# Patient Record
Sex: FEMALE | Race: WHITE | NOT HISPANIC OR LATINO | Employment: UNEMPLOYED | URBAN - METROPOLITAN AREA
[De-identification: names, ages, dates, MRNs, and addresses within clinical notes are randomized per-mention and may not be internally consistent; named-entity substitution may affect disease eponyms.]

---

## 2018-03-28 ENCOUNTER — OFFICE VISIT (OUTPATIENT)
Dept: FAMILY MEDICINE CLINIC | Facility: CLINIC | Age: 28
End: 2018-03-28
Payer: COMMERCIAL

## 2018-03-28 VITALS
WEIGHT: 234 LBS | DIASTOLIC BLOOD PRESSURE: 80 MMHG | OXYGEN SATURATION: 97 % | RESPIRATION RATE: 18 BRPM | HEART RATE: 102 BPM | SYSTOLIC BLOOD PRESSURE: 120 MMHG | BODY MASS INDEX: 38.99 KG/M2 | HEIGHT: 65 IN

## 2018-03-28 DIAGNOSIS — E03.9 ACQUIRED HYPOTHYROIDISM: ICD-10-CM

## 2018-03-28 DIAGNOSIS — Z76.89 ENCOUNTER TO ESTABLISH CARE: Primary | ICD-10-CM

## 2018-03-28 DIAGNOSIS — R55 SYNCOPE, UNSPECIFIED SYNCOPE TYPE: ICD-10-CM

## 2018-03-28 DIAGNOSIS — Z00.00 ANNUAL PHYSICAL EXAM: ICD-10-CM

## 2018-03-28 DIAGNOSIS — R00.2 HEART PALPITATIONS: ICD-10-CM

## 2018-03-28 DIAGNOSIS — E66.9 CLASS 2 OBESITY WITH BODY MASS INDEX (BMI) OF 38.0 TO 38.9 IN ADULT, UNSPECIFIED OBESITY TYPE, UNSPECIFIED WHETHER SERIOUS COMORBIDITY PRESENT: ICD-10-CM

## 2018-03-28 PROBLEM — F32.A DEPRESSION: Status: ACTIVE | Noted: 2018-03-28

## 2018-03-28 PROCEDURE — 99385 PREV VISIT NEW AGE 18-39: CPT | Performed by: FAMILY MEDICINE

## 2018-03-28 PROCEDURE — 93000 ELECTROCARDIOGRAM COMPLETE: CPT | Performed by: FAMILY MEDICINE

## 2018-03-28 PROCEDURE — 99203 OFFICE O/P NEW LOW 30 MIN: CPT | Performed by: FAMILY MEDICINE

## 2018-03-28 RX ORDER — LEVOTHYROXINE SODIUM 0.1 MG/1
TABLET ORAL
COMMUNITY
Start: 2018-03-11 | End: 2018-04-27 | Stop reason: SDUPTHER

## 2018-03-28 RX ORDER — NAPROXEN SODIUM 550 MG/1
550 TABLET ORAL
COMMUNITY
Start: 2014-05-14 | End: 2018-08-01 | Stop reason: ALTCHOICE

## 2018-03-28 NOTE — PROGRESS NOTES
Assessment/Plan:    No problem-specific Assessment & Plan notes found for this encounter  Diagnoses and all orders for this visit:    Encounter to establish care  Annual physical exam  - discussed diet and exercise  - no Flu vaccine this season  - unsure of last Tdap and whether or not she got the Gardasil series  - not sexually active   - advised to get immunization/medical records from former PCP/pediatrician - pt aware   - screening labs: CBC and differential  - RTO in 1month for f/u     Acquired hypothyroidism  -     TSH, 3rd generation with T4 reflex  - cont Levothyroxine 100mcg PO QD for now and will check TSH to discern if uptitration is needed     Class 2 obesity with body mass index (BMI) of 38 0 to 38 9 in adult, unspecified obesity type, unspecified whether serious comorbidity present  -     Lipid panel  -     Comprehensive metabolic panel    Heart palpitations  -     Vitamin D 25 hydroxy  -     POCT ECG - NSR at 70bpm, nml intervals     Syncope, unspecified syncope type  -     Ambulatory referral to Cardiology        Subjective:    Patient ID: Rachele Kate is a 34 y o  female  Rachele Kate is a 34 y o  female who presents to the office to establish care and for an annual exam  1) General   - prior PCP: Dr Jace Ray (sp?); last OV was 9/2017; insurance changed so establishing care here  - PMHx: Hypothyroidism, PCOS, IBS (with D/C), Depression (in remission), Panic Attacks (in remission), Eczema    - allergies: almonds (swelling)   - Meds: Levothyroxine 100mcg Qd, OTC Naproxen PRN for menses  - PSHx: [place] teeth extraction   - Fhx: M (adopted - hypothyroidism, allergies), F (DM), S (hip dysplasia, seizures), 1/2 B (depression, substance abuse); denies Fhx of sudden cardiac death   - Immunizations: last Tdap was ? 10yrs ago, no Flu vaccine this season, no Gardasil series   - GYN Hx: has never had a PAP smear; has never been sexually active   - diet/exercise: has not exercised; tries to limit gluten and diary   - social: rare EtOH, denies tob/illicits; currently unemployed   - sexual Hx: not active   - last vision: wears glasses, last OV >5yrs ago; no change in vision   - last dental: does not recall   - ROS: low energy, muscle weakness; palpitations/flutters - did pass out 2 week ago   - ROS: today in the office pt denies F/C/N/V/HA/visual changes/SOB/wheezing/abd pain/urinary incontinence/suprapubic pain/CVA tenderness/vaginal bleeding or discharge/numbness or tingling in b/l UE+LE/LE edema or calf tenderness  2) Palpitations  - palpitations/fluttering on/off for the past few months - attributes it to her hypothyroidism   - of note, does mention that she passed out 2wks ago when helping her sister back into bed (s/p hip surgery) in the middle of the night   - (+) tunnel vision, felt lightheaded and passed out   - not a/w F/C/N/V/HA/SOB  3) Hypothyroidism   - currently on Levothyroxine 100mcg QD   - unsure of when she last had labs/TSH drawn       The following portions of the patient's history were reviewed and updated as appropriate: allergies, current medications, past family history, past medical history, past social history, past surgical history and problem list     Review of Systems  per HPI   Objective:  /80   Pulse 102   Resp 18   Ht 5' 5" (1 651 m)   Wt 106 kg (234 lb)   SpO2 97%   BMI 38 94 kg/m²    Physical Exam   Constitutional: She is oriented to person, place, and time  She appears well-developed and well-nourished  No distress  HENT:   Head: Normocephalic and atraumatic  Right Ear: External ear normal    Left Ear: External ear normal    Nose: Nose normal    Mouth/Throat: Oropharynx is clear and moist  No oropharyngeal exudate  Eyes: Conjunctivae and EOM are normal  Pupils are equal, round, and reactive to light  Right eye exhibits no discharge  Left eye exhibits no discharge  No scleral icterus  Wears glasses   Neck: Normal range of motion  Neck supple   No JVD present  No tracheal deviation present  No thyromegaly present  Cardiovascular: Normal rate, regular rhythm and normal heart sounds  Exam reveals no gallop and no friction rub  No murmur heard  Pulmonary/Chest: Effort normal and breath sounds normal  No stridor  No respiratory distress  She has no wheezes  She has no rales  She exhibits no tenderness  Abdominal: Soft  Bowel sounds are normal  She exhibits no distension and no mass  There is no tenderness  There is no rebound and no guarding  Obese abdomen    Musculoskeletal: Normal range of motion  She exhibits no edema, tenderness or deformity  Lymphadenopathy:     She has no cervical adenopathy  Neurological: She is alert and oriented to person, place, and time  Skin: Skin is warm and dry  No rash noted  She is not diaphoretic  No erythema  No pallor  hirsuitism   Psychiatric: She has a normal mood and affect  Her behavior is normal  Judgment and thought content normal    Vitals reviewed

## 2018-04-13 ENCOUNTER — APPOINTMENT (OUTPATIENT)
Dept: LAB | Facility: CLINIC | Age: 28
End: 2018-04-13
Payer: COMMERCIAL

## 2018-04-13 LAB
25(OH)D3 SERPL-MCNC: 17.2 NG/ML (ref 30–100)
ALBUMIN SERPL BCP-MCNC: 4 G/DL (ref 3.5–5)
ALP SERPL-CCNC: 79 U/L (ref 46–116)
ALT SERPL W P-5'-P-CCNC: 23 U/L (ref 12–78)
ANION GAP SERPL CALCULATED.3IONS-SCNC: 4 MMOL/L (ref 4–13)
AST SERPL W P-5'-P-CCNC: 15 U/L (ref 5–45)
BASOPHILS # BLD AUTO: 0.08 THOUSANDS/ΜL (ref 0–0.1)
BASOPHILS NFR BLD AUTO: 2 % (ref 0–1)
BILIRUB SERPL-MCNC: 0.56 MG/DL (ref 0.2–1)
BUN SERPL-MCNC: 13 MG/DL (ref 5–25)
CALCIUM SERPL-MCNC: 8.8 MG/DL
CHLORIDE SERPL-SCNC: 106 MMOL/L (ref 100–108)
CHOLEST SERPL-MCNC: 183 MG/DL (ref 50–200)
CO2 SERPL-SCNC: 28 MMOL/L (ref 21–32)
CREAT SERPL-MCNC: 0.69 MG/DL (ref 0.6–1.3)
EOSINOPHIL # BLD AUTO: 0.11 THOUSAND/ΜL (ref 0–0.61)
EOSINOPHIL NFR BLD AUTO: 2 % (ref 0–6)
ERYTHROCYTE [DISTWIDTH] IN BLOOD BY AUTOMATED COUNT: 13 % (ref 11.6–15.1)
GFR SERPL CREATININE-BSD FRML MDRD: 121 ML/MIN/1.73SQ M
GLUCOSE P FAST SERPL-MCNC: 82 MG/DL (ref 65–99)
HCT VFR BLD AUTO: 40.8 % (ref 34.8–46.1)
HDLC SERPL-MCNC: 60 MG/DL (ref 40–60)
HGB BLD-MCNC: 13.1 G/DL (ref 11.5–15.4)
LDLC SERPL CALC-MCNC: 99 MG/DL (ref 0–100)
LYMPHOCYTES # BLD AUTO: 1.9 THOUSANDS/ΜL (ref 0.6–4.47)
LYMPHOCYTES NFR BLD AUTO: 40 % (ref 14–44)
MCH RBC QN AUTO: 30.9 PG (ref 26.8–34.3)
MCHC RBC AUTO-ENTMCNC: 32.1 G/DL (ref 31.4–37.4)
MCV RBC AUTO: 96 FL (ref 82–98)
MONOCYTES # BLD AUTO: 0.35 THOUSAND/ΜL (ref 0.17–1.22)
MONOCYTES NFR BLD AUTO: 7 % (ref 4–12)
NEUTROPHILS # BLD AUTO: 2.35 THOUSANDS/ΜL (ref 1.85–7.62)
NEUTS SEG NFR BLD AUTO: 49 % (ref 43–75)
NONHDLC SERPL-MCNC: 123 MG/DL
NRBC BLD AUTO-RTO: 0 /100 WBCS
PLATELET # BLD AUTO: 271 THOUSANDS/UL (ref 149–390)
PMV BLD AUTO: 12.2 FL (ref 8.9–12.7)
POTASSIUM SERPL-SCNC: 3.8 MMOL/L (ref 3.5–5.3)
PROT SERPL-MCNC: 7.9 G/DL (ref 6.4–8.2)
RBC # BLD AUTO: 4.24 MILLION/UL (ref 3.81–5.12)
SODIUM SERPL-SCNC: 138 MMOL/L (ref 136–145)
T4 FREE SERPL-MCNC: 0.96 NG/DL (ref 0.76–1.46)
TRIGL SERPL-MCNC: 119 MG/DL
TSH SERPL DL<=0.05 MIU/L-ACNC: 4.1 UIU/ML (ref 0.36–3.74)
WBC # BLD AUTO: 4.8 THOUSAND/UL (ref 4.31–10.16)

## 2018-04-13 PROCEDURE — 84439 ASSAY OF FREE THYROXINE: CPT | Performed by: FAMILY MEDICINE

## 2018-04-13 PROCEDURE — 80053 COMPREHEN METABOLIC PANEL: CPT | Performed by: FAMILY MEDICINE

## 2018-04-13 PROCEDURE — 36415 COLL VENOUS BLD VENIPUNCTURE: CPT | Performed by: FAMILY MEDICINE

## 2018-04-13 PROCEDURE — 85025 COMPLETE CBC W/AUTO DIFF WBC: CPT | Performed by: FAMILY MEDICINE

## 2018-04-13 PROCEDURE — 84443 ASSAY THYROID STIM HORMONE: CPT | Performed by: FAMILY MEDICINE

## 2018-04-13 PROCEDURE — 80061 LIPID PANEL: CPT | Performed by: FAMILY MEDICINE

## 2018-04-13 PROCEDURE — 82306 VITAMIN D 25 HYDROXY: CPT | Performed by: FAMILY MEDICINE

## 2018-04-27 ENCOUNTER — OFFICE VISIT (OUTPATIENT)
Dept: FAMILY MEDICINE CLINIC | Facility: CLINIC | Age: 28
End: 2018-04-27
Payer: COMMERCIAL

## 2018-04-27 VITALS
DIASTOLIC BLOOD PRESSURE: 88 MMHG | HEART RATE: 72 BPM | WEIGHT: 238 LBS | HEIGHT: 65 IN | RESPIRATION RATE: 16 BRPM | SYSTOLIC BLOOD PRESSURE: 132 MMHG | OXYGEN SATURATION: 99 % | BODY MASS INDEX: 39.65 KG/M2

## 2018-04-27 DIAGNOSIS — E55.9 VITAMIN D DEFICIENCY: ICD-10-CM

## 2018-04-27 DIAGNOSIS — E03.9 ACQUIRED HYPOTHYROIDISM: ICD-10-CM

## 2018-04-27 DIAGNOSIS — F41.1 ANXIETY STATE: Primary | ICD-10-CM

## 2018-04-27 PROCEDURE — 99213 OFFICE O/P EST LOW 20 MIN: CPT | Performed by: FAMILY MEDICINE

## 2018-04-27 RX ORDER — LEVOTHYROXINE SODIUM 112 UG/1
112 TABLET ORAL DAILY
Qty: 30 TABLET | Refills: 1 | Status: SHIPPED | OUTPATIENT
Start: 2018-04-27 | End: 2018-06-11 | Stop reason: SDUPTHER

## 2018-04-27 NOTE — PROGRESS NOTES
Assessment/Plan:    No problem-specific Assessment & Plan notes found for this encounter  Diagnoses and all orders for this visit:  Anxiety state  - discussed starting on SSRI = pt to think about it - very hesitant to start given h/o significant weight gain on antidepressants in the past  - denies SI/HI  - has a lot of anxiety   - referred to in-office interim therapist to help figure out root of anxiety and to help with coping skills/stress management   - t/c referral back to Dr Supa Reyes - pt's former [address] [place] [address],[address] in 4wks for further eval     Acquired hypothyroidism  -  Increased levothyroxine from 100 to 112mcg QD  -  levothyroxine 112 mcg tablet; Take 1 tablet (112 mcg total) by mouth daily  -  TSH, 3rd generation with T4 reflex in 4wks   - RTO in 4wks to discern if medication titration is warranted - pt agreeable     Vitamin D deficiency  -     cholecalciferol 2000 units TABS; Take 1 tablets (1,000 Units total) by mouth daily        Subjective:    Patient ID: Jes Bhagat is a 34 y o  female    33yo F presents to the office for f/u labs, Hypothyroidism and Palpitations  1) Labs  - CBC and CMP within normal limits  - Lipid Panel: , , HDL 60, LDL 99  - Vit D 17 2  2) Hypothyroidism   - currently on Levothyroxine 100mcg QD  - TSH 4 1, T4 0 96  - intermittent palpitations  - office EKG at last OV was within normal limits   - has not gone to see Cardio yet   - denies F/C/N/V/CP/SOB/abd pain  3) h/o Anxiety and Depression   - Brother and Grandparent  within the past 2yrs   - used to follow with Dr Supa Reyes and has tried the following in the past: Lexapro, Ativan, Xanax, Vybrid   - self weaned off bc was gaining weight  - has never seen a therapist   - (+) very anxious   - denies SI/HI      The following portions of the patient's history were reviewed and updated as appropriate: allergies, current medications, past family history, past medical history, past social history, past surgical history and problem list     Review of Systems  as per HPI   Objective:  /88   Pulse 72   Resp 16   Ht 5' 5" (1 651 m)   Wt 108 kg (238 lb)   SpO2 99%   BMI 39 61 kg/m²    Physical Exam   Constitutional: She is oriented to person, place, and time  She appears well-developed and well-nourished  No distress  HENT:   Head: Normocephalic and atraumatic  Nose: Nose normal    Eyes: Conjunctivae and EOM are normal  Right eye exhibits no discharge  Left eye exhibits no discharge  No scleral icterus  Wears glasses   Neck: Normal range of motion  No tracheal deviation present  Cardiovascular: Normal rate, regular rhythm and normal heart sounds  Exam reveals no gallop and no friction rub  No murmur heard  Pulmonary/Chest: Effort normal and breath sounds normal  No stridor  Abdominal: Soft  Bowel sounds are normal    Musculoskeletal: Normal range of motion  She exhibits no edema or deformity  Neurological: She is alert and oriented to person, place, and time  Skin: Skin is warm and dry  No rash noted  She is not diaphoretic  No erythema  No pallor  Psychiatric: Her speech is normal and behavior is normal  Judgment and thought content normal  Her mood appears anxious  Cognition and memory are normal  She expresses no homicidal and no suicidal ideation  She expresses no suicidal plans and no homicidal plans  Vitals reviewed

## 2018-06-05 ENCOUNTER — APPOINTMENT (OUTPATIENT)
Dept: LAB | Facility: CLINIC | Age: 28
End: 2018-06-05
Payer: COMMERCIAL

## 2018-06-05 DIAGNOSIS — E03.9 ACQUIRED HYPOTHYROIDISM: ICD-10-CM

## 2018-06-05 LAB — TSH SERPL DL<=0.05 MIU/L-ACNC: 0.84 UIU/ML (ref 0.36–3.74)

## 2018-06-05 PROCEDURE — 84443 ASSAY THYROID STIM HORMONE: CPT

## 2018-06-05 PROCEDURE — 36415 COLL VENOUS BLD VENIPUNCTURE: CPT

## 2018-06-11 DIAGNOSIS — E03.9 ACQUIRED HYPOTHYROIDISM: ICD-10-CM

## 2018-06-12 RX ORDER — LEVOTHYROXINE SODIUM 112 UG/1
112 TABLET ORAL DAILY
Qty: 30 TABLET | Refills: 0 | Status: SHIPPED | OUTPATIENT
Start: 2018-06-12 | End: 2018-07-24 | Stop reason: SDUPTHER

## 2018-06-13 ENCOUNTER — OFFICE VISIT (OUTPATIENT)
Dept: FAMILY MEDICINE CLINIC | Facility: CLINIC | Age: 28
End: 2018-06-13
Payer: COMMERCIAL

## 2018-06-13 VITALS
HEART RATE: 71 BPM | SYSTOLIC BLOOD PRESSURE: 108 MMHG | WEIGHT: 237 LBS | OXYGEN SATURATION: 99 % | RESPIRATION RATE: 16 BRPM | BODY MASS INDEX: 39.49 KG/M2 | HEIGHT: 65 IN | DIASTOLIC BLOOD PRESSURE: 68 MMHG

## 2018-06-13 DIAGNOSIS — D22.9 ATYPICAL MOLE: ICD-10-CM

## 2018-06-13 DIAGNOSIS — F41.9 ANXIETY: ICD-10-CM

## 2018-06-13 DIAGNOSIS — E03.9 ACQUIRED HYPOTHYROIDISM: ICD-10-CM

## 2018-06-13 DIAGNOSIS — E55.9 VITAMIN D DEFICIENCY: Primary | ICD-10-CM

## 2018-06-13 PROCEDURE — 99214 OFFICE O/P EST MOD 30 MIN: CPT | Performed by: FAMILY MEDICINE

## 2018-06-13 RX ORDER — CHOLECALCIFEROL (VITAMIN D3) 1250 MCG
1 CAPSULE ORAL WEEKLY
Qty: 12 CAPSULE | Refills: 0 | Status: SHIPPED | OUTPATIENT
Start: 2018-06-13 | End: 2018-08-24 | Stop reason: SDUPTHER
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
## 2018-06-19 DIAGNOSIS — F41.9 ANXIETY: Primary | ICD-10-CM

## 2018-07-02 ENCOUNTER — OFFICE VISIT (OUTPATIENT)
Dept: BEHAVIORAL/MENTAL HEALTH CLINIC | Facility: CLINIC | Age: 28
End: 2018-07-02
Payer: COMMERCIAL

## 2018-07-02 DIAGNOSIS — F41.9 ANXIETY: ICD-10-CM

## 2018-07-02 PROCEDURE — 90834 PSYTX W PT 45 MINUTES: CPT | Performed by: SOCIAL WORKER

## 2018-07-02 NOTE — PATIENT INSTRUCTIONS
Follow up in 2 weeks with this worker for support therapy  Pt will be contacting her insurance and start calling local agencies for long-term counseling

## 2018-07-02 NOTE — PSYCH
Assessment/Plan:      Diagnoses and all orders for this visit:    Anxiety  -     Ambulatory referral to Behavioral Health          Subjective: manage anxeity, depression, and grief symptoms     Patient ID: Felisha Morin is a 34 y o  female  Pt has been struggling with increased anxiety/depression and grief  Discussed pt's losses (brother and grandfather) in the past few years and the difficulty she has had with the grief process  Discussed family dynamics and pt's tendency to be a caregiver for other instead of caring for herself  Pt will continue to follow up until she is linked with ongoing OP services  Pt has support within her family  Pt was alert, oriented, pleasant, and denies SI or HI  Review of Systems   Psychiatric/Behavioral: Positive for sleep disturbance  The patient is nervous/anxious  Objective:     Physical Exam   Psychiatric: Her speech is normal and behavior is normal  Judgment and thought content normal  Her mood appears anxious  Cognition and memory are normal  She exhibits a depressed mood

## 2018-07-16 ENCOUNTER — OFFICE VISIT (OUTPATIENT)
Dept: BEHAVIORAL/MENTAL HEALTH CLINIC | Facility: CLINIC | Age: 28
End: 2018-07-16
Payer: COMMERCIAL

## 2018-07-16 DIAGNOSIS — F41.9 ANXIETY: Primary | ICD-10-CM

## 2018-07-16 PROCEDURE — 90834 PSYTX W PT 45 MINUTES: CPT | Performed by: SOCIAL WORKER

## 2018-07-16 NOTE — PSYCH
Assessment/Plan:      Diagnoses and all orders for this visit:    Anxiety          Subjective: Pt presents for a follow up session regarding her grief and anxiety  Patient ID: Tiffany Caban is a 34 y o  female  Discussed recent struggles dealing with her grandfather's house and coping with the one year anniversary of her brother's passing  Continued to discuss the importance of routines and structure, as well as the importance of self care  Discussed pt looking into some volunteer opportunities instead of rushing back into work  Pt will follow up in 2 weeks for continued support  Review of Systems   Psychiatric/Behavioral: Positive for dysphoric mood  The patient is nervous/anxious  Objective: Pt will continue to work on her self care as she tends to be more of a caregiver  Pt will look into ongoing OP therapy options and volunteer opportunities between now and the next session  Physical Exam   Psychiatric: She has a normal mood and affect   Her speech is normal and behavior is normal  Judgment and thought content normal  Cognition and memory are normal

## 2018-07-24 DIAGNOSIS — E03.9 ACQUIRED HYPOTHYROIDISM: ICD-10-CM

## 2018-07-25 RX ORDER — LEVOTHYROXINE SODIUM 112 UG/1
TABLET ORAL
Qty: 30 TABLET | Refills: 0 | Status: SHIPPED | OUTPATIENT
Start: 2018-07-25 | End: 2018-08-01 | Stop reason: SDUPTHER

## 2018-07-27 ENCOUNTER — APPOINTMENT (OUTPATIENT)
Dept: LAB | Facility: CLINIC | Age: 28
End: 2018-07-27
Payer: COMMERCIAL

## 2018-07-27 DIAGNOSIS — E03.9 ACQUIRED HYPOTHYROIDISM: ICD-10-CM

## 2018-07-27 LAB — TSH SERPL DL<=0.05 MIU/L-ACNC: 0.76 UIU/ML (ref 0.36–3.74)

## 2018-07-27 PROCEDURE — 36415 COLL VENOUS BLD VENIPUNCTURE: CPT

## 2018-07-27 PROCEDURE — 84443 ASSAY THYROID STIM HORMONE: CPT

## 2018-08-01 ENCOUNTER — OFFICE VISIT (OUTPATIENT)
Dept: FAMILY MEDICINE CLINIC | Facility: CLINIC | Age: 28
End: 2018-08-01
Payer: COMMERCIAL

## 2018-08-01 VITALS
HEIGHT: 65 IN | HEART RATE: 82 BPM | WEIGHT: 233 LBS | DIASTOLIC BLOOD PRESSURE: 70 MMHG | SYSTOLIC BLOOD PRESSURE: 128 MMHG | RESPIRATION RATE: 16 BRPM | BODY MASS INDEX: 38.82 KG/M2 | OXYGEN SATURATION: 98 %

## 2018-08-01 DIAGNOSIS — E66.9 CLASS 2 OBESITY WITH BODY MASS INDEX (BMI) OF 38.0 TO 38.9 IN ADULT, UNSPECIFIED OBESITY TYPE, UNSPECIFIED WHETHER SERIOUS COMORBIDITY PRESENT: ICD-10-CM

## 2018-08-01 DIAGNOSIS — K58.2 IRRITABLE BOWEL SYNDROME WITH BOTH CONSTIPATION AND DIARRHEA: ICD-10-CM

## 2018-08-01 DIAGNOSIS — E03.9 ACQUIRED HYPOTHYROIDISM: Primary | ICD-10-CM

## 2018-08-01 DIAGNOSIS — F41.9 ANXIETY: ICD-10-CM

## 2018-08-01 DIAGNOSIS — E55.9 VITAMIN D DEFICIENCY: ICD-10-CM

## 2018-08-01 DIAGNOSIS — D22.9 ATYPICAL MOLE: ICD-10-CM

## 2018-08-01 PROCEDURE — 99213 OFFICE O/P EST LOW 20 MIN: CPT | Performed by: FAMILY MEDICINE

## 2018-08-01 PROCEDURE — 3008F BODY MASS INDEX DOCD: CPT | Performed by: FAMILY MEDICINE

## 2018-08-01 RX ORDER — LEVOTHYROXINE SODIUM 112 UG/1
112 TABLET ORAL DAILY
Qty: 90 TABLET | Refills: 0 | Status: SHIPPED | OUTPATIENT
Start: 2018-08-01 | End: 2018-11-14 | Stop reason: DRUGHIGH

## 2018-08-01 NOTE — PROGRESS NOTES
Assessment/Plan:  No problem-specific Assessment & Plan notes found for this encounter  Diagnoses and all orders for this visit:  Acquired hypothyroidism  - TSH within normal limits  - cont Levothyroxine 112mcg QAM  -     TSH, 3rd generation with T4 reflex; Future  -     levothyroxine 112 mcg tablet; Take 1 tablet (112 mcg total) by mouth daily  - RTO in 3months for recheck     Irritable bowel syndrome with both constipation and diarrhea  -     Ambulatory referral to Gastroenterology; Future  - discussed lifestyle modifications and stress reduction     Anxiety  - has not made a f/u appt with her psychiatrist - Dr [place] - strongly encouraged to do so   - advised to also follow with a therapist - was seeing Vanessa Ascencio in the office, but she is no longer with [facility] had given her a list of local therapists to follow with     Vitamin D deficiency  - was given 5000IU QD by the pharmacy when prescribed [id number] Qwk    Atypical mole  - had it removed by her parents PCP = per pt path was nml - advised to get records     Class 2 obesity with body mass index (BMI) of 38 0 to 38 9 in adult, unspecified obesity type, unspecified whether serious comorbidity present  - has lost 4lbs since last OV ~6wks ago  - encouraged to cont exercising - has been walking more          Subjective:    Patient ID: Alvaro Kumar is a 34 y o  female    35yo F presents to the office for f/u   1) Atypical Nevi  - had it removed by her parent's PCP = path came back nml per pt (awaiting records)   2) TSH  - takes 112mcg QAM and tolerating it well   - TSH within nml limits at 0 763   - today in the office pt denies F/C/V/CP/palpitations/SOB/wheezing/abd pain/LE edema   - (+) intermittent nausea  3) Vit D  - 5000IU was filled at Pharmacy vs [id number] Qwk  - tends to get sweaty while taking it - takes it around lunch time with food    4) Anxiety   - in the process of selling grandfather's house - has been stressful this past month  - followed with Anyi Rodrigues in the office (and found it useful) - was referred to other therapists as Anyi Ravi no longer here  - has not made an appt with Dr Loman Bernheim office   - has lost 4lbs since her last OV 6wks ago - has been trying to exercise  - (+) PMHx of IBS and feels like its been acting up given increased stress at home over the past few weeks        The following portions of the patient's history were reviewed and updated as appropriate: allergies, current medications, past family history, past medical history, past social history, past surgical history and problem list     Review of Systems  as per HPI     Objective:  /70   Pulse 82   Resp 16   Ht 5' 5" (1 651 m)   Wt 106 kg (233 lb)   SpO2 98%   BMI 38 77 kg/m²    Physical Exam   Constitutional: She is oriented to person, place, and time  She appears well-developed and well-nourished  No distress  HENT:   Head: Normocephalic and atraumatic  Nose: Nose normal    Eyes: Conjunctivae and EOM are normal  Right eye exhibits no discharge  Left eye exhibits no discharge  No scleral icterus  Wears glasses   Neck: Normal range of motion  Neck supple  No JVD present  No thyromegaly present  Cardiovascular: Normal rate, regular rhythm and normal heart sounds  Exam reveals no gallop and no friction rub  No murmur heard  Pulmonary/Chest: Effort normal and breath sounds normal  No stridor  No respiratory distress  She has no wheezes  She has no rales  Abdominal: Soft  Bowel sounds are normal    Musculoskeletal: Normal range of motion  She exhibits no edema, tenderness or deformity  Neurological: She is alert and oriented to person, place, and time  Skin: Skin is warm and dry  She is not diaphoretic  Well healing site of atypical nevi removal    Psychiatric: She has a normal mood and affect  Her behavior is normal  Judgment and thought content normal    Vitals reviewed

## 2018-08-24 DIAGNOSIS — E55.9 VITAMIN D DEFICIENCY: ICD-10-CM

## 2018-08-24 RX ORDER — CHOLECALCIFEROL (VITAMIN D3) 1250 MCG
1 CAPSULE ORAL WEEKLY
Qty: 12 CAPSULE | Refills: 0 | Status: SHIPPED | OUTPATIENT
Start: 2018-08-24 | End: 2019-04-24 | Stop reason: SDUPTHER

## 2018-11-09 ENCOUNTER — LAB (OUTPATIENT)
Dept: LAB | Facility: CLINIC | Age: 28
End: 2018-11-09
Payer: COMMERCIAL

## 2018-11-09 DIAGNOSIS — E03.9 ACQUIRED HYPOTHYROIDISM: ICD-10-CM

## 2018-11-09 LAB
T4 FREE SERPL-MCNC: 1.03 NG/DL (ref 0.76–1.46)
TSH SERPL DL<=0.05 MIU/L-ACNC: 0.32 UIU/ML (ref 0.36–3.74)

## 2018-11-09 PROCEDURE — 36415 COLL VENOUS BLD VENIPUNCTURE: CPT

## 2018-11-09 PROCEDURE — 84443 ASSAY THYROID STIM HORMONE: CPT

## 2018-11-09 PROCEDURE — 84439 ASSAY OF FREE THYROXINE: CPT

## 2018-11-14 ENCOUNTER — OFFICE VISIT (OUTPATIENT)
Dept: FAMILY MEDICINE CLINIC | Facility: CLINIC | Age: 28
End: 2018-11-14
Payer: COMMERCIAL

## 2018-11-14 VITALS
OXYGEN SATURATION: 97 % | SYSTOLIC BLOOD PRESSURE: 118 MMHG | DIASTOLIC BLOOD PRESSURE: 76 MMHG | RESPIRATION RATE: 16 BRPM | HEIGHT: 65 IN | HEART RATE: 100 BPM | WEIGHT: 244.6 LBS | BODY MASS INDEX: 40.75 KG/M2

## 2018-11-14 DIAGNOSIS — Z23 NEED FOR TDAP VACCINATION: ICD-10-CM

## 2018-11-14 DIAGNOSIS — E03.9 ACQUIRED HYPOTHYROIDISM: Primary | ICD-10-CM

## 2018-11-14 DIAGNOSIS — F41.9 ANXIETY: ICD-10-CM

## 2018-11-14 DIAGNOSIS — E55.9 VITAMIN D DEFICIENCY: ICD-10-CM

## 2018-11-14 DIAGNOSIS — Z23 NEED FOR INFLUENZA VACCINATION: ICD-10-CM

## 2018-11-14 PROCEDURE — 90715 TDAP VACCINE 7 YRS/> IM: CPT

## 2018-11-14 PROCEDURE — 90471 IMMUNIZATION ADMIN: CPT

## 2018-11-14 PROCEDURE — 3008F BODY MASS INDEX DOCD: CPT | Performed by: FAMILY MEDICINE

## 2018-11-14 PROCEDURE — 1036F TOBACCO NON-USER: CPT | Performed by: FAMILY MEDICINE

## 2018-11-14 PROCEDURE — 90686 IIV4 VACC NO PRSV 0.5 ML IM: CPT

## 2018-11-14 PROCEDURE — 99214 OFFICE O/P EST MOD 30 MIN: CPT | Performed by: FAMILY MEDICINE

## 2018-11-14 PROCEDURE — 90472 IMMUNIZATION ADMIN EACH ADD: CPT

## 2018-11-14 RX ORDER — LEVOTHYROXINE SODIUM 0.1 MG/1
100 TABLET ORAL DAILY
Qty: 90 TABLET | Refills: 0 | Status: SHIPPED | OUTPATIENT
Start: 2018-11-14 | End: 2019-02-13 | Stop reason: SDUPTHER

## 2019-02-12 LAB
25(OH)D3 SERPL-MCNC: 33 NG/ML (ref 30–100)
TSH SERPL-ACNC: 2.58 MIU/L

## 2019-02-13 ENCOUNTER — TELEPHONE (OUTPATIENT)
Dept: FAMILY MEDICINE CLINIC | Facility: CLINIC | Age: 29
End: 2019-02-13

## 2019-02-13 ENCOUNTER — OFFICE VISIT (OUTPATIENT)
Dept: FAMILY MEDICINE CLINIC | Facility: CLINIC | Age: 29
End: 2019-02-13
Payer: COMMERCIAL

## 2019-02-13 VITALS
SYSTOLIC BLOOD PRESSURE: 122 MMHG | DIASTOLIC BLOOD PRESSURE: 62 MMHG | BODY MASS INDEX: 39.82 KG/M2 | HEIGHT: 65 IN | OXYGEN SATURATION: 98 % | HEART RATE: 94 BPM | WEIGHT: 239 LBS | RESPIRATION RATE: 16 BRPM

## 2019-02-13 DIAGNOSIS — F41.9 ANXIETY: ICD-10-CM

## 2019-02-13 DIAGNOSIS — E03.9 ACQUIRED HYPOTHYROIDISM: ICD-10-CM

## 2019-02-13 DIAGNOSIS — K58.2 IRRITABLE BOWEL SYNDROME WITH BOTH CONSTIPATION AND DIARRHEA: ICD-10-CM

## 2019-02-13 DIAGNOSIS — E55.9 VITAMIN D DEFICIENCY: ICD-10-CM

## 2019-02-13 DIAGNOSIS — E03.9 ACQUIRED HYPOTHYROIDISM: Primary | ICD-10-CM

## 2019-02-13 DIAGNOSIS — E66.9 CLASS 2 OBESITY WITH BODY MASS INDEX (BMI) OF 39.0 TO 39.9 IN ADULT, UNSPECIFIED OBESITY TYPE, UNSPECIFIED WHETHER SERIOUS COMORBIDITY PRESENT: ICD-10-CM

## 2019-02-13 PROCEDURE — 3008F BODY MASS INDEX DOCD: CPT | Performed by: FAMILY MEDICINE

## 2019-02-13 PROCEDURE — 3725F SCREEN DEPRESSION PERFORMED: CPT | Performed by: FAMILY MEDICINE

## 2019-02-13 PROCEDURE — 99213 OFFICE O/P EST LOW 20 MIN: CPT | Performed by: FAMILY MEDICINE

## 2019-02-13 PROCEDURE — 1036F TOBACCO NON-USER: CPT | Performed by: FAMILY MEDICINE

## 2019-02-13 RX ORDER — LEVOTHYROXINE SODIUM 0.1 MG/1
100 TABLET ORAL DAILY
Qty: 90 TABLET | Refills: 0 | Status: SHIPPED | OUTPATIENT
Start: 2019-02-13 | End: 2019-04-24 | Stop reason: SDUPTHER

## 2019-02-13 NOTE — TELEPHONE ENCOUNTER
Patient called in and said she forgot to tell Dr Joann Brennan that she needs a refill on her thyroid medication  She uses [address] [place]
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
## 2019-03-01 ENCOUNTER — HOSPITAL ENCOUNTER (OUTPATIENT)
Dept: ULTRASOUND IMAGING | Facility: HOSPITAL | Age: 29
Discharge: HOME | End: 2019-03-01
Payer: COMMERCIAL

## 2019-03-01 DIAGNOSIS — E03.9 ACQUIRED HYPOTHYROIDISM: ICD-10-CM

## 2019-03-01 PROCEDURE — 76536 US EXAM OF HEAD AND NECK: CPT

## 2019-04-08 ENCOUNTER — OFFICE VISIT (OUTPATIENT)
Dept: OBGYN CLINIC | Facility: CLINIC | Age: 29
End: 2019-04-08
Payer: COMMERCIAL

## 2019-04-08 VITALS
WEIGHT: 237.4 LBS | BODY MASS INDEX: 39.55 KG/M2 | SYSTOLIC BLOOD PRESSURE: 122 MMHG | HEIGHT: 65 IN | DIASTOLIC BLOOD PRESSURE: 76 MMHG

## 2019-04-08 DIAGNOSIS — L68.0 HIRSUTISM: Primary | ICD-10-CM

## 2019-04-08 PROCEDURE — 99203 OFFICE O/P NEW LOW 30 MIN: CPT | Performed by: OBSTETRICS & GYNECOLOGY

## 2019-04-18 LAB
25(OH)D3 SERPL-MCNC: 24 NG/ML (ref 30–100)
ALBUMIN SERPL-MCNC: 4.4 G/DL (ref 3.6–5.1)
ALBUMIN/GLOB SERPL: 1.5 (CALC) (ref 1–2.5)
ALP SERPL-CCNC: 65 U/L (ref 33–115)
ALT SERPL-CCNC: 10 U/L (ref 6–29)
AST SERPL-CCNC: 14 U/L (ref 10–30)
BASOPHILS # BLD AUTO: 78 CELLS/UL (ref 0–200)
BASOPHILS NFR BLD AUTO: 1.7 %
BILIRUB SERPL-MCNC: 0.8 MG/DL (ref 0.2–1.2)
BUN SERPL-MCNC: 12 MG/DL (ref 7–25)
BUN/CREAT SERPL: NORMAL (CALC) (ref 6–22)
CALCIUM SERPL-MCNC: 9.8 MG/DL (ref 8.6–10.2)
CHLORIDE SERPL-SCNC: 103 MMOL/L (ref 98–110)
CHOLEST SERPL-MCNC: 195 MG/DL
CHOLEST/HDLC SERPL: 2.9 (CALC)
CO2 SERPL-SCNC: 28 MMOL/L (ref 20–32)
CREAT SERPL-MCNC: 0.77 MG/DL (ref 0.5–1.1)
EOSINOPHIL # BLD AUTO: 101 CELLS/UL (ref 15–500)
EOSINOPHIL NFR BLD AUTO: 2.2 %
ERYTHROCYTE [DISTWIDTH] IN BLOOD BY AUTOMATED COUNT: 12.6 % (ref 11–15)
EST. AVERAGE GLUCOSE BLD GHB EST-MCNC: 91 (CALC)
EST. AVERAGE GLUCOSE BLD GHB EST-SCNC: 5 (CALC)
GLOBULIN SER CALC-MCNC: 2.9 G/DL (CALC) (ref 1.9–3.7)
GLUCOSE SERPL-MCNC: 79 MG/DL (ref 65–99)
HBA1C MFR BLD: 4.8 % OF TOTAL HGB
HCT VFR BLD AUTO: 40.3 % (ref 35–45)
HDLC SERPL-MCNC: 68 MG/DL
HGB BLD-MCNC: 13.7 G/DL (ref 11.7–15.5)
LDLC SERPL CALC-MCNC: 111 MG/DL (CALC)
LYMPHOCYTES # BLD AUTO: 1403 CELLS/UL (ref 850–3900)
LYMPHOCYTES NFR BLD AUTO: 30.5 %
MCH RBC QN AUTO: 31.5 PG (ref 27–33)
MCHC RBC AUTO-ENTMCNC: 34 G/DL (ref 32–36)
MCV RBC AUTO: 92.6 FL (ref 80–100)
MONOCYTES # BLD AUTO: 359 CELLS/UL (ref 200–950)
MONOCYTES NFR BLD AUTO: 7.8 %
NEUTROPHILS # BLD AUTO: 2659 CELLS/UL (ref 1500–7800)
NEUTROPHILS NFR BLD AUTO: 57.8 %
NONHDLC SERPL-MCNC: 127 MG/DL (CALC)
PLATELET # BLD AUTO: 273 THOUSAND/UL (ref 140–400)
PMV BLD REES-ECKER: 12.6 FL (ref 7.5–12.5)
POTASSIUM SERPL-SCNC: 4.4 MMOL/L (ref 3.5–5.3)
PROT SERPL-MCNC: 7.3 G/DL (ref 6.1–8.1)
RBC # BLD AUTO: 4.35 MILLION/UL (ref 3.8–5.1)
SL AMB EGFR AFRICAN AMERICAN: 123 ML/MIN/1.73M2
SL AMB EGFR NON AFRICAN AMERICAN: 106 ML/MIN/1.73M2
SODIUM SERPL-SCNC: 139 MMOL/L (ref 135–146)
TRIGL SERPL-MCNC: 69 MG/DL
TSH SERPL-ACNC: 1.92 MIU/L
WBC # BLD AUTO: 4.6 THOUSAND/UL (ref 3.8–10.8)

## 2019-04-24 ENCOUNTER — OFFICE VISIT (OUTPATIENT)
Dept: FAMILY MEDICINE CLINIC | Facility: CLINIC | Age: 29
End: 2019-04-24
Payer: COMMERCIAL

## 2019-04-24 VITALS
BODY MASS INDEX: 40.29 KG/M2 | SYSTOLIC BLOOD PRESSURE: 120 MMHG | HEIGHT: 65 IN | DIASTOLIC BLOOD PRESSURE: 70 MMHG | HEART RATE: 74 BPM | OXYGEN SATURATION: 99 % | WEIGHT: 241.8 LBS | RESPIRATION RATE: 16 BRPM

## 2019-04-24 DIAGNOSIS — F41.9 ANXIETY: ICD-10-CM

## 2019-04-24 DIAGNOSIS — M54.2 NECK PAIN ON LEFT SIDE: ICD-10-CM

## 2019-04-24 DIAGNOSIS — E66.01 CLASS 3 SEVERE OBESITY WITH BODY MASS INDEX (BMI) OF 40.0 TO 44.9 IN ADULT, UNSPECIFIED OBESITY TYPE, UNSPECIFIED WHETHER SERIOUS COMORBIDITY PRESENT (HCC): ICD-10-CM

## 2019-04-24 DIAGNOSIS — Z00.00 ANNUAL PHYSICAL EXAM: Primary | ICD-10-CM

## 2019-04-24 DIAGNOSIS — E03.9 ACQUIRED HYPOTHYROIDISM: ICD-10-CM

## 2019-04-24 DIAGNOSIS — E55.9 VITAMIN D DEFICIENCY: ICD-10-CM

## 2019-04-24 DIAGNOSIS — K58.2 IRRITABLE BOWEL SYNDROME WITH BOTH CONSTIPATION AND DIARRHEA: ICD-10-CM

## 2019-04-24 PROCEDURE — 99395 PREV VISIT EST AGE 18-39: CPT | Performed by: FAMILY MEDICINE

## 2019-04-24 PROCEDURE — 99214 OFFICE O/P EST MOD 30 MIN: CPT | Performed by: FAMILY MEDICINE

## 2019-04-24 PROCEDURE — 3725F SCREEN DEPRESSION PERFORMED: CPT | Performed by: FAMILY MEDICINE

## 2019-04-24 RX ORDER — LEVOTHYROXINE SODIUM 0.1 MG/1
100 TABLET ORAL DAILY
Qty: 90 TABLET | Refills: 1 | Status: SHIPPED | OUTPATIENT
Start: 2019-04-24 | End: 2019-10-30 | Stop reason: SDUPTHER

## 2019-04-24 RX ORDER — COLLAGEN, HYDROLYSATE (BOVINE) 100 %
1 POWDER (GRAM) MISCELLANEOUS DAILY
COMMUNITY
End: 2020-05-08

## 2019-04-24 RX ORDER — CHOLECALCIFEROL (VITAMIN D3) 1250 MCG
1 CAPSULE ORAL WEEKLY
Qty: 25 CAPSULE | Refills: 0 | Status: SHIPPED | OUTPATIENT
Start: 2019-04-24 | End: 2020-01-14

## 2019-05-28 ENCOUNTER — OFFICE VISIT (OUTPATIENT)
Dept: BARIATRICS | Facility: CLINIC | Age: 29
End: 2019-05-28
Payer: COMMERCIAL

## 2019-05-28 VITALS
SYSTOLIC BLOOD PRESSURE: 130 MMHG | DIASTOLIC BLOOD PRESSURE: 80 MMHG | WEIGHT: 238.3 LBS | HEIGHT: 67 IN | HEART RATE: 66 BPM | BODY MASS INDEX: 37.4 KG/M2 | RESPIRATION RATE: 16 BRPM | TEMPERATURE: 98.4 F

## 2019-05-28 DIAGNOSIS — E03.9 ACQUIRED HYPOTHYROIDISM: ICD-10-CM

## 2019-05-28 DIAGNOSIS — E55.9 VITAMIN D DEFICIENCY: ICD-10-CM

## 2019-05-28 DIAGNOSIS — E66.9 CLASS 2 OBESITY WITH BODY MASS INDEX (BMI) OF 37.0 TO 37.9 IN ADULT, UNSPECIFIED OBESITY TYPE, UNSPECIFIED WHETHER SERIOUS COMORBIDITY PRESENT: Primary | ICD-10-CM

## 2019-05-28 DIAGNOSIS — F32.A DEPRESSION: ICD-10-CM

## 2019-05-28 DIAGNOSIS — F41.9 ANXIETY: ICD-10-CM

## 2019-05-28 PROCEDURE — 99243 OFF/OP CNSLTJ NEW/EST LOW 30: CPT | Performed by: FAMILY MEDICINE

## 2019-07-29 ENCOUNTER — OFFICE VISIT (OUTPATIENT)
Dept: BARIATRICS | Facility: CLINIC | Age: 29
End: 2019-07-29
Payer: COMMERCIAL

## 2019-07-29 VITALS
WEIGHT: 237.4 LBS | HEART RATE: 59 BPM | TEMPERATURE: 98.2 F | HEIGHT: 67 IN | SYSTOLIC BLOOD PRESSURE: 130 MMHG | DIASTOLIC BLOOD PRESSURE: 80 MMHG | RESPIRATION RATE: 16 BRPM | BODY MASS INDEX: 37.26 KG/M2

## 2019-07-29 DIAGNOSIS — E66.9 CLASS 2 OBESITY WITH BODY MASS INDEX (BMI) OF 37.0 TO 37.9 IN ADULT, UNSPECIFIED OBESITY TYPE, UNSPECIFIED WHETHER SERIOUS COMORBIDITY PRESENT: Primary | ICD-10-CM

## 2019-07-29 DIAGNOSIS — E03.9 ACQUIRED HYPOTHYROIDISM: ICD-10-CM

## 2019-07-29 PROCEDURE — 99214 OFFICE O/P EST MOD 30 MIN: CPT | Performed by: FAMILY MEDICINE

## 2019-07-29 NOTE — PROGRESS NOTES
Assessment/Plan:    No problem-specific Assessment & Plan notes found for this encounter  Diagnoses and all orders for this visit:    Class 2 obesity with body mass index (BMI) of 37 0 to 37 9 in adult, unspecified obesity type, unspecified whether serious comorbidity present    Acquired hypothyroidism      -Patient is pursuing Conservative Program  -Initial weight loss goal of 5-10% weight loss for improved health  -Screening labs    Initial: 238 4lbs  Current: 237 4lbs  Change: -1lbs  Goal:170lbs        Goals:  Food log (ie ) www myfitnesspal com,sparkpeople  com,loseit com,calorieking  com,etc  baritastic  No sugary beverages  At least 64oz of water daily  Increase physical activity by 10 minutes daily  Gradually increase physical activity to a goal of 5 days per week for 30 minutes of MODERATE intensity PLUS 2 days per week of FULL BODY resistance training  Goal protein intake of 80 grams per day,   5-10 servings of fruits and vegetables per day and 6132-3710 calories per day    She is interested in body comp/REE  Follow up in approximately 2 months with Non-Surgical Physician/Advanced Practitioner  Subjective:   Chief Complaint   Patient presents with    Consult     2 month mwm follow up       Patient ID: Tish Andrade  is a 34 y o  female with excess weight/obesity here to pursue weight management  Patient is pursuing Conservative Program      HPI     2 mo follow up  Lost 1lbs    Food login: 1500 cals a day  Exercise: every other day - cardio       Denies increase, hunger or cravings  Energy is good    The following portions of the patient's history were reviewed and updated as appropriate: allergies, current medications, past family history, past medical history, past social history, past surgical history and problem list     Review of Systems   Constitutional: Negative for activity change and appetite change  Respiratory: Negative  Cardiovascular: Negative      Gastrointestinal: Negative  Genitourinary: Negative  Musculoskeletal: Negative for arthralgias  Skin: Negative for rash  Psychiatric/Behavioral: Negative  Objective:    /80 (BP Location: Left arm, Patient Position: Sitting, Cuff Size: Adult)   Pulse 59   Temp 98 2 °F (36 8 °C) (Tympanic)   Resp 16   Ht 5' 6 5" (1 689 m)   Wt 108 kg (237 lb 6 4 oz)   BMI 37 74 kg/m²      Physical Exam     Constitutional   General appearance: Abnormal   well developed and obese  Eyes No conjunctival pallor  Ears, Nose, Mouth, and Throat Oral mucosa moist    Pulmonary   Respiratory effort: No increased work of breathing or signs of respiratory distress  Auscultation of lungs: Clear to auscultation, equal breath sounds bilaterally, no wheezes, no rales, no rhonci  Cardiovascular   Auscultation of heart: Normal rate and rhythm, normal S1 and S2, without murmurs  Examination of extremities for edema and/or varicosities: Normal   no edema  Abdomen   Abdomen: Abnormal   The abdomen was obese  Bowel sounds were normal  The abdomen was soft and nontender     Musculoskeletal   Gait and station: Normal     Psychiatric   Orientation to person, place and time: Normal     Affect: appropriate

## 2019-08-27 NOTE — PROGRESS NOTES
Weight Management Medical Nutrition Assessment  Ngozi Shea presented for the Body Stat package  Today's weight is 236 2#   Reevue indirect calorimter revealed REE is normal (+4%)  compared to the predictive normal for someone her same age, height, and gender  Per food recall patient often does not meet her weight loss calorie needs on her actives  Recommend [id number] on non active days and flex up to 2397-1772 calorie on her carb days  Anthropometric Measurements  Start Weight (#): 236 2#  Current Weight (#): n/a  TBW % Change from start weight:n/a  Ideal Body Weight (#):132 5#  Goal Weight (#):under 200#    Weight Loss History  Previous weight loss attempts: Self Created Diets (Portion Control, Healthy Food Choices, etc )    Food and Nutrition Related History    Food Recall  morning  Breakfast:1/2 cup cottage cheese/ strawberries  Coffee with 3 tsp / 1/2 1/2    Snack:skip  Lunch:1/2 cup brown rice and chicken or salad and chicken   Snack:quacomole with chips or carrots  Dinner:vegetables/ beans- chicken or fish   Snack:popcorn      Beverages: water  Volume of beverage intake:     Weekends:   Cravings: salty   Trouble area of day:    Frequency of Eating out:   Food restrictions:dairy/ nuts/ almond/egg  Cooking: self   Food Shopping: self    Physical Activity Intake  Activity:Walking and Running - jog mile and walking 2 mile 4 x week  and Yoga   Frequency:frequently  Physical limitations/barriers to exercise: none     Estimated Needs  Energy  Tanita: BMR: 1897 calories X 1 3 -1000 =1466 calories  Reevue Indirect Calorimeter REE: 1930 calories            Weight loss without exercise:  1343 calories          Weight loss with exercise: 6587-0657 calories                Protein:72-90      (1 2-1 5g/kg IBW)  Fluid: 70     (35mL/kg IBW)    Nutrition Diagnosis  Yes; Overweight/obesity  related to Excess energy intake as evidenced by  BMI more than normative standard for age and sex (obesity-grade II 35-39  9) Nutrition Intervention    Nutrition Prescription  Calories:7875-7375 calories sedentary and flex to 7393-2860 calories on cardio days  Protein:70-90gm  Fluid:70oz    Meal Plan  Breakfast:300/20/30  Snack:150(exercise)  Lunch:400/30/30  Snack: 150  Dinner:400/30/30  Snack:150 (exercise)    Nutrition Education:      Calorie controlled menu  Lean protein food choices  Healthy snack options  Food journaling tips      Nutrition Counseling:  Strategies: meal planning, portion sizes, healthy snack choices, hydration, fiber intake, protein intake, exercise, food journal      Monitoring and Evaluation:  Evaluation criteria:  Energy Intake  Meet protein needs  Maintain adequate hydration  Monitor weekly weight  Meal planning/preparation  Food journal   Decreased portions at mealtimes and snacks  Physical activity     Barriers to learning:  Readiness to change:   Comprehension:   Expected Compliance:

## 2019-08-28 ENCOUNTER — OFFICE VISIT (OUTPATIENT)
Dept: BARIATRICS | Facility: CLINIC | Age: 29
End: 2019-08-28

## 2019-08-28 VITALS — HEIGHT: 67 IN | BODY MASS INDEX: 37.07 KG/M2 | WEIGHT: 236.2 LBS

## 2019-08-28 DIAGNOSIS — R63.5 ABNORMAL WEIGHT GAIN: ICD-10-CM

## 2019-08-28 PROCEDURE — RECHECK: Performed by: DIETITIAN, REGISTERED

## 2019-08-28 PROCEDURE — BODSTAT PR BODY STAT: Performed by: DIETITIAN, REGISTERED

## 2019-10-25 LAB — TSH SERPL-ACNC: 2.94 MIU/L

## 2019-10-30 ENCOUNTER — OFFICE VISIT (OUTPATIENT)
Dept: FAMILY MEDICINE CLINIC | Facility: CLINIC | Age: 29
End: 2019-10-30
Payer: COMMERCIAL

## 2019-10-30 VITALS
BODY MASS INDEX: 37.35 KG/M2 | HEART RATE: 88 BPM | RESPIRATION RATE: 18 BRPM | DIASTOLIC BLOOD PRESSURE: 82 MMHG | HEIGHT: 67 IN | SYSTOLIC BLOOD PRESSURE: 114 MMHG | WEIGHT: 238 LBS | OXYGEN SATURATION: 98 %

## 2019-10-30 DIAGNOSIS — F41.1 ANXIETY STATE: Primary | ICD-10-CM

## 2019-10-30 DIAGNOSIS — E03.9 ACQUIRED HYPOTHYROIDISM: ICD-10-CM

## 2019-10-30 DIAGNOSIS — Z23 NEED FOR INFLUENZA VACCINATION: ICD-10-CM

## 2019-10-30 DIAGNOSIS — E55.9 VITAMIN D DEFICIENCY: ICD-10-CM

## 2019-10-30 DIAGNOSIS — E66.9 CLASS 2 OBESITY WITH BODY MASS INDEX (BMI) OF 37.0 TO 37.9 IN ADULT, UNSPECIFIED OBESITY TYPE, UNSPECIFIED WHETHER SERIOUS COMORBIDITY PRESENT: ICD-10-CM

## 2019-10-30 DIAGNOSIS — K58.2 IRRITABLE BOWEL SYNDROME WITH BOTH CONSTIPATION AND DIARRHEA: ICD-10-CM

## 2019-10-30 DIAGNOSIS — Z12.4 CERVICAL CANCER SCREENING: ICD-10-CM

## 2019-10-30 PROCEDURE — 90471 IMMUNIZATION ADMIN: CPT | Performed by: FAMILY MEDICINE

## 2019-10-30 PROCEDURE — 99214 OFFICE O/P EST MOD 30 MIN: CPT | Performed by: FAMILY MEDICINE

## 2019-10-30 PROCEDURE — 3008F BODY MASS INDEX DOCD: CPT | Performed by: FAMILY MEDICINE

## 2019-10-30 PROCEDURE — 90686 IIV4 VACC NO PRSV 0.5 ML IM: CPT | Performed by: FAMILY MEDICINE

## 2019-10-30 RX ORDER — LEVOTHYROXINE SODIUM 0.1 MG/1
100 TABLET ORAL DAILY
Qty: 90 TABLET | Refills: 1 | Status: SHIPPED | OUTPATIENT
Start: 2019-10-30 | End: 2020-04-30 | Stop reason: SDUPTHER

## 2019-10-30 NOTE — PROGRESS NOTES
Assessment/Plan:   Diagnoses and all orders for this visit:    Anxiety state  -     Ambulatory referral to [facility]; Future  - per pt was doing "really well" over the summer but "in August felt it peter out  - has not yet found a therapist - strongly encouraged her to do so - referred to Alena Saul in the office and also to [employer], [employer]  com, [employer]  com   - aware that if feeling worse to f/u with her Psychiatrist - Dr Yanira Lane in 6months for f/u and annual exam - pt aware and agreeable     Vitamin D deficiency  -     Vitamin D 25 hydroxy; Future  - still finishing off her Vit D 25527TH Qwk and then to take Vit D OTC 2000IU QD     Irritable bowel syndrome with both constipation and diarrhea  -     Comprehensive metabolic panel; Future  -     CBC and differential; Future  - has a referral for GI   - though I believe this is linked to her anxiety/depression/? PTSD hx - strongly urged to follow with a therapist and f/u with her psychiatrist - pt aware     Acquired hypothyroidism  -     TSH, 3rd generation with Free T4 reflex  -     levothyroxine 100 mcg tablet; Take 1 tablet (100 mcg total) by mouth daily  - nml TSH - well controlled - cont current regimen     Class 2 obesity with body mass index (BMI) of 37 0 to 37 9 in adult, unspecified obesity type, unspecified whether serious comorbidity present  -     Lipid panel;  Future  - BMI 37 8 in the office today   [facility] and with their Dietician/Nutritionist on staff  - discussed diet and encouraged exercise  - educated that it takes 3500 karli to lose 1lb  - advised to cut down on carbs, 5 servings of fruits/veggies/day, increase water intake (65-80oz/water/day)   - appropriate weight loss goal for women = 0 5-1lb/week  - per the Heart Association - 150mins/exercise/week, but to lose and maintain weight 200-300mins/exercise/week     Need for influenza vaccination  -     influenza vaccine, [id number], quadrivalent, 0 5 mL, preservative-free, for adult and pediatric patients 6 mos+ (AFLURIA, FLUARIX, FLULAVAL, FLUZONE)    Cervical cancer screening  -     Ambulatory referral to Obstetrics / Gynecology; Future  - overdue for a PAP - didn't feel comfortable at her last Ob appt - advised to schedule routine screening - pt aware and agreeable         Subjective:    Patient ID: Jordi Martinez is a 34 y o  female    27yo F presents to the office for f/u of multiple medical conditions  - h/o anxiety/depression - "doing really well over the summer, in August felt it peter out" - has not yet found a therapist, has not f/u with Dr Vero Woodward (her former psychiatrist)   - is following with Bariatrics for weight management - conservative measures with Dietician/Nutritionist - has to reschedule her OV as was on vacation - weight in the office today = 238lbs, ate 1/2 cup of cottage cheese this AM, denies hunger  - started working at a [employer] in [place] 2wks ago - UTD with Tdap and interested in getting the Flu vaccine today   - has not followed with GI for IBS - was given referral at last [address]   - did see Ob/Gyn but did not feel comfortable getting a PAP at that visit   - today in the office pt denies F/C/N/V/CP/SOB/wheezing/abd pain/LE edema   - intermittent chest "fluttering"/palpitations, intermittent D/C (as per her usual)   - TSH (10/24/2019) = 2 94 - takes Levothyroxine 100mcg QD and tolerating it well   - has been taking Vit D 50,000IU Qwk and tolerating it well     The following portions of the patient's history were reviewed and updated as appropriate: allergies, current medications, past family history, past medical history, past social history, past surgical history and problem list     Review of Systems  as per HPI    Objective:  /82 (BP Location: Left arm, Patient Position: Sitting, Cuff Size: Large)   Pulse 88   Resp 18   Ht 5' 6 5" (1 689 m)   Wt 108 kg (238 lb)   SpO2 98%   BMI 37 84 kg/m²    Physical Exam   Constitutional: She is oriented to person, place, and time  She appears well-developed and well-nourished  No distress  HENT:   Head: Normocephalic and atraumatic  Right Ear: External ear normal    Left Ear: External ear normal    Nose: Nose normal    Eyes: Conjunctivae and EOM are normal  Right eye exhibits no discharge  Left eye exhibits no discharge  No scleral icterus  Neck: Normal range of motion  Neck supple  Cardiovascular: Normal rate, regular rhythm and normal heart sounds  Exam reveals no gallop and no friction rub  No murmur heard  Pulmonary/Chest: Effort normal and breath sounds normal  No stridor  No respiratory distress  She has no wheezes  She has no rales  Abdominal: Soft  Bowel sounds are normal    Musculoskeletal: Normal range of motion  Neurological: She is alert and oriented to person, place, and time  Skin: Skin is warm  She is not diaphoretic  Psychiatric: She has a normal mood and affect  Her behavior is normal    Vitals reviewed

## 2020-01-13 DIAGNOSIS — E55.9 VITAMIN D DEFICIENCY: ICD-10-CM

## 2020-01-14 RX ORDER — METHOCARBAMOL 750 MG/1
TABLET ORAL
Qty: 25 CAPSULE | Refills: 0 | Status: SHIPPED | OUTPATIENT
Start: 2020-01-14 | End: 2020-05-08 | Stop reason: ALTCHOICE

## 2020-04-29 LAB
25(OH)D3 SERPL-MCNC: 50 NG/ML (ref 30–100)
ALBUMIN SERPL-MCNC: 4.4 G/DL (ref 3.6–5.1)
ALBUMIN/GLOB SERPL: 1.5 (CALC) (ref 1–2.5)
ALP SERPL-CCNC: 71 U/L (ref 31–125)
ALT SERPL-CCNC: 8 U/L (ref 6–29)
AST SERPL-CCNC: 13 U/L (ref 10–30)
BASOPHILS # BLD AUTO: 91 CELLS/UL (ref 0–200)
BASOPHILS NFR BLD AUTO: 1.6 %
BILIRUB SERPL-MCNC: 0.7 MG/DL (ref 0.2–1.2)
BUN SERPL-MCNC: 10 MG/DL (ref 7–25)
BUN/CREAT SERPL: NORMAL (CALC) (ref 6–22)
CALCIUM SERPL-MCNC: 9.1 MG/DL (ref 8.6–10.2)
CHLORIDE SERPL-SCNC: 105 MMOL/L (ref 98–110)
CHOLEST SERPL-MCNC: 182 MG/DL
CHOLEST/HDLC SERPL: 2.8 (CALC)
CO2 SERPL-SCNC: 25 MMOL/L (ref 20–32)
CREAT SERPL-MCNC: 0.79 MG/DL (ref 0.5–1.1)
EOSINOPHIL # BLD AUTO: 143 CELLS/UL (ref 15–500)
EOSINOPHIL NFR BLD AUTO: 2.5 %
ERYTHROCYTE [DISTWIDTH] IN BLOOD BY AUTOMATED COUNT: 12.3 % (ref 11–15)
GLOBULIN SER CALC-MCNC: 2.9 G/DL (CALC) (ref 1.9–3.7)
GLUCOSE SERPL-MCNC: 76 MG/DL (ref 65–99)
HCT VFR BLD AUTO: 40.3 % (ref 35–45)
HDLC SERPL-MCNC: 66 MG/DL
HGB BLD-MCNC: 13.3 G/DL (ref 11.7–15.5)
LDLC SERPL CALC-MCNC: 100 MG/DL (CALC)
LYMPHOCYTES # BLD AUTO: 1619 CELLS/UL (ref 850–3900)
LYMPHOCYTES NFR BLD AUTO: 28.4 %
MCH RBC QN AUTO: 30.9 PG (ref 27–33)
MCHC RBC AUTO-ENTMCNC: 33 G/DL (ref 32–36)
MCV RBC AUTO: 93.5 FL (ref 80–100)
MONOCYTES # BLD AUTO: 410 CELLS/UL (ref 200–950)
MONOCYTES NFR BLD AUTO: 7.2 %
NEUTROPHILS # BLD AUTO: 3437 CELLS/UL (ref 1500–7800)
NEUTROPHILS NFR BLD AUTO: 60.3 %
NONHDLC SERPL-MCNC: 116 MG/DL (CALC)
PLATELET # BLD AUTO: 266 THOUSAND/UL (ref 140–400)
PMV BLD REES-ECKER: 12.3 FL (ref 7.5–12.5)
POTASSIUM SERPL-SCNC: 4.2 MMOL/L (ref 3.5–5.3)
PROT SERPL-MCNC: 7.3 G/DL (ref 6.1–8.1)
RBC # BLD AUTO: 4.31 MILLION/UL (ref 3.8–5.1)
SL AMB EGFR AFRICAN AMERICAN: 118 ML/MIN/1.73M2
SL AMB EGFR NON AFRICAN AMERICAN: 102 ML/MIN/1.73M2
SODIUM SERPL-SCNC: 138 MMOL/L (ref 135–146)
TRIGL SERPL-MCNC: 70 MG/DL
TSH SERPL-ACNC: 3.65 MIU/L
WBC # BLD AUTO: 5.7 THOUSAND/UL (ref 3.8–10.8)

## 2020-04-30 DIAGNOSIS — E03.9 ACQUIRED HYPOTHYROIDISM: ICD-10-CM

## 2020-05-01 RX ORDER — LEVOTHYROXINE SODIUM 0.1 MG/1
100 TABLET ORAL DAILY
Qty: 90 TABLET | Refills: 1 | Status: SHIPPED | OUTPATIENT
Start: 2020-05-01 | End: 2020-11-03 | Stop reason: SDUPTHER

## 2020-05-08 ENCOUNTER — OFFICE VISIT (OUTPATIENT)
Dept: FAMILY MEDICINE CLINIC | Facility: CLINIC | Age: 30
End: 2020-05-08
Payer: COMMERCIAL

## 2020-05-08 VITALS
HEART RATE: 74 BPM | WEIGHT: 229 LBS | DIASTOLIC BLOOD PRESSURE: 80 MMHG | RESPIRATION RATE: 16 BRPM | SYSTOLIC BLOOD PRESSURE: 130 MMHG | TEMPERATURE: 99 F | BODY MASS INDEX: 35.94 KG/M2 | HEIGHT: 67 IN

## 2020-05-08 DIAGNOSIS — E66.9 CLASS 2 OBESITY WITH BODY MASS INDEX (BMI) OF 36.0 TO 36.9 IN ADULT, UNSPECIFIED OBESITY TYPE, UNSPECIFIED WHETHER SERIOUS COMORBIDITY PRESENT: ICD-10-CM

## 2020-05-08 DIAGNOSIS — E55.9 VITAMIN D DEFICIENCY: ICD-10-CM

## 2020-05-08 DIAGNOSIS — K58.2 IRRITABLE BOWEL SYNDROME WITH BOTH CONSTIPATION AND DIARRHEA: ICD-10-CM

## 2020-05-08 DIAGNOSIS — Z91.09 ENVIRONMENTAL ALLERGIES: ICD-10-CM

## 2020-05-08 DIAGNOSIS — Z00.00 ANNUAL PHYSICAL EXAM: Primary | ICD-10-CM

## 2020-05-08 DIAGNOSIS — R21 RASH: ICD-10-CM

## 2020-05-08 DIAGNOSIS — E03.9 ACQUIRED HYPOTHYROIDISM: ICD-10-CM

## 2020-05-08 PROCEDURE — 99395 PREV VISIT EST AGE 18-39: CPT | Performed by: FAMILY MEDICINE

## 2020-05-08 PROCEDURE — 3008F BODY MASS INDEX DOCD: CPT | Performed by: FAMILY MEDICINE

## 2020-10-31 DIAGNOSIS — E03.9 ACQUIRED HYPOTHYROIDISM: ICD-10-CM

## 2020-11-03 DIAGNOSIS — E03.9 ACQUIRED HYPOTHYROIDISM: ICD-10-CM

## 2020-11-04 RX ORDER — LEVOTHYROXINE SODIUM 0.1 MG/1
TABLET ORAL
Qty: 90 TABLET | Refills: 1 | OUTPATIENT
Start: 2020-11-04

## 2020-11-04 RX ORDER — LEVOTHYROXINE SODIUM 0.1 MG/1
100 TABLET ORAL DAILY
Qty: 90 TABLET | Refills: 0 | Status: SHIPPED | OUTPATIENT
Start: 2020-11-04 | End: 2020-11-11

## 2020-11-10 LAB
T4 FREE SERPL-MCNC: 1 NG/DL (ref 0.8–1.8)
TSH SERPL-ACNC: 7.58 MIU/L

## 2020-11-11 ENCOUNTER — TELEMEDICINE (OUTPATIENT)
Dept: FAMILY MEDICINE CLINIC | Facility: CLINIC | Age: 30
End: 2020-11-11
Payer: COMMERCIAL

## 2020-11-11 DIAGNOSIS — S60.512A CAT SCRATCH OF LEFT HAND, INITIAL ENCOUNTER: ICD-10-CM

## 2020-11-11 DIAGNOSIS — Z20.822 EXPOSURE TO COVID-19 VIRUS: ICD-10-CM

## 2020-11-11 DIAGNOSIS — E03.9 ACQUIRED HYPOTHYROIDISM: Primary | ICD-10-CM
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
## 2020-11-11 PROCEDURE — 99214 OFFICE O/P EST MOD 30 MIN: CPT | Performed by: FAMILY MEDICINE

## 2020-11-11 PROCEDURE — 1036F TOBACCO NON-USER: CPT | Performed by: FAMILY MEDICINE

## 2020-11-11 RX ORDER — LEVOTHYROXINE SODIUM 112 UG/1
112 TABLET ORAL DAILY
Qty: 60 TABLET | Refills: 0 | Status: SHIPPED | OUTPATIENT
Start: 2020-11-11 | End: 2021-01-13

## 2020-11-11 RX ORDER — AZITHROMYCIN 250 MG/1
TABLET, FILM COATED ORAL
Qty: 6 TABLET | Refills: 0 | Status: SHIPPED | OUTPATIENT
Start: 2020-11-11 | End: 2020-11-16

## 2020-11-12 DIAGNOSIS — Z20.822 EXPOSURE TO COVID-19 VIRUS: ICD-10-CM

## 2020-11-12 PROCEDURE — U0003 INFECTIOUS AGENT DETECTION BY NUCLEIC ACID (DNA OR RNA); SEVERE ACUTE RESPIRATORY SYNDROME CORONAVIRUS 2 (SARS-COV-2) (CORONAVIRUS DISEASE [COVID-19]), AMPLIFIED PROBE TECHNIQUE, MAKING USE OF HIGH THROUGHPUT TECHNOLOGIES AS DESCRIBED BY CMS-2020-01-R: HCPCS | Performed by: FAMILY MEDICINE

## 2020-11-13 LAB — SARS-COV-2 RNA SPEC QL NAA+PROBE: NOT DETECTED

## 2021-01-05 DIAGNOSIS — E03.9 ACQUIRED HYPOTHYROIDISM: ICD-10-CM

## 2021-01-05 RX ORDER — LEVOTHYROXINE SODIUM 112 UG/1
112 TABLET ORAL DAILY
Qty: 60 TABLET | Refills: 0 | OUTPATIENT
Start: 2021-01-05

## 2021-01-07 DIAGNOSIS — E03.9 ACQUIRED HYPOTHYROIDISM: ICD-10-CM

## 2021-01-12 DIAGNOSIS — E03.9 ACQUIRED HYPOTHYROIDISM: ICD-10-CM

## 2021-01-12 LAB
T4 FREE SERPL-MCNC: 1.2 NG/DL (ref 0.8–1.8)
TSH SERPL-ACNC: 6.23 MIU/L

## 2021-01-13 ENCOUNTER — TELEMEDICINE (OUTPATIENT)
Dept: FAMILY MEDICINE CLINIC | Facility: CLINIC | Age: 31
End: 2021-01-13
Payer: COMMERCIAL

## 2021-01-13 DIAGNOSIS — E66.9 CLASS 2 OBESITY WITH BODY MASS INDEX (BMI) OF 36.0 TO 36.9 IN ADULT, UNSPECIFIED OBESITY TYPE, UNSPECIFIED WHETHER SERIOUS COMORBIDITY PRESENT: ICD-10-CM

## 2021-01-13 DIAGNOSIS — E55.9 VITAMIN D DEFICIENCY: ICD-10-CM

## 2021-01-13 DIAGNOSIS — E03.9 ACQUIRED HYPOTHYROIDISM: Primary | ICD-10-CM

## 2021-01-13 PROCEDURE — 99214 OFFICE O/P EST MOD 30 MIN: CPT | Performed by: FAMILY MEDICINE

## 2021-01-13 PROCEDURE — 3725F SCREEN DEPRESSION PERFORMED: CPT | Performed by: FAMILY MEDICINE

## 2021-01-13 PROCEDURE — 1036F TOBACCO NON-USER: CPT | Performed by: FAMILY MEDICINE

## 2021-01-13 RX ORDER — LEVOTHYROXINE SODIUM 137 UG/1
137 TABLET ORAL DAILY
Qty: 30 TABLET | Refills: 1 | Status: SHIPPED | OUTPATIENT
Start: 2021-01-27 | End: 2021-02-24 | Stop reason: SDUPTHER

## 2021-01-13 RX ORDER — LEVOTHYROXINE SODIUM 112 UG/1
TABLET ORAL
Qty: 60 TABLET | Refills: 0 | OUTPATIENT
Start: 2021-01-13

## 2021-01-13 RX ORDER — LEVOTHYROXINE SODIUM 0.12 MG/1
137 TABLET ORAL DAILY
Qty: 14 TABLET | Refills: 0 | Status: SHIPPED | OUTPATIENT
Start: 2021-01-13 | End: 2021-01-29 | Stop reason: SDUPTHER

## 2021-01-13 RX ORDER — LEVOTHYROXINE SODIUM 112 UG/1
112 TABLET ORAL DAILY
Qty: 60 TABLET | Refills: 0 | OUTPATIENT
Start: 2021-01-13

## 2021-01-13 NOTE — PROGRESS NOTES
Virtual Regular Visit    Assessment/Plan:  Problem List Items Addressed This Visit        Endocrine    Hypothyroidism - Primary    Relevant Medications    levothyroxine 125 mcg tablet    levothyroxine 137 mcg tablet (Start on 1/27/2021)    Other Relevant Orders    TSH, 3rd generation with Free T4 reflex  - TSH (1/11/2021) 6 23 <-- 7 58   - will increase Levothyroxine from 112mcg QAM to 125mcg QAM x2wks and then further increase from 125mcg QAM to 137mcg QAM and repeat labs in 6wks   - f/u in 6wks after labs - pt aware and agreeable        Other    Class 2 obesity with body mass index (BMI) of 36 0 to 36 9 in adult  - discussed diet and encouraged exercise  - educated that it takes 3500 karli to lose 1lb  - advised to cut down on carbs, 5 servings of fruits/veggies/day, increase water intake (65-80oz/water/day)   - appropriate weight loss goal for women = 0 5-1lb/week  - per the Heart Association - 150mins/exercise/week, but to lose and maintain weight 200-300mins/exercise/week   - t/c referral to [facility] at next OV       Other Visit Diagnoses     Vitamin D deficiency      - advised to restart taking Vit D supplements            Reason for visit is f/u of abnml labs   Chief Complaint   Patient presents with    Virtual Regular Visit        Encounter provider Bridger Adamson DO  Provider located at [address] [id number]      Recent Visits  No visits were found meeting these conditions  Showing recent visits within past 7 days and meeting all other requirements     Today's Visits  Date Type Provider Dept   01/13/21 Telemedicine Riddhi Hill DO [facility]   Showing today's visits and meeting all other requirements     Future Appointments  No visits were found meeting these conditions     Showing future appointments within next 150 days and meeting all other requirements        The patient was identified by name and date of birth  Safia Enrique was informed that this is a telemedicine visit and that the visit is being conducted through [facility] and patient was informed that this is a secure, HIPAA-compliant platform  She agrees to proceed     My office door was closed  No one else was in the room  She acknowledged consent and understanding of privacy and security of the video platform  The patient has agreed to participate and understands they can discontinue the visit at any time  Patient is aware this is a billable service  Subjective  Safia Enrique is a 31 y o  female who presents virtually for f/u of abnml labs  HPI   - TSH (1/11/2021) 6 23 <-- 7 58   - TSH was as high as 14 when initially diagnosed 14yrs ago     - currently on Levothyroxine 112mcg QD  - (+) tried, legs feel weak, feels worn out at the end of the day ("flu-like")   - (+) unmotivated  - states her anxiety is well controlled - worried about the "normal" - COVID/work, etc   - has not been taking Vit D supplements     Past Medical History:   Diagnosis Date    Depression     GERD (gastroesophageal reflux disease)     Hypothyroidism     Panic attacks     Varicella        Past Surgical History:   Procedure Laterality Date    WISDOM TOOTH EXTRACTION         Current Outpatient Medications   Medication Sig Dispense Refill    levothyroxine 125 mcg tablet Take 1 tablet (125 mcg total) by mouth daily 14 tablet 0    [START ON 1/27/2021] levothyroxine 137 mcg tablet Take 1 tablet (137 mcg total) by mouth daily 30 tablet 1     No current facility-administered medications for this visit  Allergies   Allergen Reactions    Other Swelling     almonds       Review of Systems as per HPI     Video Exam  There were no vitals filed for this visit      Physical Exam   General: AAOx3, NAD, obese   HEENT: NC/AT, wears glasses, EOMI, clear conjunctiva, nml external ear and nose   Cardio: deferred  Pulm: no acute respiratory distress, able to talk in complete sentences w/o getting short of breath   Abd: deferred   Psych: nml mood/affect/behavior     I spent 20 minutes directly with the patient during this visit    BMI Counseling: There is no height or weight on file to calculate BMI  The BMI is above normal  Nutrition recommendations include reducing portion sizes and decreasing overall calorie intake  Exercise recommendations include moderate aerobic physical activity for 150 minutes/week and exercising 3-5 times per week  VIRTUAL VISIT [address], [address] acknowledges that she has consented to an online visit or consultation  She understands that the online visit is based solely on information provided by her, and that, in the absence of a face-to-face physical evaluation by the physician, the diagnosis she receives is both limited and provisional in terms of accuracy and completeness  This is not intended to replace a full medical face-to-face evaluation by the physician  Triny Dennis understands and accepts these terms

## 2021-01-27 ENCOUNTER — TELEPHONE (OUTPATIENT)
Dept: FAMILY MEDICINE CLINIC | Facility: CLINIC | Age: 31
End: 2021-01-27

## 2021-01-27 NOTE — TELEPHONE ENCOUNTER
indra called stating she's berne experiencing some side effects of her medication, such as nausea, anxiety    Wanted to know what should be done    Please call back

## 2021-01-28 NOTE — TELEPHONE ENCOUNTER
Patient states the past 5 days she has been having shaking and nausea and some anxiety    tomorrow she is suppose to up the dose but she is concerned with the recent side effects   Please advise

## 2021-01-29 DIAGNOSIS — E03.9 ACQUIRED HYPOTHYROIDISM: ICD-10-CM

## 2021-01-29 RX ORDER — LEVOTHYROXINE SODIUM 0.12 MG/1
137 TABLET ORAL DAILY
Qty: 60 TABLET | Refills: 0 | Status: SHIPPED | OUTPATIENT
Start: 2021-01-29 | End: 2021-02-24

## 2021-01-29 NOTE — TELEPHONE ENCOUNTER
Tried to LM for pt but VM full  Please advise to cont at 125mcg QAM and not to increase up to next higher dosage    Will be running out of 125mcg tabs - 60tabs sent to pharmacy on file

## 2021-02-03 NOTE — TELEPHONE ENCOUNTER
Patient did increase dosage and feels a little better    she will monitor as long as it continues to improve she will cont med and follow up on the 24th

## 2021-02-18 LAB — TSH SERPL-ACNC: 1.2 MIU/L

## 2021-02-19 ENCOUNTER — TELEPHONE (OUTPATIENT)
Dept: FAMILY MEDICINE CLINIC | Facility: CLINIC | Age: 31
End: 2021-02-19

## 2021-02-19 NOTE — TELEPHONE ENCOUNTER
----- Message from Randy Trujillo DO sent at 2/19/2021  9:18 AM EST -----  nml TSH - cont current regimen

## 2021-02-24 ENCOUNTER — OFFICE VISIT (OUTPATIENT)
Dept: FAMILY MEDICINE CLINIC | Facility: CLINIC | Age: 31
End: 2021-02-24
Payer: COMMERCIAL

## 2021-02-24 VITALS
WEIGHT: 247 LBS | DIASTOLIC BLOOD PRESSURE: 70 MMHG | OXYGEN SATURATION: 99 % | HEART RATE: 80 BPM | RESPIRATION RATE: 16 BRPM | BODY MASS INDEX: 38.77 KG/M2 | HEIGHT: 67 IN | SYSTOLIC BLOOD PRESSURE: 122 MMHG

## 2021-02-24 DIAGNOSIS — Z12.4 CERVICAL CANCER SCREENING: ICD-10-CM

## 2021-02-24 DIAGNOSIS — E03.9 ACQUIRED HYPOTHYROIDISM: Primary | ICD-10-CM

## 2021-02-24 DIAGNOSIS — R11.0 NAUSEA: ICD-10-CM

## 2021-02-24 PROCEDURE — 1036F TOBACCO NON-USER: CPT | Performed by: FAMILY MEDICINE

## 2021-02-24 PROCEDURE — 3008F BODY MASS INDEX DOCD: CPT | Performed by: FAMILY MEDICINE

## 2021-02-24 PROCEDURE — 99214 OFFICE O/P EST MOD 30 MIN: CPT | Performed by: FAMILY MEDICINE

## 2021-02-24 RX ORDER — LEVOTHYROXINE SODIUM 137 UG/1
137 TABLET ORAL DAILY
Qty: 60 TABLET | Refills: 0 | Status: SHIPPED | OUTPATIENT
Start: 2021-02-24 | End: 2021-04-09 | Stop reason: SDUPTHER

## 2021-02-24 RX ORDER — ONDANSETRON 4 MG/1
4 TABLET, ORALLY DISINTEGRATING ORAL EVERY 6 HOURS PRN
Qty: 20 TABLET | Refills: 0 | Status: SHIPPED | OUTPATIENT
Start: 2021-02-24 | End: 2021-05-12

## 2021-02-24 NOTE — PROGRESS NOTES
Assessment/Plan:   Diagnoses and all orders for this visit:    Acquired hypothyroidism  -     levothyroxine 137 mcg tablet; Take 1 tablet (137 mcg total) by mouth daily  -     TSH, 3rd generation with Free T4 reflex; Future  - TSH (2/17/2021): 1 20 <-- 6 23 (1/11/2021) <-- 7 58 (11/9/2020)   - s/s improving   - cont Levothyroxine 137mcg QAM   - f/u in 6wks with repeat labs - pt aware and agreeable   Nausea  -     ondansetron (ZOFRAN-ODT) 4 mg disintegrating tablet; Take 1 tablet (4 mg total) by mouth every 6 (six) hours as needed for nausea or vomiting    BMI 39 0-39 9,adult  - BMI 39 3  - discussed diet and encouraged exercise  - educated that it takes 3500 karli to lose 1lb  - advised to cut down on carbs, 5 servings of fruits/veggies/day, increase water intake (65-80oz/water/day)   - appropriate weight loss goal for women = 0 5-1lb/week  - per the Heart Association - 150mins/exercise/week, but to lose and maintain weight 200-300mins/exercise/week   - advised to reestablish with Nutritionist - pt aware and agreeable     Cervical cancer screening  -     Ambulatory referral to Gynecology; Future    Other orders  -     Cancel: influenza vaccine, quadrivalent, 0 5 mL, preservative-free, for adult and pediatric patients 6 mos+ (AFLURIA, FLUARIX, FLULAVAL, FLUZONE)        Subjective:    Patient ID: Yury Lawrence is a 36 y o  female    HPI   36yo F presents to the office for f/u   - noted on Sat that starting to feel better  - currently on Levothyroxine 137mcg QAM which she started 2wks ago   - repeat TSH within normal limits   - still slightly fatigued but not like before   - no longer feels like legs are weak or feels worn out   - motivation has improved   - denies F/C/palpitations  - (+) nausea and thus not eating as much     The following portions of the patient's history were reviewed and updated as appropriate: allergies, current medications, past family history, past medical history, past social history, past surgical history and problem list     Review of Systems  as per HPI    Objective:  /70   Pulse 80   Resp 16   Ht 5' 6 5" (1 689 m)   Wt 112 kg (247 lb)   SpO2 99%   BMI 39 27 kg/m²    Physical Exam  Vitals signs reviewed  Constitutional:       General: She is not in acute distress  Appearance: She is obese  She is not ill-appearing, toxic-appearing or diaphoretic  HENT:      Head: Normocephalic and atraumatic  Right Ear: External ear normal       Left Ear: External ear normal    Eyes:      General: No scleral icterus  Right eye: No discharge  Left eye: No discharge  Extraocular Movements: Extraocular movements intact  Conjunctiva/sclera: Conjunctivae normal    Neck:      Musculoskeletal: Normal range of motion and neck supple  Cardiovascular:      Rate and Rhythm: Normal rate and regular rhythm  Heart sounds: Normal heart sounds  No murmur  No friction rub  No gallop  Pulmonary:      Effort: Pulmonary effort is normal  No respiratory distress  Breath sounds: Normal breath sounds  No stridor  No wheezing, rhonchi or rales  Abdominal:      General: Bowel sounds are normal  There is no distension  Palpations: Abdomen is soft  There is no mass  Tenderness: There is no abdominal tenderness  There is no guarding or rebound  Comments: BMI 39 3   Musculoskeletal: Normal range of motion  General: No swelling, tenderness, deformity or signs of injury  Right lower leg: No edema  Left lower leg: No edema  Skin:     General: Skin is warm  Neurological:      General: No focal deficit present  Mental Status: She is alert and oriented to person, place, and time  Psychiatric:         Mood and Affect: Mood normal          Behavior: Behavior normal        BMI Counseling: Body mass index is 39 27 kg/m²  The BMI is above normal  Nutrition recommendations include reducing portion sizes and decreasing overall calorie intake  Exercise recommendations include moderate aerobic physical activity for 150 minutes/week and exercising 3-5 times per week

## 2021-04-08 LAB — TSH SERPL-ACNC: 1.49 MIU/L

## 2021-04-09 ENCOUNTER — TELEMEDICINE (OUTPATIENT)
Dept: FAMILY MEDICINE CLINIC | Facility: CLINIC | Age: 31
End: 2021-04-09
Payer: COMMERCIAL

## 2021-04-09 DIAGNOSIS — E03.9 ACQUIRED HYPOTHYROIDISM: ICD-10-CM

## 2021-04-09 DIAGNOSIS — Z20.822 EXPOSURE TO COVID-19 VIRUS: Primary | ICD-10-CM

## 2021-04-09 DIAGNOSIS — Z20.822 EXPOSURE TO COVID-19 VIRUS: ICD-10-CM

## 2021-04-09 PROCEDURE — 99213 OFFICE O/P EST LOW 20 MIN: CPT | Performed by: FAMILY MEDICINE

## 2021-04-09 PROCEDURE — U0003 INFECTIOUS AGENT DETECTION BY NUCLEIC ACID (DNA OR RNA); SEVERE ACUTE RESPIRATORY SYNDROME CORONAVIRUS 2 (SARS-COV-2) (CORONAVIRUS DISEASE [COVID-19]), AMPLIFIED PROBE TECHNIQUE, MAKING USE OF HIGH THROUGHPUT TECHNOLOGIES AS DESCRIBED BY CMS-2020-01-R: HCPCS | Performed by: FAMILY MEDICINE

## 2021-04-09 PROCEDURE — 1036F TOBACCO NON-USER: CPT | Performed by: FAMILY MEDICINE

## 2021-04-09 PROCEDURE — U0005 INFEC AGEN DETEC AMPLI PROBE: HCPCS | Performed by: FAMILY MEDICINE

## 2021-04-09 RX ORDER — LEVOTHYROXINE SODIUM 137 UG/1
137 TABLET ORAL DAILY
Qty: 90 TABLET | Refills: 0 | Status: SHIPPED | OUTPATIENT
Start: 2021-04-09 | End: 2021-05-12 | Stop reason: SDUPTHER

## 2021-04-09 NOTE — PROGRESS NOTES
COVID-19 Outpatient Progress Note    Assessment/Plan:  Problem List Items Addressed This Visit        Endocrine    Hypothyroidism    Relevant Medications    levothyroxine 137 mcg tablet    Other Relevant Orders    TSH, 3rd generation with Free T4 reflex  - TSH within range   - cont Levothyroxine at 137mcg QAM for now  - recheck labs in 6wks - pt aware and agreeable       Other Visit Diagnoses     Exposure to COVID-19 virus    -  Primary    Relevant Orders    Novel Coronavirus (Covid-19),PCR [facility] - Collected at [facility] or [facility]  - exposed to [address] co-worker all last week   - pt with s/s - advised to get screened - pt aware and agreeable  - COVID precautions d/w pt   - f/u PRN          Disposition:     I referred patient to one of our centralized sites for a COVID-19 swab  I have spent 20 minutes directly with the patient  Greater than 50% of this time was spent in counseling/coordination of care regarding: diagnostic results, prognosis, risks and benefits of treatment options, instructions for management, patient and family education, importance of treatment compliance, risk factor reductions and impressions  Encounter provider Alphonza Lennox, DO    Provider located at [address] [place] [address]    Recent Visits  No visits were found meeting these conditions  Showing recent visits within past 7 days and meeting all other requirements     Today's Visits  Date Type Provider Dept   04/09/21 Telemedicine Riddhi Melvin DO [facility]   Showing today's visits and meeting all other requirements     Future Appointments  No visits were found meeting these conditions  Showing future appointments within next 150 days and meeting all other requirements      This virtual check-in was done via [employer] and patient was informed that this is a secure, HIPAA-compliant platform  She agrees to proceed      Patient agrees to participate in a virtual check in via telephone or video visit instead of presenting to the office to address urgent/immediate medical needs  Patient is aware this is a billable service  After connecting through [facility], the patient was identified by name and date of birth  Dennise Sanchez was informed that this was a telemedicine visit and that the exam was being conducted confidentially over secure lines  My office door was closed  No one else was in the room  Dennise Sanchez acknowledged consent and understanding of privacy and security of the telemedicine visit  I informed the patient that I have reviewed her record in Epic and presented the opportunity for her to ask any questions regarding the visit today  The patient agreed to participate  Subjective:   Dennise Sanchez is a 36 y o  female who is concerned about COVID-19  Patient's symptoms include fatigue, nasal congestion, nausea and headache  Patient denies fever, chills, sore throat, anosmia, loss of taste, cough, shortness of breath, chest tightness, abdominal pain, vomiting, diarrhea and myalgias       Date of symptom onset: 4/3/2021  Date of exposure: 4/2/2021    Exposure:   Contact with a person who is under investigation (PUI) for or who is positive for COVID-19 within the last 14 days?: Yes    Hospitalized recently for fever and/or lower respiratory symptoms?: No      Currently a healthcare worker that is involved in direct patient care?: No      Works in a special setting where the risk of COVID-19 transmission may be high? (this may include long-term care, correctional and [facility] facilities; homeless shelters; assisted-living facilities and group homes ): No      Resident in a special setting where the risk of COVID-19 transmission may be high? (this may include long-term care, correctional and [facility] facilities; homeless shelters; assisted-living facilities and group homes ): No      - Pt was exposed to [address] coworker all of last week  - pt started with s/s (HA, nasal congestion, N and fatigue) since last week     Lab Results   Component Value Date    SARSCOV2 Not Detected 11/12/2020     Past Medical History:   Diagnosis Date    Depression     GERD (gastroesophageal reflux disease)     Hypothyroidism     Panic attacks     Varicella      Past Surgical History:   Procedure Laterality Date    WISDOM TOOTH EXTRACTION       Current Outpatient Medications   Medication Sig Dispense Refill    levothyroxine 137 mcg tablet Take 1 tablet (137 mcg total) by mouth daily 90 tablet 0    ondansetron (ZOFRAN-ODT) 4 mg disintegrating tablet Take 1 tablet (4 mg total) by mouth every 6 (six) hours as needed for nausea or vomiting 20 tablet 0     No current facility-administered medications for this visit  Allergies   Allergen Reactions    Other Swelling     almonds       Review of Systems   Constitutional: Positive for fatigue  Negative for chills and fever  HENT: Positive for congestion  Negative for sore throat  Respiratory: Negative for cough, chest tightness and shortness of breath  Gastrointestinal: Positive for nausea  Negative for abdominal pain, diarrhea and vomiting  Musculoskeletal: Negative for myalgias  Neurological: Positive for headaches  Objective: There were no vitals filed for this visit  Physical Exam   General: AAOx3, NAD, Obese   HEENT: NC/AT, EOMI, clear conjunctiva, nml external ear and nose   Cardio: deferred  Pulm: no acute respiratory distress, able to talk in complete sentences w/o getting short of breath   Abd: deferred   Psych: nml mood/affect/behavior     VIRTUAL VISIT [address] acknowledges that she has consented to an online visit or consultation   She understands that the online visit is based solely on information provided by her, and that, in the absence of a face-to-face physical evaluation by the physician, the diagnosis she receives is both limited and provisional in terms of accuracy and completeness  This is not intended to replace a full medical face-to-face evaluation by the physician  Michael Schuler understands and accepts these terms

## 2021-04-11 LAB — SARS-COV-2 RNA RESP QL NAA+PROBE: NEGATIVE

## 2021-04-12 ENCOUNTER — TELEPHONE (OUTPATIENT)
Dept: FAMILY MEDICINE CLINIC | Facility: CLINIC | Age: 31
End: 2021-04-12

## 2021-04-12 NOTE — TELEPHONE ENCOUNTER
----- Message from [facility] sent at 4/12/2021  8:11 AM EDT -----  Please make sure patient is aware of negative COVID

## 2021-04-19 ENCOUNTER — OFFICE VISIT (OUTPATIENT)
Dept: URGENT CARE | Facility: CLINIC | Age: 31
End: 2021-04-19
Payer: COMMERCIAL

## 2021-04-19 ENCOUNTER — APPOINTMENT (OUTPATIENT)
Dept: RADIOLOGY | Facility: CLINIC | Age: 31
End: 2021-04-19
Payer: COMMERCIAL

## 2021-04-19 VITALS
HEART RATE: 64 BPM | SYSTOLIC BLOOD PRESSURE: 135 MMHG | DIASTOLIC BLOOD PRESSURE: 78 MMHG | RESPIRATION RATE: 18 BRPM | TEMPERATURE: 97.9 F | HEIGHT: 65 IN | BODY MASS INDEX: 40.98 KG/M2 | WEIGHT: 246 LBS | OXYGEN SATURATION: 100 %

## 2021-04-19 DIAGNOSIS — M79.632 LEFT FOREARM PAIN: Primary | ICD-10-CM

## 2021-04-19 DIAGNOSIS — M79.632 LEFT FOREARM PAIN: ICD-10-CM

## 2021-04-19 PROCEDURE — 73090 X-RAY EXAM OF FOREARM: CPT

## 2021-04-19 PROCEDURE — 99203 OFFICE O/P NEW LOW 30 MIN: CPT | Performed by: PHYSICIAN ASSISTANT

## 2021-04-19 PROCEDURE — 99283 EMERGENCY DEPT VISIT LOW MDM: CPT | Performed by: PHYSICIAN ASSISTANT

## 2021-04-19 PROCEDURE — G0382 LEV 3 HOSP TYPE B ED VISIT: HCPCS | Performed by: PHYSICIAN ASSISTANT

## 2021-04-19 NOTE — LETTER
April 19, 2021     Patient: Chastity Witt   YOB: 1990   Date of Visit: 4/19/2021       To Whom it May Concern:    Steph Trejo was seen in my clinic on 4/19/2021  She  May return to work on 4/20/2021    If you have any questions or concerns, please don't hesitate to call           Sincerely,          Nikolas Valle PA-C

## 2021-04-19 NOTE — PATIENT INSTRUCTIONS
X-rays completed today shows no acute osseous abnormalities  Radiologist reading pending  You will be called with any positive findings  Continue to rest, ice  Ice 20 minutes on 20 minutes off   Use over the counter medication as needed to helps with symptoms  Follow up with PCP in 3-5 days for continued pain  Proceed to the ER with worsening of pain, loss of sensation distally, loss of motion  Contusion in Adults, Ambulatory Care   GENERAL INFORMATION:   A contusion  is a bruise that appears on your skin after an injury  A bruise happens when small blood vessels tear but skin does not  When blood vessels tear, blood leaks into nearby tissue, such as soft tissue or muscle  Common symptoms include the following:   · Pain that increases when you touch the bruise, walk, or use the area around the bruise    · Swelling or a lump at the site of the bruise or near it    · Red, blue, or black skin that may change to green or yellow after a few days    · Stiffness or problems moving the bruised area of your body  Seek immediate care for the following symptoms:   · New difficulty moving your injured area    · Tingling or numbness in or near the injured area    · Hand or foot below the bruise gets cold or turns pale  Treatment for a contusion  may include any of the following:  · NSAIDs  help decrease swelling and pain or fever  This medicine is available with or without a doctor's order  NSAIDs can cause stomach bleeding or kidney problems in certain people  If you take blood thinner medicine, always ask your healthcare provider if NSAIDs are safe for you  Always read the medicine label and follow directions  · Pain medicine  to decrease or take away pain  Do not wait until the pain is severe before you take your medicine  · Aspiration  to drain pooled blood in your muscle may be done to help prevent increased pressure in the muscle      · Surgery  may be done to repair a tear in the muscle or relieve pressure in the muscle caused by swelling  Care for a contusion:   · Rest the injured area  or use it less than usual  If you bruised your leg or foot, you may need crutches or a cane to help you walk  This will help you keep weight off your injured body part  Use crutches or a cane as directed  · Use ice  to decrease swelling and pain  Ice may also help prevent tissue damage  Use an ice pack, or put crushed ice in a plastic bag  Cover it with a towel and place it on your bruise for 15 to 20 minutes every hour or as directed  · Use Compression  An elastic bandage may be wrapped around a bruised muscle to support the area and decrease swelling  Make sure the bandage is not too tight  You should be able to fit 1 finger between the bandage and your skin  · Elevate (raise) your injured body part  above the level of your heart to help decrease pain and swelling  Use pillows, blankets, or rolled towels to elevate the area as often as you can  · Do not massage or use heat  Heat and massage may slow healing of the area  · Do not drink alcohol  Alcohol may slow healing of your injury  · Do not stretch injured muscles  Ask your healthcare provider when and how you may safely stretch after your injury  Prevent a contusion:   · Stretch and warm up before you play sports or exercise  · Wear protective gear when you play sports  Examples are shin guards and padding  · If you begin a new physical activity, start slowly to give your body a chance to adjust   Follow up with your healthcare provider as directed:  Write down your questions so you remember to ask them during your visits  CARE AGREEMENT:   You have the right to help plan your care  Learn about your health condition and how it may be treated  Discuss treatment options with your caregivers to decide what care you want to receive  You always have the right to refuse treatment  The above information is an  only   It is not intended as medical advice for individual conditions or treatments  Talk to your doctor, nurse or pharmacist before following any medical regimen to see if it is safe and effective for you  © 2014 [address] is for End User's use only and may not be sold, redistributed or otherwise used for commercial purposes  All illustrations and images included in CareNotes® are the copyrighted property of A D A M , Inc  or [facility]
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
## 2021-05-11 LAB — TSH SERPL-ACNC: 0.97 MIU/L

## 2021-05-12 ENCOUNTER — OFFICE VISIT (OUTPATIENT)
Dept: FAMILY MEDICINE CLINIC | Facility: CLINIC | Age: 31
End: 2021-05-12
Payer: COMMERCIAL

## 2021-05-12 VITALS
OXYGEN SATURATION: 98 % | HEIGHT: 65 IN | SYSTOLIC BLOOD PRESSURE: 130 MMHG | DIASTOLIC BLOOD PRESSURE: 66 MMHG | BODY MASS INDEX: 40.48 KG/M2 | HEART RATE: 85 BPM | RESPIRATION RATE: 16 BRPM | WEIGHT: 243 LBS

## 2021-05-12 DIAGNOSIS — F41.9 ANXIETY AND DEPRESSION: ICD-10-CM

## 2021-05-12 DIAGNOSIS — Z13.220 SCREENING FOR LIPID DISORDERS: ICD-10-CM

## 2021-05-12 DIAGNOSIS — Z00.00 ANNUAL PHYSICAL EXAM: Primary | ICD-10-CM

## 2021-05-12 DIAGNOSIS — Z13.0 SCREENING FOR DEFICIENCY ANEMIA: ICD-10-CM

## 2021-05-12 DIAGNOSIS — E03.9 ACQUIRED HYPOTHYROIDISM: ICD-10-CM

## 2021-05-12 DIAGNOSIS — Z12.4 CERVICAL CANCER SCREENING: ICD-10-CM

## 2021-05-12 DIAGNOSIS — Z13.1 SCREENING FOR DIABETES MELLITUS: ICD-10-CM

## 2021-05-12 DIAGNOSIS — F32.A ANXIETY AND DEPRESSION: ICD-10-CM

## 2021-05-12 PROCEDURE — 3725F SCREEN DEPRESSION PERFORMED: CPT | Performed by: FAMILY MEDICINE

## 2021-05-12 PROCEDURE — 99395 PREV VISIT EST AGE 18-39: CPT | Performed by: FAMILY MEDICINE

## 2021-05-12 PROCEDURE — 99214 OFFICE O/P EST MOD 30 MIN: CPT | Performed by: FAMILY MEDICINE

## 2021-05-12 RX ORDER — LEVOTHYROXINE SODIUM 137 UG/1
137 TABLET ORAL DAILY
Qty: 90 TABLET | Refills: 0 | Status: SHIPPED | OUTPATIENT
Start: 2021-05-12 | End: 2021-06-02

## 2021-05-12 NOTE — PATIENT INSTRUCTIONS
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
## 2021-05-28 ENCOUNTER — TELEPHONE (OUTPATIENT)
Dept: FAMILY MEDICINE CLINIC | Facility: CLINIC | Age: 31
End: 2021-05-28

## 2021-05-28 DIAGNOSIS — F41.1 ANXIOUS REACTION: Primary | ICD-10-CM

## 2021-05-28 NOTE — TELEPHONE ENCOUNTER
Patient called stated she saw doctor a couple weeks ago   she states the thyroid medication is making her dizzy and sick to her stomach, anxious and ezequiel  Wants to know if you can send a script for thyroid labs to be done again sometime in June

## 2021-06-02 ENCOUNTER — OFFICE VISIT (OUTPATIENT)
Dept: FAMILY MEDICINE CLINIC | Facility: CLINIC | Age: 31
End: 2021-06-02
Payer: COMMERCIAL

## 2021-06-02 VITALS
BODY MASS INDEX: 41.32 KG/M2 | DIASTOLIC BLOOD PRESSURE: 76 MMHG | OXYGEN SATURATION: 99 % | HEART RATE: 74 BPM | SYSTOLIC BLOOD PRESSURE: 118 MMHG | HEIGHT: 65 IN | RESPIRATION RATE: 16 BRPM | WEIGHT: 248 LBS

## 2021-06-02 DIAGNOSIS — F41.9 ANXIETY AND DEPRESSION: ICD-10-CM

## 2021-06-02 DIAGNOSIS — F32.A ANXIETY AND DEPRESSION: ICD-10-CM

## 2021-06-02 DIAGNOSIS — E03.9 ACQUIRED HYPOTHYROIDISM: Primary | ICD-10-CM

## 2021-06-02 DIAGNOSIS — Z12.4 CERVICAL CANCER SCREENING: ICD-10-CM

## 2021-06-02 PROCEDURE — 1036F TOBACCO NON-USER: CPT | Performed by: FAMILY MEDICINE

## 2021-06-02 PROCEDURE — 3008F BODY MASS INDEX DOCD: CPT | Performed by: FAMILY MEDICINE

## 2021-06-02 PROCEDURE — 99214 OFFICE O/P EST MOD 30 MIN: CPT | Performed by: FAMILY MEDICINE

## 2021-06-02 RX ORDER — LEVOTHYROXINE SODIUM 0.12 MG/1
137 TABLET ORAL DAILY
Qty: 60 TABLET | Refills: 0 | Status: SHIPPED | OUTPATIENT
Start: 2021-06-02 | End: 2021-06-30
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
## 2021-06-28 ENCOUNTER — OFFICE VISIT (OUTPATIENT)
Dept: FAMILY MEDICINE CLINIC | Facility: CLINIC | Age: 31
End: 2021-06-28
Payer: COMMERCIAL

## 2021-06-28 VITALS
RESPIRATION RATE: 18 BRPM | HEIGHT: 65 IN | HEART RATE: 76 BPM | BODY MASS INDEX: 41.25 KG/M2 | WEIGHT: 247.6 LBS | SYSTOLIC BLOOD PRESSURE: 124 MMHG | DIASTOLIC BLOOD PRESSURE: 76 MMHG | OXYGEN SATURATION: 98 %

## 2021-06-28 DIAGNOSIS — M25.512 ACUTE PAIN OF LEFT SHOULDER: Primary | ICD-10-CM

## 2021-06-28 LAB — TSH SERPL-ACNC: 0.54 MIU/L

## 2021-06-28 PROCEDURE — 99213 OFFICE O/P EST LOW 20 MIN: CPT | Performed by: NURSE PRACTITIONER

## 2021-06-28 PROCEDURE — 1036F TOBACCO NON-USER: CPT | Performed by: NURSE PRACTITIONER

## 2021-06-28 PROCEDURE — 3008F BODY MASS INDEX DOCD: CPT | Performed by: NURSE PRACTITIONER

## 2021-06-28 PROCEDURE — 3725F SCREEN DEPRESSION PERFORMED: CPT | Performed by: NURSE PRACTITIONER

## 2021-07-21 ENCOUNTER — OFFICE VISIT (OUTPATIENT)
Dept: FAMILY MEDICINE CLINIC | Facility: CLINIC | Age: 31
End: 2021-07-21
Payer: COMMERCIAL

## 2021-07-21 VITALS
OXYGEN SATURATION: 98 % | HEART RATE: 71 BPM | WEIGHT: 243 LBS | HEIGHT: 65 IN | DIASTOLIC BLOOD PRESSURE: 78 MMHG | RESPIRATION RATE: 16 BRPM | SYSTOLIC BLOOD PRESSURE: 120 MMHG | BODY MASS INDEX: 40.48 KG/M2 | TEMPERATURE: 99.3 F

## 2021-07-21 DIAGNOSIS — F32.A ANXIETY AND DEPRESSION: ICD-10-CM

## 2021-07-21 DIAGNOSIS — F41.9 ANXIETY AND DEPRESSION: ICD-10-CM

## 2021-07-21 DIAGNOSIS — E03.9 ACQUIRED HYPOTHYROIDISM: Primary | ICD-10-CM

## 2021-07-21 PROCEDURE — 1036F TOBACCO NON-USER: CPT | Performed by: FAMILY MEDICINE

## 2021-07-21 PROCEDURE — 99214 OFFICE O/P EST MOD 30 MIN: CPT | Performed by: FAMILY MEDICINE

## 2021-07-21 PROCEDURE — 3008F BODY MASS INDEX DOCD: CPT | Performed by: FAMILY MEDICINE

## 2021-07-21 RX ORDER — LEVOTHYROXINE SODIUM 0.1 MG/1
100 TABLET ORAL DAILY
Qty: 60 TABLET | Refills: 0 | Status: SHIPPED | OUTPATIENT
Start: 2021-07-21 | End: 2021-08-18
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
## 2021-08-11 ENCOUNTER — CONSULT (OUTPATIENT)
Dept: BARIATRICS | Facility: CLINIC | Age: 31
End: 2021-08-11
Payer: COMMERCIAL

## 2021-08-11 VITALS
HEIGHT: 66 IN | TEMPERATURE: 99.7 F | BODY MASS INDEX: 38.83 KG/M2 | SYSTOLIC BLOOD PRESSURE: 114 MMHG | HEART RATE: 88 BPM | WEIGHT: 241.6 LBS | DIASTOLIC BLOOD PRESSURE: 66 MMHG

## 2021-08-11 DIAGNOSIS — E66.9 CLASS 2 OBESITY WITH BODY MASS INDEX (BMI) OF 36.0 TO 36.9 IN ADULT: ICD-10-CM

## 2021-08-11 DIAGNOSIS — F32.A DEPRESSION: Primary | ICD-10-CM

## 2021-08-11 DIAGNOSIS — F41.9 ANXIETY: ICD-10-CM

## 2021-08-11 DIAGNOSIS — E03.9 ACQUIRED HYPOTHYROIDISM: ICD-10-CM

## 2021-08-11 PROCEDURE — 99214 OFFICE O/P EST MOD 30 MIN: CPT | Performed by: FAMILY MEDICINE

## 2021-08-11 NOTE — PROGRESS NOTES
Assessment/Plan:    No problem-specific Assessment & Plan notes found for this encounter  Diagnoses and all orders for this visit:    Depression  -     ECG 12 lead; Future    BMI 40 0-44 9, adult ([address] )  -     Ambulatory referral to Weight Management  -     Hemoglobin A1C; Future  -     ECG 12 lead; Future    Anxiety  -     ECG 12 lead; Future    Acquired hypothyroidism  -     Hemoglobin A1C; Future  -     ECG 12 lead; Future    Class 2 obesity with body mass index (BMI) of 36 0 to 36 9 in adult  -     Hemoglobin A1C; Future  -     ECG 12 lead; Future        -Patient is pursuing Conservative Program  -Initial weight loss goal of 5-10% weight loss for improved health  -Screening labs  - interested in meal planning with RD  - food log and bring next visit  - not interested in AOMs  - working on behavioral components and planning to see a therapist     - get labs and EKG    Initial: 238 4lbs  Current: 241 6lbs  Change: + 3 2lbs  Goal:160-180lbs     Goals:  Food log (ie ) www myfitnesspal com,sparkpeople  com,loseit com,calorieking  com,etc  baritastic  No sugary beverages  At least 64oz of water daily  Increase physical activity by 10 minutes daily  Gradually increase physical activity to a goal of 5 days per week for 30 minutes of MODERATE intensity PLUS 2 days per week of FULL BODY resistance training  Nutrition Prescription  Calories:2498-2582 calories sedentary and flex to 5594-2309 calories on cardio days  Protein:70-90gm  Fluid:70oz    Follow up in approximately 2 weeks with Non-Surgical Dietician  Subjective:   Chief Complaint   Patient presents with    Consult     mwm consult       Patient ID: Devora Pryor  is a 36 y o  female with excess weight/obesity here to pursue weight management  Patient is pursuing Conservative Program      HPI     OD follow up  Last seen 8/29/2019    She tried in the past phentermine but had SE of jittery and hyper/ palpitations       She is not interested in medications as she feels worried about side effects  Currently having issues of GI side effects with levothyroxine, TSH is stable at current dose  The following portions of the patient's history were reviewed and updated as appropriate: allergies, current medications, past family history, past medical history, past social history, past surgical history and problem list     Review of Systems   Constitutional: Negative for activity change and appetite change  Respiratory: Negative  Cardiovascular: Negative  Gastrointestinal: Negative  Genitourinary: Negative  Musculoskeletal: Negative for arthralgias  Skin: Negative for rash  Psychiatric/Behavioral: Negative  Objective:    /66 (BP Location: Left arm, Patient Position: Sitting, Cuff Size: Large)   Pulse 88   Temp 99 7 °F (37 6 °C)   Ht 5' 6 3" (1 684 m)   Wt 110 kg (241 lb 9 6 oz)   BMI 38 64 kg/m²      Physical Exam     Constitutional   General appearance: Abnormal   well developed and obese  Eyes No conjunctival pallor     Musculoskeletal   Gait and station: Normal     Psychiatric   Orientation to person, place and time: Normal     Affect: appropriate

## 2021-08-16 LAB
ALBUMIN SERPL-MCNC: 4.4 G/DL (ref 3.6–5.1)
ALBUMIN/GLOB SERPL: 1.8 (CALC) (ref 1–2.5)
ALP SERPL-CCNC: 60 U/L (ref 31–125)
ALT SERPL-CCNC: 11 U/L (ref 6–29)
AST SERPL-CCNC: 15 U/L (ref 10–30)
BASOPHILS # BLD AUTO: 78 CELLS/UL (ref 0–200)
BASOPHILS NFR BLD AUTO: 1.6 %
BILIRUB SERPL-MCNC: 0.5 MG/DL (ref 0.2–1.2)
BUN SERPL-MCNC: 10 MG/DL (ref 7–25)
BUN/CREAT SERPL: NORMAL (CALC) (ref 6–22)
CALCIUM SERPL-MCNC: 9.4 MG/DL (ref 8.6–10.2)
CHLORIDE SERPL-SCNC: 105 MMOL/L (ref 98–110)
CHOLEST SERPL-MCNC: 209 MG/DL
CHOLEST/HDLC SERPL: 2.9 (CALC)
CO2 SERPL-SCNC: 25 MMOL/L (ref 20–32)
CREAT SERPL-MCNC: 0.7 MG/DL (ref 0.5–1.1)
EOSINOPHIL # BLD AUTO: 132 CELLS/UL (ref 15–500)
EOSINOPHIL NFR BLD AUTO: 2.7 %
ERYTHROCYTE [DISTWIDTH] IN BLOOD BY AUTOMATED COUNT: 12.2 % (ref 11–15)
GLOBULIN SER CALC-MCNC: 2.5 G/DL (CALC) (ref 1.9–3.7)
GLUCOSE SERPL-MCNC: 84 MG/DL (ref 65–99)
HBA1C MFR BLD: 4.8 % OF TOTAL HGB
HCT VFR BLD AUTO: 38.6 % (ref 35–45)
HDLC SERPL-MCNC: 71 MG/DL
HGB BLD-MCNC: 12.9 G/DL (ref 11.7–15.5)
LDLC SERPL CALC-MCNC: 122 MG/DL (CALC)
LYMPHOCYTES # BLD AUTO: 1617 CELLS/UL (ref 850–3900)
LYMPHOCYTES NFR BLD AUTO: 33 %
MCH RBC QN AUTO: 30.6 PG (ref 27–33)
MCHC RBC AUTO-ENTMCNC: 33.4 G/DL (ref 32–36)
MCV RBC AUTO: 91.7 FL (ref 80–100)
MONOCYTES # BLD AUTO: 372 CELLS/UL (ref 200–950)
MONOCYTES NFR BLD AUTO: 7.6 %
NEUTROPHILS # BLD AUTO: 2700 CELLS/UL (ref 1500–7800)
NEUTROPHILS NFR BLD AUTO: 55.1 %
NONHDLC SERPL-MCNC: 138 MG/DL (CALC)
PLATELET # BLD AUTO: 254 THOUSAND/UL (ref 140–400)
PMV BLD REES-ECKER: 12.4 FL (ref 7.5–12.5)
POTASSIUM SERPL-SCNC: 4.4 MMOL/L (ref 3.5–5.3)
PROT SERPL-MCNC: 6.9 G/DL (ref 6.1–8.1)
RBC # BLD AUTO: 4.21 MILLION/UL (ref 3.8–5.1)
SL AMB EGFR AFRICAN AMERICAN: 136 ML/MIN/1.73M2
SL AMB EGFR NON AFRICAN AMERICAN: 117 ML/MIN/1.73M2
SODIUM SERPL-SCNC: 137 MMOL/L (ref 135–146)
T4 FREE SERPL-MCNC: 1.1 NG/DL (ref 0.8–1.8)
TRIGL SERPL-MCNC: 70 MG/DL
TSH SERPL-ACNC: 4.7 MIU/L
WBC # BLD AUTO: 4.9 THOUSAND/UL (ref 3.8–10.8)

## 2021-08-18 ENCOUNTER — OFFICE VISIT (OUTPATIENT)
Dept: FAMILY MEDICINE CLINIC | Facility: CLINIC | Age: 31
End: 2021-08-18
Payer: COMMERCIAL

## 2021-08-18 VITALS
SYSTOLIC BLOOD PRESSURE: 124 MMHG | HEART RATE: 90 BPM | HEIGHT: 66 IN | WEIGHT: 242 LBS | BODY MASS INDEX: 38.89 KG/M2 | OXYGEN SATURATION: 98 % | DIASTOLIC BLOOD PRESSURE: 82 MMHG | RESPIRATION RATE: 16 BRPM

## 2021-08-18 DIAGNOSIS — E66.9 CLASS 2 OBESITY WITH BODY MASS INDEX (BMI) OF 38.0 TO 38.9 IN ADULT, UNSPECIFIED OBESITY TYPE, UNSPECIFIED WHETHER SERIOUS COMORBIDITY PRESENT: ICD-10-CM

## 2021-08-18 DIAGNOSIS — F32.A ANXIETY AND DEPRESSION: ICD-10-CM

## 2021-08-18 DIAGNOSIS — Z12.4 CERVICAL CANCER SCREENING: ICD-10-CM

## 2021-08-18 DIAGNOSIS — F41.9 ANXIETY AND DEPRESSION: ICD-10-CM

## 2021-08-18 DIAGNOSIS — E03.9 ACQUIRED HYPOTHYROIDISM: Primary | ICD-10-CM

## 2021-08-18 PROCEDURE — 99214 OFFICE O/P EST MOD 30 MIN: CPT | Performed by: FAMILY MEDICINE

## 2021-08-18 RX ORDER — LEVOTHYROXINE SODIUM 112 UG/1
112 TABLET ORAL DAILY
Qty: 60 TABLET | Refills: 0 | Status: SHIPPED | OUTPATIENT
Start: 2021-08-18 | End: 2021-12-22

## 2021-08-18 NOTE — PROGRESS NOTES
Assessment/Plan:   Diagnoses and all orders for this visit:    Acquired hypothyroidism  -     levothyroxine 112 mcg tablet; Take 1 tablet (112 mcg total) by mouth daily  -     TSH, 3rd generation with Free T4 reflex; Future  - currently on Levo 100mcg QAM (was decreased from 125mcg QAM) - symptomatic for hypothyroidism   - repeat TSH 4 70 <-- 0 54  - will increase to Levothyroxine 112mcg QAM   - pt has an appt scheduled with Endo for 9/29/2021  - pending repeat labs - t/c alternating Levothyroxine 100mcg and 112mcg   - RTO in 2months for f/u     Anxiety and depression  - denies SI/HI  - has an appt scheduled with Therapist on 8/30/2021  - declined referral to Psych and declined medications (states medications made her s/s worse)   - RTO in 2months for f/u     Class 2 obesity with body mass index (BMI) of 38 0 to 38 9 in adult, unspecified obesity type, unspecified whether serious comorbidity present  - BMI 38 7  - has re-started following with Bariatrics - last OV was 8/11/2021   - labs and EKG pending     Cervical cancer screening  -     Ambulatory referral to Gynecology; Future          Subjective:    Patient ID: Luis Conway is a 36 y o  female    HPI   36yo F presents to the office for f/u   - has been feeling a little "off" for the past few days - (+) tired, foggy, dry skin, thinning hair   - (+) PMHx of IBS - (+) intermittent diarrhea and constipation   - currently on Levo 100mcg QAM (was decreased from 125mcg QAM)   - repeat TSH 4 70 <-- 0 54  - [facility]  - has an appt scheduled with Therapist on 8/30/2021  - declined referral to Psych and declined starting on medication for anxiety/depression         The following portions of the patient's history were reviewed and updated as appropriate: allergies, current medications, past family history, past medical history, past social history, past surgical history and problem list     Review of Systems  as per HPI    Objective:  /82 (BP Location: Left arm, Patient Position: Sitting, Cuff Size: Large)   Pulse 90   Resp 16   Ht 5' 6 3" (1 684 m)   Wt 110 kg (242 lb)   SpO2 98%   BMI 38 71 kg/m²    Physical Exam  Vitals reviewed  Constitutional:       General: She is not in acute distress  Appearance: She is obese  She is not ill-appearing, toxic-appearing or diaphoretic  HENT:      Head: Normocephalic and atraumatic  Right Ear: External ear normal       Left Ear: External ear normal    Eyes:      General: No scleral icterus  Right eye: No discharge  Left eye: No discharge  Extraocular Movements: Extraocular movements intact  Conjunctiva/sclera: Conjunctivae normal    Cardiovascular:      Rate and Rhythm: Normal rate and regular rhythm  Heart sounds: Normal heart sounds  No murmur heard  No friction rub  No gallop  Pulmonary:      Effort: Pulmonary effort is normal  No respiratory distress  Breath sounds: Normal breath sounds  No stridor  No wheezing, rhonchi or rales  Abdominal:      General: Bowel sounds are normal       Palpations: Abdomen is soft  Musculoskeletal:         General: Normal range of motion  Cervical back: Normal range of motion and neck supple  Skin:     General: Skin is warm  Neurological:      General: No focal deficit present  Mental Status: She is alert and oriented to person, place, and time  Psychiatric:         Attention and Perception: Attention normal          Mood and Affect: Mood is anxious and depressed  Speech: Speech normal          Behavior: Behavior normal  Behavior is cooperative  Thought Content: Thought content normal  Thought content does not include homicidal or suicidal ideation  Thought content does not include homicidal or suicidal plan  Cognition and Memory: Cognition normal          Judgment: Judgment normal        BMI Counseling: Body mass index is 38 71 kg/m²   The BMI is above normal  Nutrition recommendations include reducing portion sizes and decreasing overall calorie intake  Exercise recommendations include moderate aerobic physical activity for 150 minutes/week, exercising 3-5 times per week, joining a gym and strength training exercises  Depression Screening Follow-up Plan: Patient's depression screening was positive with a PHQ-2 score of   Their PHQ-9 score was   Patient declines further evaluation by mental health professional and/or medications  They have no active suicidal ideations  Brief counseling provided and recommend additional follow-up/re-evaluation at next office visit

## 2021-08-30 ENCOUNTER — TELEPHONE (OUTPATIENT)
Dept: FAMILY MEDICINE CLINIC | Facility: CLINIC | Age: 31
End: 2021-08-30

## 2021-08-30 NOTE — LETTER
Date: 9/2/2021    To whom it may concern: This is to certify that Kristin Hayes has been under my care for the following diagnosis: Anxiety, Post-Traumatic Stress Disorder, IBS/        I feel that she is unable to serve on Jury Duty at this time for the above mentioned medical reasons                    Sincerely,         Fidel Pappas

## 2021-08-30 NOTE — TELEPHONE ENCOUNTER
Patient called to ask Dr Bansal Apo for a letter for Andreas Alvarez her because of her anxiety and PTSD    Please advise

## 2021-09-07 NOTE — PROGRESS NOTES
Weight Management Medical Nutrition Assessment  Roxi Tavares presented for a meal planning session  Today's weight is 242 3#  Per dietary recall patient consumes excess calories from larger portions at mealtimes  Patient stated she will often skip meals and snack throughout her day  She stated she struggles with meal planning and knows her caloric intake is inconsistent  Developed and reviewed a low calorie meal plan  Patient seen by Medical Provider in past 6 months:  yes  Requested to schedule appointment with Medical Provider: No      Anthropometric Measurements  Start Weight (#): 242 3#  Current Weight (#): n/a  TBW % Change from start weight:n/a  Ideal Body Weight (#):130#  Goal Weight (#):ST# LT#    Weight Loss History  Previous weight loss attempts: Self Created Diets (Portion Control, Healthy Food Choices, etc )    Food and Nutrition Related History    Work   Food Recall  Breakfast: 2 whole grain toast with sun butter or nutela/ cereal - unmeasured    Snack:skip  Lunch:whole wheat sandwich / chicken- peppers and humas  Snack:yogurt   Dinner:chicken / carb/ veggies- unmeasured  Snack:skip      Beverages: water, coffee and 1/2 1/2 / sugar   Volume of beverage intake: 70oz    Weekends: Same  Cravings: none reported  Trouble area of day:between meal snacking     Frequency of Eating out: irregularly  Food restrictions: self-reported : strawberries / almonds  Cooking: self   Food Shopping: self    Physical Activity Intake  Activity:Walking ( 30min)  Frequency:infrequently  Physical limitations/barriers to exercise: none reported     Estimated Needs  Energy  [address] Energy Needs: BMR : 1841 calories   1-2# loss weekly sedentary:  3578-8441 calories             1-2# loss weekly lightly active:1055-7221 calories  Maintenance calories for sedentary activity level: 2209 calories  Protein:70-89gm      (1 2-1 5g/kg IBW)  Fluid: 69oz     (35mL/kg IBW)    Nutrition Diagnosis  Yes;     Overweight/obesity related to Excess energy intake as evidenced by  BMI more than normative standard for age and sex (obesity-grade II 35-39  9)       Nutrition Intervention    Nutrition Prescription  Calories:1200 -1400 calories on sedentary days and flex to 1600 calories on cardio days  Protein:70-90gm  Fluid:70oz    Meal Plan (Chi/Pro/Carb)  Breakfast: 200-300/20/30  Snack:  Lunch: 300/30/30-45  Snack: 150/>5/20  Dinner: 300/30/30-45  Snack: 150/>5/20        Nutrition Education:    Healthy Core Manual  Calorie controlled menu  Lean protein food choices  Healthy snack options  Food journaling tips      Nutrition Counseling:  Strategies: meal planning, portion sizes, healthy snack choices, hydration, fiber intake, protein intake, exercise, food journal      Monitoring and Evaluation:  Evaluation criteria:  Energy Intake  Meet protein needs  Maintain adequate hydration  Monitor weekly weight  Meal planning/preparation  Food journal   Decreased portions at mealtimes and snacks  Physical activity     Barriers to learning:none  Readiness to change: Preparation:  (Getting ready to change)   Comprehension: good  Expected Compliance: good

## 2021-09-08 ENCOUNTER — OFFICE VISIT (OUTPATIENT)
Dept: BARIATRICS | Facility: CLINIC | Age: 31
End: 2021-09-08

## 2021-09-08 VITALS — HEIGHT: 66 IN | WEIGHT: 242.3 LBS | BODY MASS INDEX: 38.94 KG/M2

## 2021-09-08 DIAGNOSIS — R63.5 ABNORMAL WEIGHT GAIN: ICD-10-CM

## 2021-09-08 PROCEDURE — RECHECK: Performed by: DIETITIAN, REGISTERED

## 2021-09-08 PROCEDURE — WMDI30: Performed by: DIETITIAN, REGISTERED

## 2021-09-20 LAB — TSH SERPL-ACNC: 2.9 MIU/L

## 2021-09-22 ENCOUNTER — TELEPHONE (OUTPATIENT)
Dept: FAMILY MEDICINE CLINIC | Facility: CLINIC | Age: 31
End: 2021-09-22

## 2021-09-22 NOTE — TELEPHONE ENCOUNTER
----- Message from Fidel Pappas DO sent at 9/22/2021  8:34 AM EDT -----  TSH within range - cont Levothyroxine 112mcg QAM

## 2021-10-06 ENCOUNTER — OFFICE VISIT (OUTPATIENT)
Dept: FAMILY MEDICINE CLINIC | Facility: CLINIC | Age: 31
End: 2021-10-06
Payer: COMMERCIAL

## 2021-10-06 VITALS
RESPIRATION RATE: 16 BRPM | SYSTOLIC BLOOD PRESSURE: 124 MMHG | WEIGHT: 244 LBS | HEART RATE: 81 BPM | BODY MASS INDEX: 39.21 KG/M2 | HEIGHT: 66 IN | OXYGEN SATURATION: 98 % | DIASTOLIC BLOOD PRESSURE: 76 MMHG

## 2021-10-06 DIAGNOSIS — Z12.4 CERVICAL CANCER SCREENING: ICD-10-CM

## 2021-10-06 DIAGNOSIS — E03.9 ACQUIRED HYPOTHYROIDISM: Primary | ICD-10-CM

## 2021-10-06 DIAGNOSIS — F41.9 ANXIETY AND DEPRESSION: ICD-10-CM

## 2021-10-06 DIAGNOSIS — F32.A ANXIETY AND DEPRESSION: ICD-10-CM

## 2021-10-06 PROCEDURE — 99213 OFFICE O/P EST LOW 20 MIN: CPT | Performed by: FAMILY MEDICINE

## 2021-12-22 ENCOUNTER — OFFICE VISIT (OUTPATIENT)
Dept: FAMILY MEDICINE CLINIC | Facility: CLINIC | Age: 31
End: 2021-12-22
Payer: COMMERCIAL

## 2021-12-22 VITALS
DIASTOLIC BLOOD PRESSURE: 80 MMHG | WEIGHT: 256.4 LBS | OXYGEN SATURATION: 97 % | RESPIRATION RATE: 18 BRPM | BODY MASS INDEX: 41.21 KG/M2 | HEART RATE: 82 BPM | HEIGHT: 66 IN | SYSTOLIC BLOOD PRESSURE: 122 MMHG

## 2021-12-22 DIAGNOSIS — H53.9 CHANGE IN VISION: ICD-10-CM

## 2021-12-22 DIAGNOSIS — F32.A ANXIETY AND DEPRESSION: ICD-10-CM

## 2021-12-22 DIAGNOSIS — F41.9 ANXIETY AND DEPRESSION: ICD-10-CM

## 2021-12-22 DIAGNOSIS — E03.9 ACQUIRED HYPOTHYROIDISM: Primary | ICD-10-CM

## 2021-12-22 PROCEDURE — 99214 OFFICE O/P EST MOD 30 MIN: CPT | Performed by: FAMILY MEDICINE

## 2021-12-22 RX ORDER — LEVOTHYROXINE SODIUM 0.12 MG/1
125 TABLET ORAL DAILY
COMMUNITY
Start: 2021-12-22

## 2022-01-05 ENCOUNTER — OFFICE VISIT (OUTPATIENT)
Dept: OBGYN CLINIC | Facility: CLINIC | Age: 32
End: 2022-01-05
Payer: COMMERCIAL

## 2022-01-05 VITALS
DIASTOLIC BLOOD PRESSURE: 80 MMHG | HEIGHT: 66 IN | SYSTOLIC BLOOD PRESSURE: 130 MMHG | BODY MASS INDEX: 40.02 KG/M2 | WEIGHT: 249 LBS

## 2022-01-05 DIAGNOSIS — Z12.4 CERVICAL CANCER SCREENING: ICD-10-CM

## 2022-01-05 DIAGNOSIS — Z01.419 WELL FEMALE EXAM WITH ROUTINE GYNECOLOGICAL EXAM: Primary | ICD-10-CM

## 2022-01-05 DIAGNOSIS — Z12.4 ENCOUNTER FOR SCREENING FOR MALIGNANT NEOPLASM OF CERVIX: ICD-10-CM

## 2022-01-05 PROCEDURE — 99395 PREV VISIT EST AGE 18-39: CPT | Performed by: OBSTETRICS & GYNECOLOGY

## 2022-01-05 PROCEDURE — 0503F POSTPARTUM CARE VISIT: CPT | Performed by: OBSTETRICS & GYNECOLOGY

## 2022-01-05 PROCEDURE — G0145 SCR C/V CYTO,THINLAYER,RESCR: HCPCS | Performed by: OBSTETRICS & GYNECOLOGY

## 2022-01-05 NOTE — PATIENT INSTRUCTIONS
If interested in Gardasil vaccination, please contact the office to schedule an appointment  Here is some information regarding vaccine  [email or web address]  pdf

## 2022-01-05 NOTE — PROGRESS NOTES
ASSESSMENT & PLAN:     Diagnoses and all orders for this visit:    Well female exam with routine gynecological exam  Encounter for screening for malignant neoplasm of cervix  -     Liquid-based pap, screening      The following were reviewed in today's visit: ASCCP guidelines, Gardisil vaccination, STD testing breast self exam, mammography screening ordered, use and side effects of OCPs, exercise and healthy diet  Patient to call if interested in Gardisil vaccination or birth control for cycle regulation  Patient to return to office in yearly for annual exam      All questions have been answered to her satisfaction  CC:  Annual Gynecologic Examination    HPI: Bashir Field is a 36 y o  Peggi Nico who presents for annual gynecologic examination  She has the following concerns:      Having issues with her thyroid meds  She had a short, light period in December, and end of January was her next period that was closer to normal  She does get a period every 21 days or so  Her period can be heavy at times  Her period changed with recent change in thyroid med (brand)  We discussed that she is cycling regularly, therefore her hormones are likely in order, but her cycles are a little on the shorter side  We discussed cycle control with birth control pills but patient is not interested at this time  Health Maintenance:    She exercises 3 days per week  She wears her seatbelt routinely  She is practicing breast self awareness  Patient does try to follow a balanced diet        Past Medical History:   Diagnosis Date    Anxiety     Constipation     Depression     Diarrhea     GERD (gastroesophageal reflux disease)     Hypothyroidism     IBS (irritable bowel syndrome)     Nausea     Panic attacks     Rash     Varicella        Past Surgical History:   Procedure Laterality Date    WISDOM TOOTH EXTRACTION         Past OB/Gyn History:  Period Cycle (Days): 30  Period Duration (Days): 5-6  Period Pattern: Regular  Menstrual Flow: Heavy,Moderate  Menstrual Control: Panty liner,Thin pad  Dysmenorrhea: (!) Moderate  Dysmenorrhea Symptoms: Cramping,Nausea,HeadachePatient's last menstrual period was 2021  History of sexually transmitted infection No, is not interested in STD testing today  Patient is not currently sexually active     Birth control : no method  Gardisil Vaccination completed: no  Last Pap: first pap today    Family History  Family History   Problem Relation Age of Onset    Hypothyroidism Mother     Allergies Mother     Thyroid disease Mother     Diabetes Father     Hyperlipidemia Father     Hip dysplasia Sister     Seizures Sister     Depression Brother     Substance Abuse Brother         heroin    Stroke Paternal Grandmother     Skin cancer Paternal Grandfather     Prostate cancer Paternal Grandfather     Lung cancer Paternal Grandfather     Cancer Paternal Grandfather         Skin, Prostate, Brain,Lung    Osteoporosis Paternal Grandfather     Personality disorder Sister     Breast cancer Neg Hx     Colon cancer Neg Hx     Ovarian cancer Neg Hx     Uterine cancer Neg Hx     Cervical cancer Neg Hx        Social History:  Social History     Socioeconomic History    Marital status: Single     Spouse name: Not on file    Number of children: Not on file    Years of education: Not on file    Highest education level: Not on file   Occupational History    Not on file   Tobacco Use    Smoking status: Never Smoker    Smokeless tobacco: Never Used   Vaping Use    Vaping Use: Never used   Substance and Sexual Activity    Alcohol use: Not on file    Drug use: Never    Sexual activity: Never   Other Topics Concern    Not on file   Social History Narrative    Brother  in 2017      Social Determinants of Health     Financial Resource Strain: Not on file   Food Insecurity: Not on file   Transportation Needs: Not on file   Physical Activity: Not on file   Stress: Not on file   Social Connections: Not on file   Intimate Partner Violence: Not on file   Housing Stability: Not on file     Presently lives with parents, sister  She feels safe at home  Patient is currently not working  Allergies: Allergies   Allergen Reactions    Other Swelling     almonds    Strawberry C [Ascorbate - Food Allergy] GI Intolerance       Medications:    Current Outpatient Medications:     levothyroxine 125 mcg tablet, Take 125 mcg by mouth daily  , Disp: , Rfl:     Review of Systems:  Review of Systems   Constitutional: Negative for chills and fever  Respiratory: Negative for cough and shortness of breath  Cardiovascular: Negative for chest pain and palpitations  Gastrointestinal: Positive for abdominal distention (IBS ), abdominal pain, nausea and vomiting  Negative for blood in stool and constipation  Genitourinary: Positive for menstrual problem (see HPI) and urgency (occasional ? nerves)  Negative for difficulty urinating, dysuria, frequency, pelvic pain, vaginal bleeding, vaginal discharge and vaginal pain  Neurological: Positive for headaches (intermittent)  Physical Exam:  /80 (BP Location: Left arm, Patient Position: Sitting, Cuff Size: Extra-Large)   Ht 5' 6" (1 676 m)   Wt 113 kg (249 lb)   LMP 12/20/2021   BMI 40 19 kg/m²    Physical Exam  Constitutional:       General: She is awake  Appearance: Normal appearance  She is well-developed  Genitourinary:      Vulva and bladder normal       Right Labia: No rash, tenderness or lesions  Left Labia: No tenderness, lesions or rash  No vaginal discharge, erythema, tenderness or bleeding  Right Adnexa: not tender, not full and no mass present  Left Adnexa: not tender, not full and no mass present  Cervix is nulliparous  No cervical motion tenderness, discharge, lesion or polyp  Uterus is not enlarged, tender or irregular        No uterine mass detected  No urethral prolapse present  Bladder is not tender  Pelvic exam was performed with patient in the lithotomy position  Breasts:      Right: No inverted nipple, mass, nipple discharge, skin change, tenderness, axillary adenopathy or supraclavicular adenopathy  Left: No inverted nipple, mass, nipple discharge, skin change, tenderness, axillary adenopathy or supraclavicular adenopathy  HENT:      Head: Normocephalic and atraumatic  Cardiovascular:      Rate and Rhythm: Normal rate and regular rhythm  Heart sounds: Normal heart sounds  Pulmonary:      Effort: Pulmonary effort is normal  No tachypnea or respiratory distress  Breath sounds: Normal breath sounds  Abdominal:      General: Abdomen is flat  There is no distension  Palpations: Abdomen is soft  Tenderness: There is no abdominal tenderness  There is no guarding or rebound  Musculoskeletal:      Cervical back: Neck supple  Lymphadenopathy:      Upper Body:      Right upper body: No supraclavicular or axillary adenopathy  Left upper body: No supraclavicular or axillary adenopathy  Neurological:      General: No focal deficit present  Mental Status: She is alert and oriented to person, place, and time  Psychiatric:         Mood and Affect: Mood normal          Behavior: Behavior normal          Thought Content: Thought content normal          Judgment: Judgment normal    Vitals reviewed

## 2022-01-12 DIAGNOSIS — B34.9 VIRAL INFECTION, UNSPECIFIED: Primary | ICD-10-CM

## 2022-01-12 LAB
LAB AP GYN PRIMARY INTERPRETATION: NORMAL
Lab: NORMAL

## 2022-01-12 PROCEDURE — U0005 INFEC AGEN DETEC AMPLI PROBE: HCPCS | Performed by: FAMILY MEDICINE

## 2022-01-12 PROCEDURE — U0003 INFECTIOUS AGENT DETECTION BY NUCLEIC ACID (DNA OR RNA); SEVERE ACUTE RESPIRATORY SYNDROME CORONAVIRUS 2 (SARS-COV-2) (CORONAVIRUS DISEASE [COVID-19]), AMPLIFIED PROBE TECHNIQUE, MAKING USE OF HIGH THROUGHPUT TECHNOLOGIES AS DESCRIBED BY CMS-2020-01-R: HCPCS | Performed by: FAMILY MEDICINE

## 2022-04-06 ENCOUNTER — OFFICE VISIT (OUTPATIENT)
Dept: OBGYN CLINIC | Facility: CLINIC | Age: 32
End: 2022-04-06
Payer: COMMERCIAL

## 2022-04-06 VITALS
DIASTOLIC BLOOD PRESSURE: 62 MMHG | BODY MASS INDEX: 40.98 KG/M2 | HEIGHT: 66 IN | WEIGHT: 255 LBS | SYSTOLIC BLOOD PRESSURE: 114 MMHG

## 2022-04-06 DIAGNOSIS — N90.89 VULVAR SKIN TAG: Primary | ICD-10-CM

## 2022-04-06 PROCEDURE — 88305 TISSUE EXAM BY PATHOLOGIST: CPT | Performed by: PATHOLOGY

## 2022-04-06 PROCEDURE — 11420 EXC H-F-NK-SP B9+MARG 0.5/<: CPT | Performed by: OBSTETRICS & GYNECOLOGY

## 2022-04-06 PROCEDURE — 99213 OFFICE O/P EST LOW 20 MIN: CPT | Performed by: OBSTETRICS & GYNECOLOGY

## 2022-04-06 NOTE — PROGRESS NOTES
Assessment/Plan    Diagnoses and all orders for this visit:    Vulvar skin tag  -     Tissue Exam      - ibuprofen q6hrs x 24 hours  - apply pressure of area is bleeding  - we will update you with results when available    - RTO for annual exam         Subjective  Chief Complaint   Patient presents with    Mass     Pt has a vaginal lump on the outside of her vagina  She states it is painful  Rosa Olmedo is a 29 y o  female here for a problem visit  Patient is complaining of small lump on the outside of vaginal  It is a new growth that she noticed on Friday  It is tender/painful and bothers her  She is not sexually active  Patient Active Problem List   Diagnosis    Other malaise and fatigue    Class 2 obesity with body mass index (BMI) of 36 0 to 36 9 in adult    IBS (irritable bowel syndrome)    Hypothyroidism    Anxiety    Depression    Atypical mole    Acute pain of left shoulder         Gynecologic History  Patient's last menstrual period was 2022    Contraception: none  Last Pap: 2022 NILM    Obstetric History  OB History    Para Term  AB Living   0 0 0 0 0 0   SAB IAB Ectopic Multiple Live Births   0 0 0 0 0         Past Medical History:   Diagnosis Date    Anxiety     Constipation     Depression     Diarrhea     GERD (gastroesophageal reflux disease)     Hypothyroidism     IBS (irritable bowel syndrome)     Nausea     Panic attacks     Rash     Varicella        Past Surgical History:   Procedure Laterality Date    WISDOM TOOTH EXTRACTION           Family History   Problem Relation Age of Onset    Hypothyroidism Mother    Salvador Flower Allergies Mother     Thyroid disease Mother     Diabetes Father     Hyperlipidemia Father     Hip dysplasia Sister     Seizures Sister     Depression Brother     Substance Abuse Brother         heroin    Stroke Paternal Grandmother     Skin cancer Paternal Grandfather     Prostate cancer Paternal Grandfather     Lung cancer Paternal Grandfather     Cancer Paternal Grandfather         Skin, Prostate, Brain,Lung    Osteoporosis Paternal Grandfather     Personality disorder Sister     Breast cancer Neg Hx     Colon cancer Neg Hx     Ovarian cancer Neg Hx     Uterine cancer Neg Hx     Cervical cancer Neg Hx        Social History     Socioeconomic History    Marital status: Single     Spouse name: Not on file    Number of children: Not on file    Years of education: Not on file    Highest education level: Not on file   Occupational History    Not on file   Tobacco Use    Smoking status: Never Smoker    Smokeless tobacco: Never Used   Vaping Use    Vaping Use: Never used   Substance and Sexual Activity    Alcohol use: Not on file    Drug use: Never    Sexual activity: Never   Other Topics Concern    Not on file   Social History Narrative    Brother  in 2017      Social Determinants of Health     Financial Resource Strain: Not on file   Food Insecurity: Not on file   Transportation Needs: Not on file   Physical Activity: Not on file   Stress: Not on file   Social Connections: Not on file   Intimate Partner Violence: Not on file   Housing Stability: Not on file       Allergies  Other and Strawberry c [ascorbate - food allergy]    Medications    Current Outpatient Medications:     levothyroxine 125 mcg tablet, Take 125 mcg by mouth daily  , Disp: , Rfl:       Review of Systems  Review of Systems   Constitutional: Negative for chills and fever  Genitourinary: Negative for vaginal bleeding and vaginal discharge  Objective     /62 (BP Location: Right arm, Patient Position: Sitting, Cuff Size: Large)   Ht 5' 6" (1 676 m)   Wt 116 kg (255 lb)   LMP 2022   BMI 41 16 kg/m²       Physical Exam  Constitutional:       General: She is not in acute distress  Appearance: Normal appearance  She is well-developed  She is not ill-appearing     Genitourinary:         Pulmonary:      Effort: Pulmonary effort is normal  No respiratory distress  Neurological:      General: No focal deficit present  Mental Status: She is alert and oriented to person, place, and time  Psychiatric:         Mood and Affect: Mood normal          Behavior: Behavior normal          Thought Content: Thought content normal          Judgment: Judgment normal    Vitals and nursing note reviewed  Skin excision    Date/Time: 4/6/2022 2:25 PM  Performed by: Moni Loaiza DO  Authorized by: Moni Loaiza DO   Universal Protocol:  Consent: Verbal consent obtained  Risks and benefits: risks, benefits and alternatives were discussed  Consent given by: patient  Time out: Immediately prior to procedure a "time out" was called to verify the correct patient, procedure, equipment, support staff and site/side marked as required  Patient understanding: patient states understanding of the procedure being performed  Patient identity confirmed: verbally with patient      Procedure Details - Skin Excision:     Number of Lesions:  1  Lesion 1:     Body area: Anogenital    Anogenital location:  Vulva       Final defect size (mm):  0 1    Malignancy: benign lesion       Repair Comments: Silver nitrate to base for hemostasis  Area infiltrated with 3ml Local prior to simple excision  Patient tolerated well

## 2022-05-04 ENCOUNTER — CONSULT (OUTPATIENT)
Dept: ENDOCRINOLOGY | Facility: CLINIC | Age: 32
End: 2022-05-04
Payer: COMMERCIAL

## 2022-05-04 VITALS
DIASTOLIC BLOOD PRESSURE: 68 MMHG | SYSTOLIC BLOOD PRESSURE: 128 MMHG | TEMPERATURE: 98.5 F | BODY MASS INDEX: 41.8 KG/M2 | WEIGHT: 259 LBS | HEART RATE: 95 BPM

## 2022-05-04 DIAGNOSIS — R53.82 CHRONIC FATIGUE: Primary | ICD-10-CM

## 2022-05-04 DIAGNOSIS — K58.9 IRRITABLE BOWEL SYNDROME, UNSPECIFIED TYPE: ICD-10-CM

## 2022-05-04 DIAGNOSIS — E55.9 VITAMIN D DEFICIENCY: ICD-10-CM

## 2022-05-04 DIAGNOSIS — K90.9 INTESTINAL MALABSORPTION, UNSPECIFIED TYPE: ICD-10-CM

## 2022-05-04 DIAGNOSIS — E03.9 ACQUIRED HYPOTHYROIDISM: ICD-10-CM

## 2022-05-04 PROCEDURE — 99244 OFF/OP CNSLTJ NEW/EST MOD 40: CPT | Performed by: INTERNAL MEDICINE

## 2022-05-04 NOTE — PROGRESS NOTES
Joby Brown [place] y o  female MRN: [id number]    Encounter: [id number]      Assessment/Plan     Assessment: This is a [place] -year[place] female with hypothyroidism  Plan:  Diagnoses and all orders for this visit:    Chronic fatigue  Will obtain iron panel, vitamin B12, vitamin-D and vitamin B6 level  -     Iron Panel (Includes Ferritin, Iron Sat%, Iron, and TIBC); Future    Acquired hypothyroidism  Levothyroxine dose was change in March 2022, as TSH was low  Will repeat TSH and free T4 now, based on the results will have to adjust the dose of levothyroxine  Also patient gives history of fluctuation in TSH as well as free T4, in last 2 years, sometimes levothyroxine making her sick to stomach as well as causing nausea  She would benefit from switching to brand Synthroid  Will send the prescription was the thyroid blood work result is back for brand Synthroid  Educated to take Synthroid on empty stomach 1 hour before breakfast and 4 hour apart from vitamin-D supplementation     -     Ambulatory referral to Endocrinology  -     T4, free; Future  -     TSH, 3rd generation; Future  -     T4, free; Future  -     TSH, 3rd generation; Future    Irritable bowel syndrome, unspecified type  Obtain following blood work, as it may cause malabsorption   -     Vitamin B12; Future  -     Vitamin D 25 hydroxy; Future  -     Vitamin B6; Future    Vitamin D deficiency  Obtain vitamin-D level  Discussed to start taking vitamin-D 3 supplementation 1000 International Units daily, last vitamin-D level was 30  CC:   Hypothyroidism    History of Present Illness     HPI:    Joby Shepard is 44-year-old woman with medical history of hypothyroidism, irritable bowel syndrome, is referred here for evaluation of hypothyroidism and management  She currently takes levothyroxine 125 mcg daily, she takes generic medication  She denies missing medication    She takes it on empty stomach 1 hour before breakfast   She complains of feeling nausea sometimes after taking levothyroxine  She is currently taking Mylan brand, since she was switched to Mylan brand her episodes of nausea has gone down  She has history of irritable bowel syndrome and also has intolerance to lactose, she tries to avoid lactose  Celiac profile was completed previously which was negative  Currently she is not taking vitamin-D supplementation, feels nauseous after taking vitamin-D, when she was using 26573 International Units once a week dose  Wt Readings from Last 3 Encounters:   05/04/22 117 kg (259 lb)   04/06/22 116 kg (255 lb)   01/05/22 113 kg (249 lb)       Review of Systems   Constitutional: Positive for fatigue and unexpected weight change  Negative for activity change, diaphoresis and fever  Hair loss   HENT: Negative  Eyes: Negative for visual disturbance  Respiratory: Negative for cough, chest tightness and shortness of breath  Cardiovascular: Positive for palpitations  Negative for chest pain and leg swelling  Gastrointestinal: Positive for constipation and diarrhea  Negative for abdominal pain, blood in stool, nausea and vomiting  Endocrine: Negative for cold intolerance, heat intolerance, polydipsia, polyphagia and polyuria  Genitourinary: Negative for dysuria, enuresis, frequency and urgency  Musculoskeletal: Negative for arthralgias and myalgias  Skin: Negative for pallor, rash and wound  Allergic/Immunologic: Negative  Neurological: Positive for tremors  Negative for dizziness, weakness and numbness  Hematological: Negative  Psychiatric/Behavioral: The patient is nervous/anxious  All other systems reviewed and are negative        Historical Information   Past Medical History:   Diagnosis Date    Anxiety     Constipation     Depression     Diarrhea     GERD (gastroesophageal reflux disease)     Hypothyroidism     IBS (irritable bowel syndrome)     Nausea     Panic attacks     Rash     Varicella Past Surgical History:   Procedure Laterality Date    WISDOM TOOTH EXTRACTION       Social History   Social History     Substance and Sexual Activity   Alcohol Use None     Social History     Substance and Sexual Activity   Drug Use Never     Social History     Tobacco Use   Smoking Status Never Smoker   Smokeless Tobacco Never Used     Family History:   Family History   Problem Relation Age of Onset    Hypothyroidism Mother     Allergies Mother     Thyroid disease Mother     Diabetes Father     Hyperlipidemia Father     Hip dysplasia Sister     Seizures Sister     Depression Brother     Substance Abuse Brother         heroin    Stroke Paternal Grandmother     Skin cancer Paternal Grandfather     Prostate cancer Paternal Grandfather     Lung cancer Paternal Grandfather     Cancer Paternal Grandfather         Skin, Prostate, Brain,Lung    Osteoporosis Paternal Grandfather     Personality disorder Sister     Breast cancer Neg Hx     Colon cancer Neg Hx     Ovarian cancer Neg Hx     Uterine cancer Neg Hx     Cervical cancer Neg Hx        Meds/Allergies   Current Outpatient Medications   Medication Sig Dispense Refill    levothyroxine 125 mcg tablet Take 125 mcg by mouth daily         No current facility-administered medications for this visit  Allergies   Allergen Reactions    Other Swelling     almonds    Strawberry C [Ascorbate - Food Allergy] GI Intolerance       Objective   Vitals: Blood pressure 128/68, pulse 95, temperature 98 5 °F (36 9 °C), weight 117 kg (259 lb)  Physical Exam  Vitals reviewed  Constitutional:       General: She is not in acute distress  Appearance: Normal appearance  She is well-developed  She is obese  She is not diaphoretic  HENT:      Head: Normocephalic and atraumatic  Eyes:      General:         Right eye: No discharge  Left eye: No discharge  Conjunctiva/sclera: Conjunctivae normal    Neck:      Thyroid: No thyromegaly  Cardiovascular:      Rate and Rhythm: Normal rate and regular rhythm  Heart sounds: Normal heart sounds  No murmur heard  Pulmonary:      Effort: Pulmonary effort is normal  No respiratory distress  Breath sounds: Normal breath sounds  No wheezing  Abdominal:      General: Bowel sounds are normal  There is no distension  Palpations: Abdomen is soft  Tenderness: There is no abdominal tenderness  Musculoskeletal:         General: No tenderness or deformity  Normal range of motion  Cervical back: Normal range of motion and neck supple  Lymphadenopathy:      Cervical: No cervical adenopathy  Skin:     General: Skin is warm and dry  Findings: No erythema or rash  Neurological:      Mental Status: She is alert and oriented to person, place, and time  Cranial Nerves: No cranial nerve deficit  Motor: No abnormal muscle tone  Deep Tendon Reflexes: Reflexes are normal and symmetric  Reflexes normal    Psychiatric:         Behavior: Behavior normal          The history was obtained from the review of the chart, patient  Lab Results:   Lab Results   Component Value Date/Time    TSH W/RFX TO FREE T4 2 90 09/20/2021 08:05 AM    TSH W/RFX TO FREE T4 4 70 (H) 08/16/2021 08:52 AM    TSH W/RFX TO FREE T4 0 54 06/28/2021 09:54 AM    Free t4 1 1 08/16/2021 08:52 AM       Imaging Studies:   Results for orders placed during the hospital encounter of 03/01/19    US thyroid    Impression  Somewhat small heterogeneous thyroid parenchyma without discrete nodules or hypervascularity  Reference: ACR Thyroid Imaging, Reporting and Data System (TI-RADS): White Paper of the [employer]   J AM Jordan Radiol 5486;15:430-407  (additional recommendations based on American Thyroid Association 2015 guidelines )      Workstation performed: [id number]    Thyroid USG   THYROID ULTRASOUND     INDICATION:    E03 9: Hypothyroidism, unspecified      COMPARISON:  None     TECHNIQUE: Ultrasound of the thyroid was performed with a high frequency linear transducer in transverse and sagittal planes including volumetric imaging sweeps as well as traditional still imaging technique      FINDINGS:  Thyroid parenchyma is diffusely heterogeneous in echotexture      Right lobe:  4 8 x 1 3 x 1 3 cm  Left lobe:  5 2 x 1 1 x 1 4 cm  Isthmus:  0 2 cm      No thyroid nodules or calcifications      No hypervascularity      IMPRESSION:     Somewhat small heterogeneous thyroid parenchyma without discrete nodules or hypervascularity  I have personally reviewed pertinent reports  Portions of the record may have been created with voice recognition software  Occasional wrong word or "sound a like" substitutions may have occurred due to the inherent limitations of voice recognition software  Read the chart carefully and recognize, using context, where substitutions have occurred

## 2022-05-20 LAB
25(OH)D3 SERPL-MCNC: 23 NG/ML (ref 30–100)
FERRITIN SERPL-MCNC: 11 NG/ML (ref 16–154)
IRON SATN MFR SERPL: 35 % (CALC) (ref 16–45)
IRON SERPL-MCNC: 95 MCG/DL (ref 40–190)
T4 FREE SERPL-MCNC: 1.2 NG/DL (ref 0.8–1.8)
TIBC SERPL-MCNC: 273 MCG/DL (CALC) (ref 250–450)
TSH SERPL-ACNC: 1 MIU/L
VIT B12 SERPL-MCNC: 418 PG/ML (ref 200–1100)
VIT B6 SERPL-MCNC: 8.6 NG/ML (ref 2.1–21.7)

## 2022-06-01 DIAGNOSIS — R79.0 LOW FERRITIN: Primary | ICD-10-CM

## 2022-06-02 ENCOUNTER — TELEPHONE (OUTPATIENT)
Dept: HEMATOLOGY ONCOLOGY | Facility: CLINIC | Age: 32
End: 2022-06-02

## 2022-06-02 NOTE — TELEPHONE ENCOUNTER
Patient called saying that she is returning a phone that she had got from the office in regarding scheduling an appointment, patient is asking if the office can give her back a call to schedule her appointment with the office

## 2022-06-03 ENCOUNTER — TELEPHONE (OUTPATIENT)
Dept: ENDOCRINOLOGY | Facility: CLINIC | Age: 32
End: 2022-06-03

## 2022-06-03 NOTE — TELEPHONE ENCOUNTER
Please inform that thyroid blood work is within normal range, continue current dose of levothyroxine/patient wanted to switch to brand Synthroid  Please make prescription ready for brand Synthroid  Also please inform patient that her ferritin was low, iron was on low side, she should discuss with her primary care physician, if she needs iron supplementation  Her vitamin-D 3 is low, she should take vitamin-D 3 supplementation 2000 International Units daily    I see that she has appointment, scheduled with Annie, most likely mistake  She does not need follow-up appointment so early can be canceled      William Branch MD

## 2022-06-11 ENCOUNTER — OFFICE VISIT (OUTPATIENT)
Dept: URGENT CARE | Facility: CLINIC | Age: 32
End: 2022-06-11
Payer: COMMERCIAL

## 2022-06-11 VITALS
RESPIRATION RATE: 16 BRPM | TEMPERATURE: 98.2 F | HEART RATE: 109 BPM | BODY MASS INDEX: 40.18 KG/M2 | HEIGHT: 66 IN | OXYGEN SATURATION: 99 % | WEIGHT: 250 LBS

## 2022-06-11 DIAGNOSIS — J02.9 SORE THROAT: Primary | ICD-10-CM

## 2022-06-11 LAB — S PYO AG THROAT QL: NEGATIVE

## 2022-06-11 PROCEDURE — 87880 STREP A ASSAY W/OPTIC: CPT | Performed by: NURSE PRACTITIONER

## 2022-06-11 PROCEDURE — 99213 OFFICE O/P EST LOW 20 MIN: CPT | Performed by: NURSE PRACTITIONER

## 2022-06-11 PROCEDURE — 87070 CULTURE OTHR SPECIMN AEROBIC: CPT | Performed by: NURSE PRACTITIONER

## 2022-06-11 NOTE — PROGRESS NOTES
[facility]        NAME: Rosa Olmedo is a 29 y o  female  : 1990    MRN: [id number]  DATE: 2022  TIME: 1:26 PM    Assessment and Plan   Sore throat [J02 9]  1  Sore throat  POCT rapid strepA    Throat culture         Patient Instructions     Rapid strep is negative  Will send swab for culture  Tylenol at home prn for pain  Low intensity exercise  Keep appt with hematology and ordered by PCP  Follow up with PCP in 3-5 days  Proceed to  ER if symptoms worsen  Chief Complaint     Chief Complaint   Patient presents with    Sore Throat     Pt was sick since last week -she traveled to [place] was very ill in [place] dizzy, lightheaded, feeling sick to her stomach while in [place]- she returned and the sx got worse  History of Present Illness       HPI   Reports sore throat that started last night  Also reports fatigue x 1 week  Her PCP informed her she may have to go to hematology for possible anemia  Review of Systems   Review of Systems   Constitutional: Positive for fatigue  Negative for chills and fever  HENT: Positive for sore throat  Negative for congestion, postnasal drip and trouble swallowing  Respiratory: Negative for cough, chest tightness, shortness of breath and wheezing  Cardiovascular: Negative for chest pain  Gastrointestinal: Positive for nausea (resolved)  Negative for abdominal pain  Neurological: Positive for light-headedness           Current Medications       Current Outpatient Medications:     levothyroxine 125 mcg tablet, Take 125 mcg by mouth daily  , Disp: , Rfl:     Current Allergies     Allergies as of 2022 - Reviewed 2022   Allergen Reaction Noted    Other Swelling 2018    Strawberry c [ascorbate - food allergy] GI Intolerance 2021            The following portions of the patient's history were reviewed and updated as appropriate: allergies, current medications, past family history, past medical history, past social history, past surgical history and problem list      Past Medical History:   Diagnosis Date    Anxiety     Constipation     Depression     Diarrhea     GERD (gastroesophageal reflux disease)     Hypothyroidism     IBS (irritable bowel syndrome)     Nausea     Panic attacks     Rash     Varicella        Past Surgical History:   Procedure Laterality Date    WISDOM TOOTH EXTRACTION         Family History   Problem Relation Age of Onset    Hypothyroidism Mother    Javier Bones Allergies Mother     Thyroid disease Mother     Diabetes Father     Hyperlipidemia Father     Hip dysplasia Sister     Seizures Sister     Depression Brother     Substance Abuse Brother         heroin    Stroke Paternal Grandmother     Skin cancer Paternal Grandfather     Prostate cancer Paternal Grandfather     Lung cancer Paternal Grandfather     Cancer Paternal Grandfather         Skin, Prostate, Brain,Lung    Osteoporosis Paternal Grandfather     Personality disorder Sister     Breast cancer Neg Hx     Colon cancer Neg Hx     Ovarian cancer Neg Hx     Uterine cancer Neg Hx     Cervical cancer Neg Hx          Medications have been verified  Objective   Pulse (!) 109   Temp 98 2 °F (36 8 °C)   Resp 16   Ht 5' 6" (1 676 m)   Wt 113 kg (250 lb)   SpO2 99%   BMI 40 35 kg/m²   No LMP recorded  Physical Exam     Physical Exam  Constitutional:       Appearance: She is not ill-appearing or diaphoretic  HENT:      Right Ear: Tympanic membrane normal       Left Ear: Tympanic membrane normal       Nose: Rhinorrhea present  Mouth/Throat:      Mouth: Mucous membranes are moist       Pharynx: Posterior oropharyngeal erythema present  No pharyngeal swelling  Tonsils: No tonsillar exudate  0 on the right  0 on the left  Cardiovascular:      Rate and Rhythm: Regular rhythm  Pulmonary:      Effort: Pulmonary effort is normal       Breath sounds: Normal breath sounds  No wheezing

## 2022-06-14 LAB — BACTERIA THROAT CULT: NORMAL

## 2022-06-16 ENCOUNTER — TELEPHONE (OUTPATIENT)
Dept: HEMATOLOGY ONCOLOGY | Facility: CLINIC | Age: 32
End: 2022-06-16

## 2022-06-16 NOTE — TELEPHONE ENCOUNTER
New Patient Intake Form   Patient Details:    Jordi Martinez  1990    Appointment Information   Who is calling to schedule? Hopeline   If not self, what is the caller's name? Ceferino Castillo    DID YOU CONFIRM INSURANCE WITH PATIENT? yes   Referring provider Gail Wynn, DO   What is the diagnosis? Low ferritin     Is there a confirmed tissue diagnosis? No     Is there a biopsy ordered or pending? Please specify dates  If yes, route to NN/OCC   No,  biopsy ordered or pending      Is patient aware of diagnosis? Yes     Have you had any imaging or labs done? If yes, where? (If imaging done outside of [facility], please remind patient to bring a disk ) Yes     05/16     If imaging done at outside facility, did you instruct patient to obtain discs and bring to visit? n/a   Have you been seen by another Oncologist/Hematologist?  If so, who and where? no   Are the records in [facility] or Care Everywhere? yes   Does the patient have records at another facility/hospital?    If yes, Name of facility, city and state where facility is located  no     Did you instruct patient to have records faxed to [facility] and provide rightx number?   n/a   Preferred [place]   Is the patient willing to be seen by another provider? (This is for breast patients only) n/a     Did you send new patient paperwork?   Email or mail? no   Miscellaneous Information: Appt made for 07/05

## 2022-06-17 ENCOUNTER — OFFICE VISIT (OUTPATIENT)
Dept: FAMILY MEDICINE CLINIC | Facility: CLINIC | Age: 32
End: 2022-06-17
Payer: COMMERCIAL

## 2022-06-17 VITALS
TEMPERATURE: 98.5 F | HEIGHT: 66 IN | WEIGHT: 255 LBS | HEART RATE: 80 BPM | BODY MASS INDEX: 40.98 KG/M2 | OXYGEN SATURATION: 98 % | SYSTOLIC BLOOD PRESSURE: 124 MMHG | DIASTOLIC BLOOD PRESSURE: 76 MMHG

## 2022-06-17 DIAGNOSIS — R42 DIZZY SPELLS: ICD-10-CM

## 2022-06-17 DIAGNOSIS — R63.0 POOR APPETITE: ICD-10-CM

## 2022-06-17 DIAGNOSIS — R05.9 COUGH: ICD-10-CM

## 2022-06-17 DIAGNOSIS — J02.9 PHARYNGITIS, UNSPECIFIED ETIOLOGY: Primary | ICD-10-CM

## 2022-06-17 LAB
SARS-COV-2 AG UPPER RESP QL IA: NEGATIVE
VALID CONTROL: NORMAL

## 2022-06-17 PROCEDURE — 87811 SARS-COV-2 COVID19 W/OPTIC: CPT | Performed by: FAMILY MEDICINE

## 2022-06-17 PROCEDURE — 99214 OFFICE O/P EST MOD 30 MIN: CPT | Performed by: FAMILY MEDICINE

## 2022-06-17 RX ORDER — AZITHROMYCIN 250 MG/1
TABLET, FILM COATED ORAL
Qty: 6 TABLET | Refills: 0 | Status: SHIPPED | OUTPATIENT
Start: 2022-06-17 | End: 2022-06-22

## 2022-06-17 NOTE — PROGRESS NOTES
Assessment/Plan:   Diagnoses and all orders for this visit:    Pharyngitis, unspecified etiology  -     azithromycin (Zithromax) 250 mg tablet; Take 2 tablets (500 mg total) by mouth daily for 1 day, THEN 1 tablet (250 mg total) daily for 4 days  - COVID NEG in the office today  - will start on Zpak given constellation of s/s   - strongly encouraged to get COVID IMMs  - discussed importance of hydration with Gatorade/Poweraide if no appetite/difficulty swallowing, rest, and small frequent meals  - f/u PRN   Dizzy spells  Poor appetite  Cough  -     POCT Rapid Covid Ag          Subjective:    Patient ID: Yury Lawrence is a 29 y o  female  HPI   32yo F presents to the office for eval of sick s/s   - was in [facility] on 6/6 for 4days   - (+) dizzy, felt like going to pass out, diarrhea, dark urine   - did wear mask on flight  - no COVID IMMs  - Friday - (+) losing voice, harder to swallow, NEG COVID test   - 6/11 went to  - Strep NEG and was advised to take Tylenol   - 6/12 - Tm 101-102 - resolved after 24hrs   - 6/16/2022 - NEG COVID test   - (+) cough, congestion, runny nose   - COVID NEG in office  - (+) difficulty swallowing - has been drinking Pedialyte, Gatorade, Water   - last meal was last night - mashed potatoes and carrots   - dizzy and lightheaded in the office today       The following portions of the patient's history were reviewed and updated as appropriate: allergies, current medications, past family history, past medical history, past social history, past surgical history and problem list     Review of Systems  as per HPI    Objective:  /76   Pulse 80   Temp 98 5 °F (36 9 °C) (Tympanic)   Ht 5' 6" (1 676 m)   Wt 116 kg (255 lb)   SpO2 98%   BMI 41 16 kg/m²    Physical Exam  Vitals reviewed  Constitutional:       General: She is not in acute distress  Appearance: Normal appearance  She is obese  She is not ill-appearing, toxic-appearing or diaphoretic     HENT:      Head: Normocephalic and atraumatic  Right Ear: External ear normal  No swelling or tenderness  A middle ear effusion is present  Left Ear: External ear normal  No swelling or tenderness  A middle ear effusion is present  Nose: Congestion present  Right Sinus: No maxillary sinus tenderness or frontal sinus tenderness  Left Sinus: No maxillary sinus tenderness or frontal sinus tenderness  Mouth/Throat:      Mouth: Mucous membranes are moist       Pharynx: Oropharynx is clear  Posterior oropharyngeal erythema present  No oropharyngeal exudate  Eyes:      General: No scleral icterus  Right eye: No discharge  Left eye: No discharge  Extraocular Movements: Extraocular movements intact  Conjunctiva/sclera: Conjunctivae normal    Cardiovascular:      Rate and Rhythm: Normal rate and regular rhythm  Heart sounds: Normal heart sounds  No murmur heard  No friction rub  No gallop  Pulmonary:      Effort: Pulmonary effort is normal       Breath sounds: Normal breath sounds  Abdominal:      Palpations: Abdomen is soft  Musculoskeletal:         General: Normal range of motion  Cervical back: Normal range of motion and neck supple  Right lower leg: No edema  Left lower leg: No edema  Skin:     General: Skin is warm  Neurological:      General: No focal deficit present  Mental Status: She is alert and oriented to person, place, and time  Psychiatric:         Mood and Affect: Mood normal          Behavior: Behavior normal          BMI Counseling: Body mass index is 41 16 kg/m²  The BMI is above normal  Nutrition recommendations include 3-5 servings of fruits/vegetables daily  Exercise recommendations include exercising 3-5 times per week

## 2022-07-04 ENCOUNTER — TELEPHONE (OUTPATIENT)
Dept: OTHER | Facility: OTHER | Age: 32
End: 2022-07-04

## 2022-07-04 NOTE — TELEPHONE ENCOUNTER
Pt called, to cancel upcoming appointment since she is sick   Pt requested a call back from office at best convenience to reschedule appointment

## 2022-07-05 ENCOUNTER — TELEPHONE (OUTPATIENT)
Dept: FAMILY MEDICINE CLINIC | Facility: CLINIC | Age: 32
End: 2022-07-05

## 2022-07-05 NOTE — TELEPHONE ENCOUNTER
Patient called to report she tested positive for Covid yesterday at home  Her symptoms began on Sunday - headache, nausea, chills, high fever and phlegm  She stated she feels better today and did not think she needed a virtual visit  She will quarantine for 5 days, followed by masking when in public for an additional 5 days  She will call if she feels she needs a virtual visit

## 2022-07-07 ENCOUNTER — TELEPHONE (OUTPATIENT)
Dept: HEMATOLOGY ONCOLOGY | Facility: CLINIC | Age: 32
End: 2022-07-07

## 2022-07-13 ENCOUNTER — TELEPHONE (OUTPATIENT)
Dept: HEMATOLOGY ONCOLOGY | Facility: CLINIC | Age: 32
End: 2022-07-13

## 2022-07-13 NOTE — TELEPHONE ENCOUNTER
Appointment Confirmation (to confirm pre existing appointments - ONLY)  No need to route   Appointment with Pau Mni   Appointment date & time 7/15/22 @ 10:30am   Location [place]   Patient verbilized Understanding yes

## 2022-07-15 ENCOUNTER — CONSULT (OUTPATIENT)
Dept: HEMATOLOGY ONCOLOGY | Facility: CLINIC | Age: 32
End: 2022-07-15
Payer: COMMERCIAL

## 2022-07-15 VITALS
SYSTOLIC BLOOD PRESSURE: 120 MMHG | DIASTOLIC BLOOD PRESSURE: 70 MMHG | HEIGHT: 66 IN | RESPIRATION RATE: 16 BRPM | WEIGHT: 253 LBS | BODY MASS INDEX: 40.66 KG/M2 | TEMPERATURE: 98.2 F | HEART RATE: 92 BPM | OXYGEN SATURATION: 99 %

## 2022-07-15 DIAGNOSIS — R79.0 LOW FERRITIN: Primary | ICD-10-CM

## 2022-07-15 DIAGNOSIS — E03.9 ACQUIRED HYPOTHYROIDISM: ICD-10-CM

## 2022-07-15 PROCEDURE — 99244 OFF/OP CNSLTJ NEW/EST MOD 40: CPT

## 2022-07-15 NOTE — PROGRESS NOTES
[address] [address]  Hematology Ambulatory Consult  Treva Malloy, 1990, [id number]  7/15/2022    Assessment/Plan:  1  Low ferritin  2  Acquired hypothyroidism  Ms Geraldine Grande is a 28-year-old female seen in consultation for low ferritin level of 11  She has normal serum iron, iron saturation, low TIBC  There is no recent CBC to be review to assess if there is any anemia associated with this  She does not have any sources of chronic blood loss  She does have IBS therefore there may be a component of malabsorption  Her symptoms are consistent with iron deficiency but also could be due to her thyroid  Assuming her hemoglobin is within normal limits she will start oral iron, slow FE, daily  If this causes her any GI distress or constipation she will take Monday Wednesday Friday  If she is unable to tolerate that dosing she will call my office and we will stop  At that time we may discuss the utility in IV iron but I do not feel that that is necessary at this time  She will go for blood work today at [email or web address] and I will call her when these results are available to me with any further recommendations  Her B12 was 418, I will check MMA to assess for definitive B12 deficiency  She will follow-up with me in the office in 1 month with repeat CBC and iron panel to discuss if any further supplementation of iron is needed  - Ambulatory Referral to Hematology / Oncology  - CBC and differential; Future  - Methylmalonic acid, serum; Future  - Ferritin; Future  - Iron; Future  - Iron Saturation %; Future  - TIBC; Future  - CBC and differential  - Methylmalonic acid, serum  - Ferritin  - Iron  - TIBC  - CBC and differential; Future  - Ferritin; Future  - Iron; Future  - Iron Saturation %; Future  - TIBC;  Future  - CBC and differential  - Ferritin  - Iron  - TIBC      The patient is scheduled for follow-up in approximately 1 month  Patient voiced agreement and understanding to the above  Patient knows to call the Hematology/Oncology office with any questions and concerns regarding the above  Barrier(s) to care: None  The patient is able to self care     -------------------------------------------------------------------------------------------------------    Chief Complaint   Patient presents with    Consult       Referring provider:  David Andrade DO  [address]  [facility],  [address] [place] [address]    History of present illness:  Edi Stewart is a 74-year-old female with past medical history of IBS, hypothyroidism, anxiety and depression  She is seen in consultation for low ferritin levels  In review of her CBCs from last year and the years prior she does not appear to have any anemia associated with this, though I do not have a most recent CBC to review  She has not tried taking oral iron  She denies ever being told that she or a family member is diagnosed with thalassemia  She denies history of gastric bypass  Her last colonoscopy was 2010, and it was negative  She had this completed for her diagnosis of IBS  [facility] She denies coffee ground stools, tarry stools, bright red blood per rectum, hematuria, or menorrhagia  Her menstrual cycle is regular every 28 days  She has 4 days of bleeding and only has to change her pad or tampon when she was to the bathroom and does not saturate them  She denies fevers, chills, unintentional weight loss, vomiting, constipation, diarrhea, petechiae/purpura, unexplained ecchymosis, or LE swelling  Her current symptoms include occasional ice cravings, restless legs, brittle nails, thinning hair, fatigue, lightheadedness, dizziness, dyspnea on exertion, heart palpitations, abdominal pain/distension      04/13/2018:  Hemoglobin 13 1, MCV 96, platelets 711, WBC 4 8  04/17/2019:  Hemoglobin 13 7, MCV 92 6, platelets 278, WBC 4 6  04/28/2020:  Hemoglobin 13 3, MCV 93 5, platelets 495, WBC 5 7  08/16/2021:  Hemoglobin 12 9, MCV 91 7, platelets 003, WBC 4 9  05/16/2022: Ferritin 11, iron saturation 35%, serum iron 95, TIBC 273, vitamin B12 418    Review of Systems   Constitutional: Positive for fatigue  Negative for activity change, appetite change, diaphoresis, fever and unexpected weight change  HENT: Negative for trouble swallowing and voice change  Eyes: Negative for photophobia and visual disturbance  Respiratory: Negative for cough, chest tightness and shortness of breath (POPE)  Cardiovascular: Positive for palpitations  Gastrointestinal: Positive for abdominal distention and abdominal pain  Negative for blood in stool, constipation, diarrhea, nausea and vomiting  Endocrine: Negative for cold intolerance and heat intolerance  Genitourinary: Negative for dysuria, hematuria, menstrual problem and urgency  Musculoskeletal: Negative for arthralgias, back pain, gait problem and joint swelling  Restless legs   Skin: Negative for pallor and rash  Neurological: Positive for dizziness and light-headedness  Negative for tremors, weakness and headaches  Hematological: Negative for adenopathy  Does not bruise/bleed easily  Psychiatric/Behavioral: Negative for confusion and sleep disturbance         Patient Active Problem List   Diagnosis    Other malaise and fatigue    Class 2 obesity with body mass index (BMI) of 36 0 to 36 9 in adult    IBS (irritable bowel syndrome)    Hypothyroidism    Anxiety    Depression    Atypical mole    Acute pain of left shoulder    Low ferritin       Past Medical History:   Diagnosis Date    Anxiety     Constipation     Depression     Diarrhea     GERD (gastroesophageal reflux disease)     Hypothyroidism     IBS (irritable bowel syndrome)     Nausea     Panic attacks     Rash     Varicella        Past Surgical History:   Procedure Laterality Date    WISDOM TOOTH EXTRACTION         Family History   Problem Relation Age of Onset    Hypothyroidism Mother    Kerrie Gillette Allergies Mother     Thyroid disease Mother     Diabetes Father     Hyperlipidemia Father     Hip dysplasia Sister     Seizures Sister     Depression Brother     Substance Abuse Brother         heroin    Stroke Paternal Grandmother     Skin cancer Paternal Grandfather     Prostate cancer Paternal Grandfather     Lung cancer Paternal Grandfather     Cancer Paternal Grandfather         Skin, Prostate, Brain,Lung    Osteoporosis Paternal Grandfather     Personality disorder Sister     Breast cancer Neg Hx     Colon cancer Neg Hx     Ovarian cancer Neg Hx     Uterine cancer Neg Hx     Cervical cancer Neg Hx        Social History     Socioeconomic History    Marital status: Single     Spouse name: None    Number of children: None    Years of education: None    Highest education level: None   Occupational History    None   Tobacco Use    Smoking status: Never Smoker    Smokeless tobacco: Never Used   Vaping Use    Vaping Use: Never used   Substance and Sexual Activity    Alcohol use: None    Drug use: Never    Sexual activity: Never   Other Topics Concern    None   Social History Narrative    Brother  in 2017      Social Determinants of Health     Financial Resource Strain: Not on file   Food Insecurity: Not on file   Transportation Needs: Not on file   Physical Activity: Not on file   Stress: Not on file   Social Connections: Not on file   Intimate Partner Violence: Not on file   Housing Stability: Not on file         Current Outpatient Medications:     levothyroxine 125 mcg tablet, Take 125 mcg by mouth daily  , Disp: , Rfl:     Allergies   Allergen Reactions    Other Swelling     almonds    Strawberry C [Ascorbate - Food Allergy] GI Intolerance       Objective:  /70 (BP Location: Left arm, Patient Position: Sitting, Cuff Size: Large)   Pulse 92   Temp 98 2 °F (36 8 °C) (Temporal)   Resp 16   Ht 5' 6" (1 676 m) Wt 115 kg (253 lb)   SpO2 99%   BMI 40 84 kg/m²   Physical Exam  Constitutional:       General: She is not in acute distress  Appearance: Normal appearance  She is not ill-appearing  HENT:      Head: Normocephalic and atraumatic  Eyes:      Extraocular Movements: Extraocular movements intact  Conjunctiva/sclera: Conjunctivae normal    Cardiovascular:      Rate and Rhythm: Normal rate and regular rhythm  Pulses: Normal pulses  Heart sounds: Normal heart sounds  No murmur heard  Pulmonary:      Effort: Pulmonary effort is normal       Breath sounds: Normal breath sounds  No wheezing  Abdominal:      General: Bowel sounds are normal  There is no distension  Palpations: Abdomen is soft  Tenderness: There is no abdominal tenderness  Musculoskeletal:      Cervical back: Normal range of motion  No tenderness  Right lower leg: No edema  Left lower leg: No edema  Lymphadenopathy:      Cervical: No cervical adenopathy  Skin:     General: Skin is warm and dry  Capillary Refill: Capillary refill takes less than 2 seconds  Coloration: Skin is not pale  Findings: No bruising or lesion  Neurological:      General: No focal deficit present  Mental Status: She is alert and oriented to person, place, and time  Mental status is at baseline  Motor: No weakness  Gait: Gait normal    Psychiatric:         Mood and Affect: Mood normal          Behavior: Behavior normal          Thought Content:  Thought content normal          Judgment: Judgment normal          Result Review  Labs:   Lab Results   Component Value Date    IRON 95 05/16/2022    TIBC 273 05/16/2022    FERRITIN 11 (L) 05/16/2022     Lab Results   Component Value Date    WBC 4 9 08/16/2021    HGB 12 9 08/16/2021    HCT 38 6 08/16/2021    MCV 91 7 08/16/2021     08/16/2021     Lab Results   Component Value Date    SODIUM 137 08/16/2021    K 4 4 08/16/2021     08/16/2021    CO2 25 08/16/2021    AGAP 4 04/13/2018    BUN 10 08/16/2021    CREATININE 0 70 08/16/2021    GLUC 84 08/16/2021    GLUF 82 04/13/2018    CALCIUM 9 4 08/16/2021    AST 15 08/16/2021    ALT 11 08/16/2021    ALKPHOS 60 08/16/2021    TP 6 9 08/16/2021    TBILI 0 5 08/16/2021    EGFR 121 04/13/2018       Imaging:  No relevant imaging to review  Please note: This report has been generated by a voice recognition software system  Therefore there may be syntax, spelling, and/or grammatical errors  Please call if you have any questions

## 2022-07-21 LAB
BASOPHILS # BLD AUTO: 60 CELLS/UL (ref 0–200)
BASOPHILS NFR BLD AUTO: 1.3 %
EOSINOPHIL # BLD AUTO: 101 CELLS/UL (ref 15–500)
EOSINOPHIL NFR BLD AUTO: 2.2 %
ERYTHROCYTE [DISTWIDTH] IN BLOOD BY AUTOMATED COUNT: 12.9 % (ref 11–15)
FERRITIN SERPL-MCNC: 66 NG/ML (ref 16–154)
HCT VFR BLD AUTO: 37.8 % (ref 35–45)
HGB BLD-MCNC: 12.8 G/DL (ref 11.7–15.5)
IRON SATN MFR SERPL: 76 % (CALC) (ref 16–45)
IRON SERPL-MCNC: 198 MCG/DL (ref 40–190)
LYMPHOCYTES # BLD AUTO: 1610 CELLS/UL (ref 850–3900)
LYMPHOCYTES NFR BLD AUTO: 35 %
MCH RBC QN AUTO: 30.5 PG (ref 27–33)
MCHC RBC AUTO-ENTMCNC: 33.9 G/DL (ref 32–36)
MCV RBC AUTO: 90 FL (ref 80–100)
METHYLMALONATE SERPL-SCNC: 113 NMOL/L (ref 87–318)
MONOCYTES # BLD AUTO: 460 CELLS/UL (ref 200–950)
MONOCYTES NFR BLD AUTO: 10 %
NEUTROPHILS # BLD AUTO: 2369 CELLS/UL (ref 1500–7800)
NEUTROPHILS NFR BLD AUTO: 51.5 %
PLATELET # BLD AUTO: 272 THOUSAND/UL (ref 140–400)
PMV BLD REES-ECKER: 12.3 FL (ref 7.5–12.5)
RBC # BLD AUTO: 4.2 MILLION/UL (ref 3.8–5.1)
TIBC SERPL-MCNC: 261 MCG/DL (CALC) (ref 250–450)
WBC # BLD AUTO: 4.6 THOUSAND/UL (ref 3.8–10.8)

## 2022-08-09 LAB
BASOPHILS # BLD AUTO: 94 CELLS/UL (ref 0–200)
BASOPHILS NFR BLD AUTO: 1.4 %
EOSINOPHIL # BLD AUTO: 174 CELLS/UL (ref 15–500)
EOSINOPHIL NFR BLD AUTO: 2.6 %
ERYTHROCYTE [DISTWIDTH] IN BLOOD BY AUTOMATED COUNT: 13.2 % (ref 11–15)
FERRITIN SERPL-MCNC: 20 NG/ML (ref 16–154)
HCT VFR BLD AUTO: 39 % (ref 35–45)
HGB BLD-MCNC: 12.7 G/DL (ref 11.7–15.5)
IRON SATN MFR SERPL: 29 % (CALC) (ref 16–45)
IRON SERPL-MCNC: 83 MCG/DL (ref 40–190)
LYMPHOCYTES # BLD AUTO: 2419 CELLS/UL (ref 850–3900)
LYMPHOCYTES NFR BLD AUTO: 36.1 %
MCH RBC QN AUTO: 30.4 PG (ref 27–33)
MCHC RBC AUTO-ENTMCNC: 32.6 G/DL (ref 32–36)
MCV RBC AUTO: 93.3 FL (ref 80–100)
MONOCYTES # BLD AUTO: 616 CELLS/UL (ref 200–950)
MONOCYTES NFR BLD AUTO: 9.2 %
NEUTROPHILS # BLD AUTO: 3397 CELLS/UL (ref 1500–7800)
NEUTROPHILS NFR BLD AUTO: 50.7 %
PLATELET # BLD AUTO: 273 THOUSAND/UL (ref 140–400)
PMV BLD REES-ECKER: 11.6 FL (ref 7.5–12.5)
RBC # BLD AUTO: 4.18 MILLION/UL (ref 3.8–5.1)
TIBC SERPL-MCNC: 288 MCG/DL (CALC) (ref 250–450)
WBC # BLD AUTO: 6.7 THOUSAND/UL (ref 3.8–10.8)

## 2022-08-15 ENCOUNTER — OFFICE VISIT (OUTPATIENT)
Dept: HEMATOLOGY ONCOLOGY | Facility: CLINIC | Age: 32
End: 2022-08-15
Payer: COMMERCIAL

## 2022-08-15 VITALS
TEMPERATURE: 97.9 F | HEIGHT: 66 IN | BODY MASS INDEX: 40.18 KG/M2 | RESPIRATION RATE: 17 BRPM | HEART RATE: 84 BPM | DIASTOLIC BLOOD PRESSURE: 92 MMHG | SYSTOLIC BLOOD PRESSURE: 138 MMHG | WEIGHT: 250 LBS | OXYGEN SATURATION: 99 %

## 2022-08-15 DIAGNOSIS — R79.0 LOW FERRITIN: Primary | ICD-10-CM

## 2022-08-15 PROCEDURE — 99213 OFFICE O/P EST LOW 20 MIN: CPT

## 2022-08-15 RX ORDER — FERROUS SULFATE 325(65) MG
TABLET ORAL
COMMUNITY

## 2022-08-15 NOTE — PROGRESS NOTES
[address] [facility] [address]  Hematology Ambulatory Follow-Up  Ji Boss, 1990, [id number]  8/15/2022    Assessment/Plan:  1  Low ferritin  Ms Blue Belle is a 61-year-old female seen in follow-up low ferritin levels  She has IBS which may be contributing to some malabsorption of oral iron  She has no evidence of chronic blood loss as her menses are regular and mild  Her most recent blood work was taken right after having COVID and states that her thyroid levels were also off  Her ferritin level dropped from 66-20 with a normal iron saturation, TIBC, and serum iron  Her hemoglobin is stable at 12 7 She is very hesitant of IV iron  I explained that she does not exhibit any anemia associated with her iron deficiency therefore it is okay to hold off for now  Her symptoms have improved with taking oral iron every other day  She is tolerating oral iron without major side effects besides some mild constipation  She will start taking a stool softener every day and stay hydrated  She will repeat blood work next month to determine effectiveness of oral iron after having COVID with possibly skewed blood work results  She will then again repeat blood work in 3 months prior to her follow-up with me in the office  She will call me prior to this with any new symptoms or returning symptoms which we would then repeat blood work sooner     - CBC and differential; Future  - Ferritin; Future  - Iron; Future  - TIBC; Future        The patient is scheduled for follow-up in approximately 4 months  Patient voiced agreement and understanding to the above  Patient knows to call the Hematology/Oncology office with any questions and concerns regarding the above        Barrier(s) to care: None  The patient is able to self care   ------------------------------------------------------------------------------------------------------    Chief Complaint   Patient presents with    Follow-up       History of present illness:   Ms Haylee Price is a 21-year-old female seen in follow-up for low ferritin levels  She has no source of chronic blood loss, but does have a diagnosis of IBS with the possibility of a malabsorption component  Her symptoms are consistent with iron deficiency but she also has thyroid disorder and has been hard to regulate her Synthroid dosage  Her hemoglobin has been within normal limits and there are no signs of anemia  She denies ever being told that she or a family member is diagnosed with thalassemia  She denies history of gastric bypass  Her last colonoscopy was 2010, and it was negative  She had this completed for her diagnosis of IBS  [facility] She denies coffee ground stools, tarry stools, bright red blood per rectum, hematuria, or menorrhagia  Her menstrual cycle is regular every 28 days  She has 4 days of bleeding and only has to change her pad or tampon when she was to the bathroom and does not saturate them  She denies fevers, chills, unintentional weight loss, vomiting, constipation, diarrhea, petechiae/purpura, unexplained ecchymosis, or LE swelling      04/13/2018:  Hemoglobin 13 1, MCV 96, platelets 610, WBC 4 8  04/17/2019:  Hemoglobin 13 7, MCV 92 6, platelets 985, WBC 4 6  04/28/2020:  Hemoglobin 13 3, MCV 93 5, platelets 058, WBC 5 7  08/16/2021:  Hemoglobin 12 9, MCV 91 7, platelets 816, WBC 4 9  05/16/2022: Ferritin 11, iron saturation 35%, serum iron 95, TIBC 273, vitamin B12 418  07/18/2022:  Hemoglobin 12 8, MCV 90, platelets 211, WBC 4 6   Ferritin 66, iron saturation 76%, TIBC 269, serum iron 198     08/20/2022:  Hemoglobin 12 7, MCV 93 3, platelets 557, WBC 6 7   Ferritin 20, iron saturation 29%, TIBC 288, serum iron 83    Interval history:  She has been taking oral iron, slow FE, every other day  This has caused her some mild constipation otherwise she is tolerating this well  Her symptoms have been improving despite having COVID few weeks ago  Review of Systems   Constitutional: Positive for fatigue (slightly improved)  Negative for activity change, appetite change, diaphoresis, fever and unexpected weight change  HENT: Negative for trouble swallowing and voice change  Eyes: Negative for photophobia and visual disturbance  Respiratory: Negative for cough, chest tightness and shortness of breath (resolved)  Cardiovascular: Negative for palpitations (resolved)  Gastrointestinal: Positive for constipation  Negative for abdominal distention, abdominal pain, blood in stool, diarrhea, nausea and vomiting  Endocrine: Negative for cold intolerance and heat intolerance  Genitourinary: Negative for dysuria, hematuria, menstrual problem and urgency  Musculoskeletal: Negative for arthralgias, back pain, gait problem and joint swelling  Restless legs   Skin: Negative for pallor and rash  Neurological: Positive for dizziness and light-headedness  Negative for tremors, weakness and headaches  Hematological: Negative for adenopathy  Does not bruise/bleed easily  Psychiatric/Behavioral: Negative for confusion and sleep disturbance         Patient Active Problem List   Diagnosis    Other malaise and fatigue    Class 2 obesity with body mass index (BMI) of 36 0 to 36 9 in adult    IBS (irritable bowel syndrome)    Hypothyroidism    Anxiety    Depression    Atypical mole    Acute pain of left shoulder    Low ferritin       Past Medical History:   Diagnosis Date    Anxiety     Constipation     Depression     Diarrhea     GERD (gastroesophageal reflux disease)     Hypothyroidism     IBS (irritable bowel syndrome)     Nausea     Panic attacks     Rash     Varicella        Past Surgical History:   Procedure Laterality Date    WISDOM TOOTH EXTRACTION Family History   Problem Relation Age of Onset    Hypothyroidism Mother     Allergies Mother     Thyroid disease Mother     Diabetes Father     Hyperlipidemia Father     Hip dysplasia Sister     Seizures Sister     Depression Brother     Substance Abuse Brother         heroin    Stroke Paternal Grandmother     Skin cancer Paternal Grandfather     Prostate cancer Paternal Grandfather     Lung cancer Paternal Grandfather     Cancer Paternal Grandfather         Skin, Prostate, Brain,Lung    Osteoporosis Paternal Grandfather     Personality disorder Sister     Breast cancer Neg Hx     Colon cancer Neg Hx     Ovarian cancer Neg Hx     Uterine cancer Neg Hx     Cervical cancer Neg Hx        Social History     Socioeconomic History    Marital status: Single     Spouse name: None    Number of children: None    Years of education: None    Highest education level: None   Occupational History    None   Tobacco Use    Smoking status: Never Smoker    Smokeless tobacco: Never Used   Vaping Use    Vaping Use: Never used   Substance and Sexual Activity    Alcohol use: None    Drug use: Never    Sexual activity: Never   Other Topics Concern    None   Social History Narrative    Brother  in 2017      Social Determinants of Health     Financial Resource Strain: Not on file   Food Insecurity: Not on file   Transportation Needs: Not on file   Physical Activity: Not on file   Stress: Not on file   Social Connections: Not on file   Intimate Partner Violence: Not on file   Housing Stability: Not on file         Current Outpatient Medications:     ferrous sulfate 325 (65 Fe) mg tablet, Take by mouth daily with breakfast, Disp: , Rfl:     levothyroxine 125 mcg tablet, Take 125 mcg by mouth daily  , Disp: , Rfl:     Allergies   Allergen Reactions    Other Swelling     almonds    Strawberry C [Ascorbate - Food Allergy] GI Intolerance       Objective:  /92 (BP Location: Left arm, Patient Position: Sitting, Cuff Size: Adult)   Pulse 84   Temp 97 9 °F (36 6 °C) (Temporal)   Resp 17   Ht 5' 6" (1 676 m)   Wt 113 kg (250 lb)   LMP 08/11/2022 (Exact Date)   SpO2 99%   BMI 40 35 kg/m²    Physical Exam  Constitutional:       General: She is not in acute distress  Appearance: Normal appearance  She is not ill-appearing  HENT:      Head: Normocephalic and atraumatic  Eyes:      Extraocular Movements: Extraocular movements intact  Conjunctiva/sclera: Conjunctivae normal    Cardiovascular:      Rate and Rhythm: Normal rate and regular rhythm  Pulses: Normal pulses  Heart sounds: Normal heart sounds  No murmur heard  Pulmonary:      Effort: Pulmonary effort is normal       Breath sounds: Normal breath sounds  No wheezing  Abdominal:      General: Bowel sounds are normal  There is no distension  Palpations: Abdomen is soft  Tenderness: There is no abdominal tenderness  Musculoskeletal:      Cervical back: Normal range of motion  No tenderness  Right lower leg: No edema  Left lower leg: No edema  Lymphadenopathy:      Cervical: No cervical adenopathy  Skin:     General: Skin is warm and dry  Capillary Refill: Capillary refill takes less than 2 seconds  Coloration: Skin is not pale  Findings: No bruising or lesion  Neurological:      General: No focal deficit present  Mental Status: She is alert and oriented to person, place, and time  Mental status is at baseline  Motor: No weakness  Gait: Gait normal    Psychiatric:         Mood and Affect: Mood normal          Behavior: Behavior normal          Thought Content: Thought content normal          Judgment: Judgment normal          Result Review  Labs:  No visits with results within 1 Month(s) from this visit     Latest known visit with results is:   Consult on 07/15/2022   Component Date Value Ref Range Status    White Blood Cell Count 07/18/2022 4 6  3 8 - 10 8 Thousand/uL Final    Red Blood Cell Count 07/18/2022 4 20  3 80 - 5 10 Million/uL Final    Hemoglobin 07/18/2022 12 8  11 7 - 15 5 g/dL Final    HCT 07/18/2022 37 8  35 0 - 45 0 % Final    MCV 07/18/2022 90 0  80 0 - 100 0 fL Final    MCH 07/18/2022 30 5  27 0 - 33 0 pg Final    MCHC 07/18/2022 33 9  32 0 - 36 0 g/dL Final    RDW 07/18/2022 12 9  11 0 - 15 0 % Final    Platelet Count 36/56/6359 272  140 - 400 Thousand/uL Final    SL AMB MPV 07/18/2022 12 3  7 5 - 12 5 fL Final    Neutrophils (Absolute) 07/18/2022 2,369  1,500 - 7,800 cells/uL Final    Lymphocytes (Absolute) 07/18/2022 1,610  850 - 3,900 cells/uL Final    Monocytes (Absolute) 07/18/2022 460  200 - 950 cells/uL Final    Eosinophils (Absolute) 07/18/2022 101  15 - 500 cells/uL Final    Basophils ABS 07/18/2022 60  0 - 200 cells/uL Final    Neutrophils 07/18/2022 51 5  % Final    Lymphocytes 07/18/2022 35 0  % Final    Monocytes 07/18/2022 10 0  % Final    Eosinophils 07/18/2022 2 2  % Final    Basophils PCT 07/18/2022 1 3  % Final    Methylmalonic Acid, S 07/18/2022 113  87 - 318 nmol/L Final    Comment: This test was developed and its analytical performance  characteristics have been determined by [address] [place] [address]  It has  not been cleared or approved by the [place] S  Food and Drug  Administration  This assay has been validated pursuant  to the CLIA regulations and is used for clinical  purposes           Ferritin 07/18/2022 66  16 - 154 ng/mL Final    Iron, Serum 07/18/2022 198 (A) 40 - 190 mcg/dL Final    Total Iron Binding Capacity (TIBC) 07/18/2022 261  250 - 450 mcg/dL (calc) Final    Iron Saturation 07/18/2022 76 (A) 16 - 45 % (calc) Final    White Blood Cell Count 08/08/2022 6 7  3 8 - 10 8 Thousand/uL Final    Red Blood Cell Count 08/08/2022 4 18  3 80 - 5 10 Million/uL Final    Hemoglobin 08/08/2022 12 7  11 7 - 15 5 g/dL Final    HCT 08/08/2022 39 0  35 0 - 45 0 % Final    MCV 08/08/2022 93 3  80 0 - 100 0 fL Final    MCH 08/08/2022 30 4  27 0 - 33 0 pg Final    MCHC 08/08/2022 32 6  32 0 - 36 0 g/dL Final    RDW 08/08/2022 13 2  11 0 - 15 0 % Final    Platelet Count 77/33/7769 273  140 - 400 Thousand/uL Final    SL AMB MPV 08/08/2022 11 6  7 5 - 12 5 fL Final    Neutrophils (Absolute) 08/08/2022 3,397  1,500 - 7,800 cells/uL Final    Lymphocytes (Absolute) 08/08/2022 2,419  850 - 3,900 cells/uL Final    Monocytes (Absolute) 08/08/2022 616  200 - 950 cells/uL Final    Eosinophils (Absolute) 08/08/2022 174  15 - 500 cells/uL Final    Basophils ABS 08/08/2022 94  0 - 200 cells/uL Final    Neutrophils 08/08/2022 50 7  % Final    Lymphocytes 08/08/2022 36 1  % Final    Monocytes 08/08/2022 9 2  % Final    Eosinophils 08/08/2022 2 6  % Final    Basophils PCT 08/08/2022 1 4  % Final    Ferritin 08/08/2022 20  16 - 154 ng/mL Final    Iron, Serum 08/08/2022 83  40 - 190 mcg/dL Final    Total Iron Binding Capacity (TIBC) 08/08/2022 288  250 - 450 mcg/dL (calc) Final    Iron Saturation 08/08/2022 29  16 - 45 % (calc) Final       Imaging:   No relevant imaging to review    Please note: This report has been generated by a voice recognition software system  Therefore there may be syntax, spelling, and/or grammatical errors  Please call if you have any questions

## 2022-09-16 ENCOUNTER — TELEPHONE (OUTPATIENT)
Dept: PSYCHIATRY | Facility: CLINIC | Age: 32
End: 2022-09-16

## 2022-09-24 LAB
BASOPHILS # BLD AUTO: 79 CELLS/UL (ref 0–200)
BASOPHILS NFR BLD AUTO: 1.3 %
EOSINOPHIL # BLD AUTO: 128 CELLS/UL (ref 15–500)
EOSINOPHIL NFR BLD AUTO: 2.1 %
ERYTHROCYTE [DISTWIDTH] IN BLOOD BY AUTOMATED COUNT: 13 % (ref 11–15)
FERRITIN SERPL-MCNC: 10 NG/ML (ref 16–154)
HCT VFR BLD AUTO: 38.9 % (ref 35–45)
HGB BLD-MCNC: 13.2 G/DL (ref 11.7–15.5)
IRON SATN MFR SERPL: 30 % (CALC) (ref 16–45)
IRON SERPL-MCNC: 88 MCG/DL (ref 40–190)
LYMPHOCYTES # BLD AUTO: 2141 CELLS/UL (ref 850–3900)
LYMPHOCYTES NFR BLD AUTO: 35.1 %
MCH RBC QN AUTO: 30.4 PG (ref 27–33)
MCHC RBC AUTO-ENTMCNC: 33.9 G/DL (ref 32–36)
MCV RBC AUTO: 89.6 FL (ref 80–100)
MONOCYTES # BLD AUTO: 549 CELLS/UL (ref 200–950)
MONOCYTES NFR BLD AUTO: 9 %
NEUTROPHILS # BLD AUTO: 3203 CELLS/UL (ref 1500–7800)
NEUTROPHILS NFR BLD AUTO: 52.5 %
PLATELET # BLD AUTO: 296 THOUSAND/UL (ref 140–400)
PMV BLD REES-ECKER: 11.9 FL (ref 7.5–12.5)
RBC # BLD AUTO: 4.34 MILLION/UL (ref 3.8–5.1)
TIBC SERPL-MCNC: 293 MCG/DL (CALC) (ref 250–450)
WBC # BLD AUTO: 6.1 THOUSAND/UL (ref 3.8–10.8)

## 2022-09-28 ENCOUNTER — OFFICE VISIT (OUTPATIENT)
Dept: FAMILY MEDICINE CLINIC | Facility: CLINIC | Age: 32
End: 2022-09-28
Payer: COMMERCIAL

## 2022-09-28 VITALS
WEIGHT: 267 LBS | BODY MASS INDEX: 42.91 KG/M2 | RESPIRATION RATE: 16 BRPM | HEART RATE: 90 BPM | HEIGHT: 66 IN | SYSTOLIC BLOOD PRESSURE: 120 MMHG | DIASTOLIC BLOOD PRESSURE: 76 MMHG | OXYGEN SATURATION: 98 %

## 2022-09-28 DIAGNOSIS — E03.9 ACQUIRED HYPOTHYROIDISM: ICD-10-CM

## 2022-09-28 DIAGNOSIS — R79.0 LOW FERRITIN: ICD-10-CM

## 2022-09-28 DIAGNOSIS — Z28.21 INFLUENZA VACCINATION DECLINED BY PATIENT: ICD-10-CM

## 2022-09-28 DIAGNOSIS — R53.82 CHRONIC FATIGUE: Primary | ICD-10-CM

## 2022-09-28 PROCEDURE — 99213 OFFICE O/P EST LOW 20 MIN: CPT | Performed by: FAMILY MEDICINE

## 2022-09-28 NOTE — PROGRESS NOTES
Assessment/Plan:   Diagnoses and all orders for this visit:    Chronic fatigue  -     Vitamin D 25 hydroxy; Future  -     Vitamin B12; Future  - h/o hypothyroidism and low ferritin - follows with Endo and Heme/Onc   - will check Vit D and B12 levels  - f/u after labs - pt aware and agreeable     Acquired hypothyroidism  -  follows with Endo at [facility] (has an appt tmrw AM)  - currently on Levothyroxine 125mcg    Low ferritin  - follows with Heme/Onc   - most recent Ferritin level = 10 (9/23/2022)   - has been taking Fe Q48hr - thought stopped it a few days ago as it was making her sick  - advised to restart Fe and t/c IV Fe infusion as it appears that she is unable to tolerate oral Fe     BMI 40 0-44 9, adult ([address] )  Influenza vaccination declined by patient          Subjective:    Patient ID: Gaby Ramirez is a 29 y o  female  HPI   32yo F presents to the office for eval of chronic fatigue  - (+) h/o hypothyroidism - follows with Endo at [facility] (has an appt tmrw AM)  - currently on Levothyroxine 125mcg  - (+) h/o low ferritin and follows with Heme/Onc - most recent Ferritin level = 10   - has been taking Fe Q48hr - thought stopped it a few days ago as it was making her sick  - denies F/C/N/V/CP/palpitations/SOB/wheezing/abd pain  - has been gaining weight and concerned  - declined Flu vaccine in the office today       The following portions of the patient's history were reviewed and updated as appropriate: allergies, current medications, past family history, past medical history, past social history, past surgical history and problem list     Review of Systems  as per HPI    Objective:  /76 (BP Location: Left arm, Patient Position: Sitting, Cuff Size: Large)   Pulse 90   Resp 16   Ht 5' 6" (1 676 m)   Wt 121 kg (267 lb)   SpO2 98%   BMI 43 09 kg/m²    Physical Exam  Vitals reviewed  Constitutional:       General: She is not in acute distress  Appearance: Normal appearance  She is obese   She is not ill-appearing, toxic-appearing or diaphoretic  HENT:      Head: Normocephalic and atraumatic  Right Ear: External ear normal       Left Ear: External ear normal    Eyes:      General: No scleral icterus  Right eye: No discharge  Left eye: No discharge  Extraocular Movements: Extraocular movements intact  Conjunctiva/sclera: Conjunctivae normal    Pulmonary:      Effort: Pulmonary effort is normal    Musculoskeletal:         General: Normal range of motion  Cervical back: Normal range of motion  Neurological:      General: No focal deficit present  Mental Status: She is alert and oriented to person, place, and time  Psychiatric:         Mood and Affect: Mood normal          Behavior: Behavior normal          BMI Counseling: Body mass index is 43 09 kg/m²  The BMI is above normal  Nutrition recommendations include 3-5 servings of fruits/vegetables daily  Exercise recommendations include exercising 3-5 times per week

## 2022-10-04 LAB
25(OH)D3 SERPL-MCNC: 23 NG/ML (ref 30–100)
VIT B12 SERPL-MCNC: 473 PG/ML (ref 200–1100)

## 2022-10-05 DIAGNOSIS — E55.9 VITAMIN D DEFICIENCY: Primary | ICD-10-CM

## 2022-10-13 ENCOUNTER — TELEPHONE (OUTPATIENT)
Dept: HEMATOLOGY ONCOLOGY | Facility: CLINIC | Age: 32
End: 2022-10-13

## 2022-10-19 ENCOUNTER — TELEPHONE (OUTPATIENT)
Dept: HEMATOLOGY ONCOLOGY | Facility: CLINIC | Age: 32
End: 2022-10-19

## 2022-10-19 DIAGNOSIS — R79.0 LOW FERRITIN: ICD-10-CM

## 2022-10-19 DIAGNOSIS — K62.5 RECTAL BLEEDING: Primary | ICD-10-CM

## 2022-10-19 NOTE — TELEPHONE ENCOUNTER
CALL RETURN FORM   Reason for patient call? Patient is calling in regarding a call back on treatment options    Patient's primary oncologist?  Jacobo Pace    Name of person the patient was calling for?   Kvng Paniagua    Any additional information to add, if applicable? n/a   Informed patient that the message will be forwarded to the team and someone will get back to them as soon as possible    Did you relay this information to the patient?  yes, patient can be reached back at [phone]

## 2022-10-19 NOTE — TELEPHONE ENCOUNTER
Called and spoke with patient  She is hesitant to start IV iron d/t side effects  She would like to try iron drops instead  She states she has developed bright red rectal bleeding after starting slow Fe  She states she has been having constipation and straining  Discussed with Michelle August for iron drops and repeat labs in one month  Referral to GI for rectal bleeding  Patient made aware  Provided number for GI if they do not call to schedule appointment  Blood work orders emailed to patient

## 2022-10-26 ENCOUNTER — TELEPHONE (OUTPATIENT)
Dept: HEMATOLOGY ONCOLOGY | Facility: CLINIC | Age: 32
End: 2022-10-26

## 2022-10-26 DIAGNOSIS — D50.9 IRON DEFICIENCY ANEMIA, UNSPECIFIED IRON DEFICIENCY ANEMIA TYPE: Primary | ICD-10-CM

## 2022-10-26 DIAGNOSIS — R79.0 LOW FERRITIN: ICD-10-CM

## 2022-10-26 RX ORDER — SODIUM CHLORIDE 9 MG/ML
20 INJECTION, SOLUTION INTRAVENOUS ONCE
Status: CANCELLED | OUTPATIENT
Start: 2022-11-07

## 2022-10-26 NOTE — TELEPHONE ENCOUNTER
Received Savy[employer]aph[employer] message from patient that she would like to start Venofer infusions per Samara Savage' recommendation  Orders placed  She is aware someone from our central scheduling department will be reaching out with dates/ times

## 2022-10-26 NOTE — TELEPHONE ENCOUNTER
Patient is willing to go to   [facility]    She really doesn't want to go to [facility]  She'll go only if it's to get started and can then get into switched to Meh[facility] or Jimmy

## 2022-10-26 NOTE — TELEPHONE ENCOUNTER
AN first avail is for 11/28 at 10am  Would patient agree to have this appointment? If, not patient will have to provide a second choice of infusion site

## 2022-10-26 NOTE — TELEPHONE ENCOUNTER
Patient scheduled to start at [facility] on 11/07/22  Please have dates changed

## 2022-11-07 ENCOUNTER — HOSPITAL ENCOUNTER (OUTPATIENT)
Dept: INFUSION CENTER | Facility: HOSPITAL | Age: 32
Discharge: HOME | End: 2022-11-07
Attending: INTERNAL MEDICINE

## 2022-11-07 VITALS
TEMPERATURE: 97.8 F | DIASTOLIC BLOOD PRESSURE: 70 MMHG | HEART RATE: 68 BPM | RESPIRATION RATE: 18 BRPM | SYSTOLIC BLOOD PRESSURE: 120 MMHG

## 2022-11-07 DIAGNOSIS — R79.0 LOW FERRITIN: Primary | ICD-10-CM

## 2022-11-07 DIAGNOSIS — D50.9 IRON DEFICIENCY ANEMIA, UNSPECIFIED IRON DEFICIENCY ANEMIA TYPE: ICD-10-CM

## 2022-11-07 RX ORDER — SODIUM CHLORIDE 9 MG/ML
20 INJECTION, SOLUTION INTRAVENOUS ONCE
Status: COMPLETED | OUTPATIENT
Start: 2022-11-07 | End: 2022-11-07

## 2022-11-07 RX ORDER — SODIUM CHLORIDE 9 MG/ML
20 INJECTION, SOLUTION INTRAVENOUS ONCE
Status: CANCELLED | OUTPATIENT
Start: 2022-11-14

## 2022-11-07 RX ADMIN — SODIUM CHLORIDE 20 ML/HR: 0.9 INJECTION, SOLUTION INTRAVENOUS at 08:29

## 2022-11-07 RX ADMIN — IRON SUCROSE 200 MG: 20 INJECTION, SOLUTION INTRAVENOUS at 08:30

## 2022-11-14 ENCOUNTER — OFFICE VISIT (OUTPATIENT)
Dept: FAMILY MEDICINE CLINIC | Facility: CLINIC | Age: 32
End: 2022-11-14

## 2022-11-14 ENCOUNTER — HOSPITAL ENCOUNTER (OUTPATIENT)
Dept: INFUSION CENTER | Facility: HOSPITAL | Age: 32
Discharge: HOME | End: 2022-11-14
Attending: INTERNAL MEDICINE

## 2022-11-14 VITALS
HEART RATE: 78 BPM | WEIGHT: 271.2 LBS | BODY MASS INDEX: 43.58 KG/M2 | HEIGHT: 66 IN | RESPIRATION RATE: 18 BRPM | SYSTOLIC BLOOD PRESSURE: 125 MMHG | OXYGEN SATURATION: 98 % | DIASTOLIC BLOOD PRESSURE: 74 MMHG | TEMPERATURE: 98.5 F

## 2022-11-14 VITALS
DIASTOLIC BLOOD PRESSURE: 79 MMHG | TEMPERATURE: 97.6 F | RESPIRATION RATE: 18 BRPM | HEART RATE: 97 BPM | SYSTOLIC BLOOD PRESSURE: 130 MMHG

## 2022-11-14 DIAGNOSIS — J02.9 SORE THROAT: ICD-10-CM

## 2022-11-14 DIAGNOSIS — R79.0 LOW FERRITIN: Primary | ICD-10-CM

## 2022-11-14 DIAGNOSIS — J06.9 ACUTE URI: Primary | ICD-10-CM

## 2022-11-14 DIAGNOSIS — D50.9 IRON DEFICIENCY ANEMIA, UNSPECIFIED IRON DEFICIENCY ANEMIA TYPE: ICD-10-CM

## 2022-11-14 LAB — S PYO AG THROAT QL: NEGATIVE

## 2022-11-14 RX ORDER — SODIUM CHLORIDE 9 MG/ML
20 INJECTION, SOLUTION INTRAVENOUS ONCE
Status: CANCELLED | OUTPATIENT
Start: 2022-11-21

## 2022-11-14 RX ORDER — SODIUM CHLORIDE 9 MG/ML
20 INJECTION, SOLUTION INTRAVENOUS ONCE
Status: COMPLETED | OUTPATIENT
Start: 2022-11-14 | End: 2022-11-14

## 2022-11-14 RX ADMIN — SODIUM CHLORIDE 20 ML/HR: 0.9 INJECTION, SOLUTION INTRAVENOUS at 08:49

## 2022-11-14 RX ADMIN — SODIUM CHLORIDE 200 MG: 900 INJECTION, SOLUTION INTRAVENOUS at 08:49

## 2022-11-14 NOTE — PROGRESS NOTES
Name: Twyla Vasquez      : 1990      MRN: [id number]  Encounter Provider: MARYCRUZ Abdullahi  Encounter Date: 2022   [place] [facility] [place] [address]     1  Acute URI  -     POCT rapid strepA    2  Sore throat  -     POCT rapid strepA        Patient reports a sore throat and right ear pain since last night  Patient also reports post nasal drip  Denies any fever, cough, vomiting, or diarrhea  Patient reports that it hurts when she swallows but she is able to swallow fluids and food  Right TM wnl  Posterior pharynx wnl  Rapid strep done and was negative  Discussed with the patient that her symptoms are most likely caused by a viral URI  Patient instructed to drink plenty of fluids  Patient instructed to try Flonase OTC for post nasal drip  Patient instructed to follow-up if symptoms get worse or do not get better  Subjective      Patient reports a sore throat since last night  Patient also reports right ear pain  Patient reports that it hurts when she swallows but she is able to swallow foods and liquids  Denies any fever  Patient also reports post nasal drip  Denies any cough, vomiting, or diarrhea  Patient reports that she took ibuprofen OTC  Patient reports that her symptoms are not going away  Review of Systems   Constitutional: Negative for chills and fever  HENT:        As noted in HPI  Eyes: Negative for pain, discharge and redness  Respiratory: Negative for cough, chest tightness, shortness of breath and wheezing  Cardiovascular: Negative for chest pain  Gastrointestinal: Negative for abdominal pain, diarrhea, nausea and vomiting  Musculoskeletal: Negative for myalgias  Skin: Negative for rash  Neurological: Negative for dizziness, syncope, light-headedness and headaches         Current Outpatient Medications on File Prior to Visit   Medication Sig   • ferrous sulfate 325 (65 Fe) mg tablet Take by mouth daily with breakfast   • levothyroxine 125 mcg tablet Take 125 mcg by mouth daily     • Cholecalciferol 1 25 MG (00274 UT) TABS Take 1 tablet (50,000 Units total) by mouth every 7 days x12wks and then take OTC Vit D 4000IU QD (Patient not taking: Reported on 11/14/2022)       Objective     /74   Pulse 78   Temp 98 5 °F (36 9 °C)   Resp 18   Ht 5' 6" (1 676 m)   Wt 123 kg (271 lb 3 2 oz)   LMP 11/14/2022   SpO2 98%   BMI 43 77 kg/m²     Physical Exam  Vitals reviewed  Constitutional:       General: She is not in acute distress  Appearance: She is not diaphoretic  HENT:      Right Ear: Tympanic membrane, ear canal and external ear normal       Left Ear: Tympanic membrane, ear canal and external ear normal       Nose: Congestion present  Mouth/Throat:      Mouth: Mucous membranes are moist       Pharynx: Oropharynx is clear  No posterior oropharyngeal erythema  Eyes:      Conjunctiva/sclera: Conjunctivae normal       Pupils: Pupils are equal, round, and reactive to light  Cardiovascular:      Rate and Rhythm: Normal rate and regular rhythm  Pulses: Normal pulses  Heart sounds: Normal heart sounds  Pulmonary:      Effort: Pulmonary effort is normal  No respiratory distress  Breath sounds: Normal breath sounds  No wheezing  Musculoskeletal:      Comments: Gait wnl  Skin:     Findings: No rash  Neurological:      Mental Status: She is alert and oriented to person, place, and time     Psychiatric:         Mood and Affect: Mood normal        MARYCRUZ Ojeda

## 2022-11-17 ENCOUNTER — TELEPHONE (OUTPATIENT)
Dept: GASTROENTEROLOGY | Facility: CLINIC | Age: 32
End: 2022-11-17

## 2022-11-17 ENCOUNTER — CONSULT (OUTPATIENT)
Dept: GASTROENTEROLOGY | Facility: CLINIC | Age: 32
End: 2022-11-17

## 2022-11-17 VITALS
HEART RATE: 98 BPM | WEIGHT: 264.8 LBS | DIASTOLIC BLOOD PRESSURE: 85 MMHG | HEIGHT: 66 IN | SYSTOLIC BLOOD PRESSURE: 124 MMHG | BODY MASS INDEX: 42.56 KG/M2

## 2022-11-17 DIAGNOSIS — K21.9 GASTROESOPHAGEAL REFLUX DISEASE, UNSPECIFIED WHETHER ESOPHAGITIS PRESENT: Primary | ICD-10-CM

## 2022-11-17 DIAGNOSIS — E61.1 IRON DEFICIENCY: ICD-10-CM

## 2022-11-17 DIAGNOSIS — K62.5 RECTAL BLEEDING: ICD-10-CM

## 2022-11-17 NOTE — TELEPHONE ENCOUNTER
Scheduled date of EGD/colonoscopy (as of today):12/6/22  Physician performing EGD/colonoscopy:Nallely  Location of EGD/colonoscopy: [facility]  Desired bowel prep reviewed with patient: Clenpiq  Instructions reviewed with patient by: Paddy Life  Clearances:  None

## 2022-11-17 NOTE — PROGRESS NOTES
[facility] - Outpatient Consultation  Gaby Ramirez 29 y o  female MRN: [id number]  Encounter: [id number]          ASSESSMENT AND PLAN:      1  Rectal bleeding  Patient with rectal bleeding which likely is outlet in pathology and likely hemorrhoidal   Does have iron deficiency but no significant anemia but does require IV iron infusions  Will plan for colonoscopy for further evaluation   - Ambulatory Referral to Gastroenterology  - Colonoscopy; Future    2  Gastroesophageal reflux disease, unspecified whether esophagitis present  Patient with longstanding GERD symptoms, plan for EGD at time of colonoscopy  - EGD; Future    3  Iron deficiency  Patient with history of iron deficiency, as above  Rectal bleeding is intermittent and seems to be exacerbated after taking p o  iron  Celiac panel was negative last year  Continue IV iron for recommendations of heme Onc   - Colonoscopy; Future  - EGD; Future    ______________________________________________________________________    HPI:    This is a 74-year-old female who presents today for consultation for rectal bleeding  Patient reports she was diagnosed with iron deficiency and put on slow as the and has been noting rectal bleeding intermittently since that time  Does have history of IBS  Had EGD and colonoscopy at age 27  Was checked for celiac last year with tissue transglutaminase IgA which was negative  She reports that the bleeding can occur maybe once a week and has been going on for the past 3 months  Denies any heavy menses  She otherwise reports that she has no family history of malignancy of GI tract  Denies any unexplained weight loss  She otherwise now has switched to iron infusions  Patient does have fluctuating bowel habits and goes between diarrhea and constipation  Does have significant reflux symptoms and was taking Tums which were not helping too much  Does primarily have nocturnal symptoms        REVIEW OF SYSTEMS:    CONSTITUTIONAL: Denies any fever, chills, rigors, and weight loss  HEENT: No earache or tinnitus  Denies hearing loss or visual disturbances  CARDIOVASCULAR: No chest pain or palpitations  RESPIRATORY: Denies any cough, hemoptysis, shortness of breath or dyspnea on exertion  GASTROINTESTINAL: As noted in the History of Present Illness  GENITOURINARY: No problems with urination  Denies any hematuria or dysuria  NEUROLOGIC: No dizziness or vertigo, denies headaches  MUSCULOSKELETAL: Denies any muscle or joint pain  SKIN: Denies skin rashes or itching  ENDOCRINE: Denies excessive thirst  Denies intolerance to heat or cold  PSYCHOSOCIAL: Denies depression or anxiety  Denies any recent memory loss         Historical Information   Past Medical History:   Diagnosis Date   • Anxiety    • Constipation    • Depression    • Diarrhea    • GERD (gastroesophageal reflux disease)    • Hypothyroidism    • IBS (irritable bowel syndrome)    • Nausea    • Panic attacks    • Rash    • Varicella      Past Surgical History:   Procedure Laterality Date   • WISDOM TOOTH EXTRACTION       Social History   Social History     Substance and Sexual Activity   Alcohol Use Not Currently     Social History     Substance and Sexual Activity   Drug Use Never     Social History     Tobacco Use   Smoking Status Never   Smokeless Tobacco Never     Family History   Problem Relation Age of Onset   • Hypothyroidism Mother    • Allergies Mother    • Thyroid disease Mother    • Diabetes Father    • Hyperlipidemia Father    • Hip dysplasia Sister    • Seizures Sister    • Depression Brother    • Substance Abuse Brother         heroin   • Stroke Paternal Grandmother    • Skin cancer Paternal Grandfather    • Prostate cancer Paternal Grandfather    • Lung cancer Paternal Grandfather    • Cancer Paternal Grandfather         Skin, Prostate, Brain,Lung   • Osteoporosis Paternal Grandfather    • Personality disorder Sister    • Breast cancer Neg Hx    • Colon cancer Neg Hx    • Ovarian cancer Neg Hx    • Uterine cancer Neg Hx    • Cervical cancer Neg Hx        Meds/Allergies       Current Outpatient Medications:   •  Cholecalciferol 1 25 MG (89838 UT) TABS  •  ferrous sulfate 325 (65 Fe) mg tablet  •  levothyroxine 125 mcg tablet    Allergies   Allergen Reactions   • Other Shortness Of Breath     almonds   • Strawberry C [Ascorbate - Food Allergy] GI Intolerance           Objective     Blood pressure 124/85, pulse 98, height 5' 6" (1 676 m), weight 120 kg (264 lb 12 8 oz), last menstrual period 11/14/2022  Body mass index is 42 74 kg/m²  PHYSICAL EXAM:      General Appearance:   Alert, cooperative, no distress   HEENT:   Normocephalic, atraumatic, anicteric      Neck:  Supple, symmetrical, trachea midline   Lungs:   Clear to auscultation bilaterally; no rales, rhonchi or wheezing; respirations unlabored    Heart[de-identified]   Regular rate and rhythm; no murmur, rub, or gallop  Abdomen:   Soft, non-tender, non-distended; normal bowel sounds; no masses, no organomegaly    Genitalia:   Deferred    Rectal:   Deferred    Extremities:  No cyanosis, clubbing or edema    Pulses:  2+ and symmetric    Skin:  No jaundice, rashes, or lesions    Lymph nodes:  No palpable cervical lymphadenopathy        Lab Results:   No visits with results within 1 Day(s) from this visit  Latest known visit with results is:   Office Visit on 11/14/2022   Component Date Value   •  RAPID STREP A 11/14/2022 Negative          Radiology Results:   No results found

## 2022-11-21 ENCOUNTER — HOSPITAL ENCOUNTER (OUTPATIENT)
Dept: INFUSION CENTER | Facility: HOSPITAL | Age: 32
Discharge: HOME | End: 2022-11-21
Attending: INTERNAL MEDICINE

## 2022-11-21 VITALS — DIASTOLIC BLOOD PRESSURE: 94 MMHG | HEART RATE: 86 BPM | TEMPERATURE: 98.1 F | SYSTOLIC BLOOD PRESSURE: 132 MMHG

## 2022-11-21 DIAGNOSIS — D50.9 IRON DEFICIENCY ANEMIA, UNSPECIFIED IRON DEFICIENCY ANEMIA TYPE: ICD-10-CM

## 2022-11-21 DIAGNOSIS — R79.0 LOW FERRITIN: Primary | ICD-10-CM

## 2022-11-21 RX ORDER — SODIUM CHLORIDE 9 MG/ML
20 INJECTION, SOLUTION INTRAVENOUS ONCE
Status: CANCELLED | OUTPATIENT
Start: 2022-11-28

## 2022-11-21 RX ORDER — SODIUM CHLORIDE 9 MG/ML
20 INJECTION, SOLUTION INTRAVENOUS ONCE
Status: COMPLETED | OUTPATIENT
Start: 2022-11-21 | End: 2022-11-21

## 2022-11-21 RX ADMIN — SODIUM CHLORIDE 20 ML/HR: 0.9 INJECTION, SOLUTION INTRAVENOUS at 07:50

## 2022-11-21 RX ADMIN — SODIUM CHLORIDE 200 MG: 900 INJECTION, SOLUTION INTRAVENOUS at 07:50

## 2022-11-21 NOTE — PROGRESS NOTES
Infusion complete without incident, pt aware of next appt at [facility]   Declined avs

## 2022-11-28 ENCOUNTER — HOSPITAL ENCOUNTER (OUTPATIENT)
Dept: INFUSION CENTER | Facility: CLINIC | Age: 32
Discharge: HOME | End: 2022-11-28

## 2022-11-28 VITALS — TEMPERATURE: 97.5 F | SYSTOLIC BLOOD PRESSURE: 124 MMHG | DIASTOLIC BLOOD PRESSURE: 81 MMHG | HEART RATE: 79 BPM

## 2022-11-28 DIAGNOSIS — R79.0 LOW FERRITIN: Primary | ICD-10-CM

## 2022-11-28 DIAGNOSIS — D50.9 IRON DEFICIENCY ANEMIA, UNSPECIFIED IRON DEFICIENCY ANEMIA TYPE: ICD-10-CM

## 2022-11-28 RX ORDER — SODIUM CHLORIDE 9 MG/ML
20 INJECTION, SOLUTION INTRAVENOUS ONCE
Status: CANCELLED | OUTPATIENT
Start: 2022-12-05

## 2022-11-28 RX ORDER — SODIUM CHLORIDE 9 MG/ML
20 INJECTION, SOLUTION INTRAVENOUS ONCE
Status: COMPLETED | OUTPATIENT
Start: 2022-11-28 | End: 2022-11-28

## 2022-11-28 RX ADMIN — IRON SUCROSE 200 MG: 20 INJECTION, SOLUTION INTRAVENOUS at 10:45

## 2022-11-28 RX ADMIN — SODIUM CHLORIDE 20 ML/HR: 0.9 INJECTION, SOLUTION INTRAVENOUS at 10:39

## 2022-11-29 ENCOUNTER — TELEPHONE (OUTPATIENT)
Dept: GASTROENTEROLOGY | Facility: CLINIC | Age: 32
End: 2022-11-29

## 2022-12-05 ENCOUNTER — HOSPITAL ENCOUNTER (OUTPATIENT)
Dept: INFUSION CENTER | Facility: CLINIC | Age: 32
Discharge: HOME | End: 2022-12-05

## 2022-12-05 VITALS
SYSTOLIC BLOOD PRESSURE: 128 MMHG | TEMPERATURE: 97.3 F | RESPIRATION RATE: 16 BRPM | DIASTOLIC BLOOD PRESSURE: 75 MMHG | HEART RATE: 92 BPM | OXYGEN SATURATION: 99 %

## 2022-12-05 DIAGNOSIS — R79.0 LOW FERRITIN: Primary | ICD-10-CM

## 2022-12-05 DIAGNOSIS — D50.9 IRON DEFICIENCY ANEMIA, UNSPECIFIED IRON DEFICIENCY ANEMIA TYPE: ICD-10-CM

## 2022-12-05 RX ORDER — SODIUM CHLORIDE 9 MG/ML
20 INJECTION, SOLUTION INTRAVENOUS ONCE
Status: CANCELLED | OUTPATIENT
Start: 2022-12-12

## 2022-12-05 RX ORDER — SODIUM CHLORIDE 9 MG/ML
20 INJECTION, SOLUTION INTRAVENOUS ONCE
Status: COMPLETED | OUTPATIENT
Start: 2022-12-05 | End: 2022-12-05

## 2022-12-05 RX ADMIN — SODIUM CHLORIDE 200 MG: 9 INJECTION, SOLUTION INTRAVENOUS at 09:54

## 2022-12-05 RX ADMIN — SODIUM CHLORIDE 20 ML/HR: 0.9 INJECTION, SOLUTION INTRAVENOUS at 09:54

## 2022-12-05 NOTE — PROGRESS NOTES
Patient arrives to infusion center for Venofer today  Patient offers no acute complaints  VSS, PIV obtained by SHIRA RN without issue  Patient resting on recliner chair, call bell within reach

## 2022-12-13 ENCOUNTER — HOSPITAL ENCOUNTER (OUTPATIENT)
Dept: INFUSION CENTER | Facility: CLINIC | Age: 32
Discharge: HOME | End: 2022-12-13

## 2022-12-13 VITALS
OXYGEN SATURATION: 99 % | RESPIRATION RATE: 18 BRPM | DIASTOLIC BLOOD PRESSURE: 80 MMHG | SYSTOLIC BLOOD PRESSURE: 125 MMHG | TEMPERATURE: 96.8 F | HEART RATE: 87 BPM

## 2022-12-13 DIAGNOSIS — R79.0 LOW FERRITIN: Primary | ICD-10-CM

## 2022-12-13 DIAGNOSIS — D50.9 IRON DEFICIENCY ANEMIA, UNSPECIFIED IRON DEFICIENCY ANEMIA TYPE: ICD-10-CM

## 2022-12-13 RX ORDER — SODIUM CHLORIDE 9 MG/ML
20 INJECTION, SOLUTION INTRAVENOUS ONCE
Status: CANCELLED | OUTPATIENT
Start: 2022-12-19

## 2022-12-13 RX ORDER — SODIUM CHLORIDE 9 MG/ML
20 INJECTION, SOLUTION INTRAVENOUS ONCE
Status: COMPLETED | OUTPATIENT
Start: 2022-12-13 | End: 2022-12-13

## 2022-12-13 RX ADMIN — SODIUM CHLORIDE 20 ML/HR: 0.9 INJECTION, SOLUTION INTRAVENOUS at 11:10

## 2022-12-13 RX ADMIN — SODIUM CHLORIDE 200 MG: 9 INJECTION, SOLUTION INTRAVENOUS at 11:10

## 2022-12-13 NOTE — PROGRESS NOTES
Patient tolerated treatment without incident  Peripheral IV removed  No further appointments scheduled  AVS offered and declined

## 2022-12-15 LAB
BASOPHILS # BLD AUTO: 90 CELLS/UL (ref 0–200)
BASOPHILS NFR BLD AUTO: 1.5 %
EOSINOPHIL # BLD AUTO: 132 CELLS/UL (ref 15–500)
EOSINOPHIL NFR BLD AUTO: 2.2 %
ERYTHROCYTE [DISTWIDTH] IN BLOOD BY AUTOMATED COUNT: 12.9 % (ref 11–15)
FERRITIN SERPL-MCNC: 191 NG/ML (ref 16–154)
HCT VFR BLD AUTO: 39.8 % (ref 35–45)
HGB BLD-MCNC: 13.3 G/DL (ref 11.7–15.5)
IRON SATN MFR SERPL: 75 % (CALC) (ref 16–45)
IRON SERPL-MCNC: 194 MCG/DL (ref 40–190)
LYMPHOCYTES # BLD AUTO: 1644 CELLS/UL (ref 850–3900)
LYMPHOCYTES NFR BLD AUTO: 27.4 %
MCH RBC QN AUTO: 30.9 PG (ref 27–33)
MCHC RBC AUTO-ENTMCNC: 33.4 G/DL (ref 32–36)
MCV RBC AUTO: 92.6 FL (ref 80–100)
MONOCYTES # BLD AUTO: 510 CELLS/UL (ref 200–950)
MONOCYTES NFR BLD AUTO: 8.5 %
NEUTROPHILS # BLD AUTO: 3624 CELLS/UL (ref 1500–7800)
NEUTROPHILS NFR BLD AUTO: 60.4 %
PLATELET # BLD AUTO: 305 THOUSAND/UL (ref 140–400)
PMV BLD REES-ECKER: 11.6 FL (ref 7.5–12.5)
RBC # BLD AUTO: 4.3 MILLION/UL (ref 3.8–5.1)
TIBC SERPL-MCNC: 259 MCG/DL (CALC) (ref 250–450)
WBC # BLD AUTO: 6 THOUSAND/UL (ref 3.8–10.8)

## 2022-12-19 ENCOUNTER — OFFICE VISIT (OUTPATIENT)
Dept: HEMATOLOGY ONCOLOGY | Facility: CLINIC | Age: 32
End: 2022-12-19

## 2022-12-19 VITALS
SYSTOLIC BLOOD PRESSURE: 112 MMHG | OXYGEN SATURATION: 99 % | BODY MASS INDEX: 43.9 KG/M2 | TEMPERATURE: 98.1 F | WEIGHT: 272 LBS | DIASTOLIC BLOOD PRESSURE: 72 MMHG | HEART RATE: 95 BPM | RESPIRATION RATE: 18 BRPM

## 2022-12-19 DIAGNOSIS — R79.0 LOW FERRITIN: ICD-10-CM

## 2022-12-19 DIAGNOSIS — D50.9 IRON DEFICIENCY ANEMIA, UNSPECIFIED IRON DEFICIENCY ANEMIA TYPE: Primary | ICD-10-CM

## 2022-12-19 NOTE — PROGRESS NOTES
[address] [facility] [address]  Hematology Ambulatory Follow-Up  Hakeem Sommers, 1990, [id number]  12/19/2022    Assessment/Plan:  1  Iron deficiency anemia, unspecified iron deficiency anemia type  2  Low ferritin  Ms Bernarda Roe is a 29-year-old female seen in follow-up for iron deficiency  She has no anemia associated with this  She did not respond well to oral iron and has since received IV Venofer 200 mg x 6 doses  Her most recent infusion was on 1213 and had labs completed the next day demonstrated a ferritin of 191  I explained that these labs are not very accurate but we can assume that she has been adequately supplemented at this time as her symptoms have improved  She continues with some mild fatigue but has also had difficulty regulating her Synthroid dosing  We discussed that since she has no evidence of chronic blood loss and no official malabsorption diagnosis it is uncertain if she will require IV iron in the future  She will continue to have labs checked every 3 months prior to follow-up with me in the office in 6 months  She should continue to follow with gastroenterology for complete work-up  She knows to call the office if her symptoms worsen or return prior to this and we will repeat blood work sooner  - Iron; Standing  - Ferritin; Standing  - TIBC; Standing  - CBC and differential; Standing  - Iron  - Ferritin  - TIBC  - CBC and differential      The patient is scheduled for follow-up in approximately 6 months  Patient voiced agreement and understanding to the above  Patient knows to call the Hematology/Oncology office with any questions and concerns regarding the above  Barrier(s) to care: None  The patient is able to self care   ------------------------------------------------------------------------------------------------------    No chief complaint on file        History of present illness:   Favio Mantilla is a 29-year-old female seen in follow-up for low ferritin  She has no source of chronic leg loss but does have a diagnosis of IBS which could be contributing with the malabsorption component  She has symptoms that could also be associated due to her thyroid condition and has had a difficult time regulating her Synthroid dosage  Her hemoglobin has remained within normal limits and no signs of anemia     04/13/2018:  Hemoglobin 13 1, MCV 96, platelets 678, WBC 4 8  04/17/2019:  Hemoglobin 13 7, MCV 92 6, platelets 517, ZFV 4 6  04/28/2020:  Hemoglobin 13 3, MCV 93 5, platelets 880, WBC 5 7  08/16/2021:  Hemoglobin 12 9, MCV 91 7, platelets 314, WBC 4 9  05/16/2022: Ferritin 11, iron saturation 35%, serum iron 95, TIBC 273, vitamin B12 418  07/18/2022:  Hemoglobin 12 8, MCV 90, platelets 012, WBC 4 6              Ferritin 66, iron saturation 76%, TIBC 269, serum iron 198                08/20/2022:  Hemoglobin 12 7, MCV 93 3, platelets 659, WBC 6 7              Ferritin 20, iron saturation 29%, TIBC 288, serum iron 83  9/23/2022: Hemoglobin 13 2, MCV 89 6, platelets 907, WBC 6 1   Ferritin 10, iron saturation 30%, TIBC 293, serum iron 88  IV Venofer 200 mg x6: 11/7-12/13 12/14/2022: Hemoglobin 13 3, MCV 92 6, platelets 800, WBC 6   Ferritin 191, iron saturation 75%, TIBC 259, serum iron 190    Interval history: She tolerated IV iron well without any significant side effects  She has noted a mild improvement in her fatigue  Her restless legs and lightheadedness/dizziness have since resolved  She is scheduled for colonoscopy with endoscopy with gastroenterology next month  Review of Systems   Constitutional: Positive for fatigue (improving)  Negative for activity change, appetite change, diaphoresis, fever and unexpected weight change  HENT: Negative for trouble swallowing and voice change  Eyes: Negative for photophobia and visual disturbance     Respiratory: Negative for cough, chest tightness and shortness of breath (resolved)  Cardiovascular: Negative for palpitations (resolved)  Gastrointestinal: Negative for abdominal distention, abdominal pain, blood in stool, constipation, diarrhea, nausea and vomiting  Endocrine: Negative for cold intolerance and heat intolerance  Genitourinary: Negative for dysuria, hematuria, menstrual problem and urgency  Musculoskeletal: Negative for arthralgias, back pain, gait problem and joint swelling  Skin: Negative for pallor and rash  Neurological: Negative for dizziness, tremors, weakness, light-headedness and headaches  Hematological: Negative for adenopathy  Does not bruise/bleed easily  Psychiatric/Behavioral: Negative for confusion and sleep disturbance         Patient Active Problem List   Diagnosis   • Other malaise and fatigue   • Class 2 obesity with body mass index (BMI) of 36 0 to 36 9 in adult   • IBS (irritable bowel syndrome)   • Hypothyroidism   • Anxiety   • Depression   • Atypical mole   • Acute pain of left shoulder   • Low ferritin   • Iron deficiency anemia, unspecified   • Acute URI   • Sore throat       Past Medical History:   Diagnosis Date   • Anxiety    • Constipation    • Depression    • Diarrhea    • GERD (gastroesophageal reflux disease)    • Hypothyroidism    • IBS (irritable bowel syndrome)    • Nausea    • Panic attacks    • Rash    • Varicella        Past Surgical History:   Procedure Laterality Date   • WISDOM TOOTH EXTRACTION         Family History   Problem Relation Age of Onset   • Hypothyroidism Mother    • Allergies Mother    • Thyroid disease Mother    • Diabetes Father    • Hyperlipidemia Father    • Hip dysplasia Sister    • Seizures Sister    • Depression Brother    • Substance Abuse Brother         heroin   • Stroke Paternal Grandmother    • Skin cancer Paternal Grandfather    • Prostate cancer Paternal Grandfather    • Lung cancer Paternal Grandfather    • Cancer Paternal Grandfather Skin, Prostate, Brain,Lung   • Osteoporosis Paternal Grandfather    • Personality disorder Sister    • Breast cancer Neg Hx    • Colon cancer Neg Hx    • Ovarian cancer Neg Hx    • Uterine cancer Neg Hx    • Cervical cancer Neg Hx        Social History     Socioeconomic History   • Marital status: Single     Spouse name: None   • Number of children: None   • Years of education: None   • Highest education level: None   Occupational History   • None   Tobacco Use   • Smoking status: Never   • Smokeless tobacco: Never   Vaping Use   • Vaping Use: Never used   Substance and Sexual Activity   • Alcohol use: Not Currently   • Drug use: Never   • Sexual activity: Never   Other Topics Concern   • None   Social History Narrative    Brother  in 2017      Social Determinants of Health     Financial Resource Strain: Not on file   Food Insecurity: Not on file   Transportation Needs: Not on file   Physical Activity: Not on file   Stress: Not on file   Social Connections: Not on file   Intimate Partner Violence: Not on file   Housing Stability: Not on file         Current Outpatient Medications:   •  Cholecalciferol 1 25 MG (99559 UT) TABS, Take 1 tablet (50,000 Units total) by mouth every 7 days x12wks and then take OTC Vit D 4000IU QD, Disp: 12 tablet, Rfl: 0  •  ferrous sulfate 325 (65 Fe) mg tablet, Take by mouth daily with breakfast, Disp: , Rfl:   •  levothyroxine 125 mcg tablet, Take 125 mcg by mouth daily  , Disp: , Rfl:     Allergies   Allergen Reactions   • Other Shortness Of Breath     almonds   • Strawberry C [Ascorbate - Food Allergy] GI Intolerance       Objective:  /72   Pulse 95   Temp 98 1 °F (36 7 °C)   Resp 18   Wt 123 kg (272 lb)   SpO2 99%   BMI 43 90 kg/m²    Physical Exam  Constitutional:       General: She is not in acute distress  Appearance: Normal appearance  She is not ill-appearing  HENT:      Head: Normocephalic and atraumatic     Eyes:      Extraocular Movements: Extraocular movements intact  Conjunctiva/sclera: Conjunctivae normal    Cardiovascular:      Rate and Rhythm: Normal rate and regular rhythm  Pulses: Normal pulses  Heart sounds: Normal heart sounds  No murmur heard  Pulmonary:      Effort: Pulmonary effort is normal       Breath sounds: Normal breath sounds  No wheezing  Abdominal:      General: Bowel sounds are normal  There is no distension  Palpations: Abdomen is soft  Tenderness: There is no abdominal tenderness  Musculoskeletal:      Cervical back: Normal range of motion  No tenderness  Right lower leg: No edema  Left lower leg: No edema  Lymphadenopathy:      Cervical: No cervical adenopathy  Skin:     General: Skin is warm and dry  Capillary Refill: Capillary refill takes less than 2 seconds  Coloration: Skin is not pale  Findings: No bruising or lesion  Neurological:      General: No focal deficit present  Mental Status: She is alert and oriented to person, place, and time  Mental status is at baseline  Motor: No weakness  Gait: Gait normal    Psychiatric:         Mood and Affect: Mood normal          Behavior: Behavior normal          Thought Content: Thought content normal          Judgment: Judgment normal          Result Review  Labs:    Lab Results   Component Value Date    WBC 6 0 12/14/2022    HGB 13 3 12/14/2022    HCT 39 8 12/14/2022    MCV 92 6 12/14/2022     12/14/2022     Lab Results   Component Value Date    IRON 194 (H) 12/14/2022    TIBC 259 12/14/2022    FERRITIN 191 (H) 12/14/2022       Imaging:   No relevant imaging to review     Please note: This report has been generated by a voice recognition software system  Therefore there may be syntax, spelling, and/or grammatical errors  Please call if you have any questions

## 2023-01-12 ENCOUNTER — OFFICE VISIT (OUTPATIENT)
Dept: FAMILY MEDICINE CLINIC | Facility: CLINIC | Age: 33
End: 2023-01-12

## 2023-01-12 VITALS
BODY MASS INDEX: 43.39 KG/M2 | DIASTOLIC BLOOD PRESSURE: 68 MMHG | HEIGHT: 66 IN | WEIGHT: 270 LBS | RESPIRATION RATE: 16 BRPM | HEART RATE: 92 BPM | OXYGEN SATURATION: 97 % | SYSTOLIC BLOOD PRESSURE: 130 MMHG | TEMPERATURE: 98.8 F

## 2023-01-12 DIAGNOSIS — B35.4 TINEA CORPORIS: Primary | ICD-10-CM

## 2023-01-12 RX ORDER — KETOCONAZOLE 20 MG/G
CREAM TOPICAL DAILY
Qty: 15 G | Refills: 0 | Status: SHIPPED | OUTPATIENT
Start: 2023-01-12

## 2023-01-12 NOTE — PROGRESS NOTES
Name: Michael Schuler      : 1990      MRN: [id number]  Encounter Provider: MARYCRUZ Cadet  Encounter Date: 2023   Encounter [facility]: [facility]     1  Tinea corporis  -     ketoconazole (NIZORAL) 2 % cream; Apply topically daily        Patient reports a rash on her chest for the past 2 5 weeks  Patient describes the rash as red spots  Denies any pain or fever  Patient reports occasional itchiness  Few scattered macular erythema spots noted on upper chest    Discussion on tinea corporis and treatment  Ketoconazole cream prescribed  Medication information and side effects reviewed  Patient instructed to follow-up if symptoms get worse or do not get better  Subjective      Patient reports a rash on her chest for the past 2 5 weeks  Patient describes the rash as red spots  Patient reports occasional itchiness  Denies any pain or fever  Patient reports that she tried eczema cream OTC  Patient reports that the rash is getting worse  Review of Systems   Constitutional: Negative for chills and fever  HENT: Negative for congestion, ear pain and sore throat  Respiratory: Negative for cough, chest tightness, shortness of breath and wheezing  Cardiovascular: Negative for chest pain  Gastrointestinal: Negative for abdominal pain, diarrhea, nausea and vomiting  Musculoskeletal: Negative for myalgias  Skin: Positive for rash  Neurological: Negative for dizziness, syncope, light-headedness and headaches         Current Outpatient Medications on File Prior to Visit   Medication Sig   • Cholecalciferol 1 25 MG (22269 UT) TABS Take 1 tablet (50,000 Units total) by mouth every 7 days x12wks and then take OTC Vit D 4000IU QD   • ferrous sulfate 325 (65 Fe) mg tablet Take by mouth daily with breakfast   • levothyroxine 125 mcg tablet Take 125 mcg by mouth daily         Objective     /68   Pulse 92   Temp 98 8 °F (37 1 °C)   Resp 16   Ht 5' 6" (1 676 m)   Wt 122 kg (270 lb)   LMP 01/03/2023 (Approximate)   SpO2 97%   BMI 43 58 kg/m²     Physical Exam  Vitals reviewed  Constitutional:       General: She is not in acute distress  Appearance: She is not ill-appearing or diaphoretic  HENT:      Right Ear: External ear normal       Left Ear: External ear normal       Mouth/Throat:      Comments: Patient is masked  Eyes:      Conjunctiva/sclera: Conjunctivae normal    Cardiovascular:      Rate and Rhythm: Normal rate and regular rhythm  Pulses: Normal pulses  Heart sounds: Normal heart sounds  Pulmonary:      Effort: Pulmonary effort is normal  No respiratory distress  Breath sounds: Normal breath sounds  No wheezing  Musculoskeletal:      Comments: Gait wnl  Skin:     Comments: Few scattered macular erythema spots noted on upper chest     Neurological:      Mental Status: She is alert and oriented to person, place, and time     Psychiatric:         Mood and Affect: Mood normal        MARYCRUZ Dasilva

## 2023-01-13 ENCOUNTER — TELEPHONE (OUTPATIENT)
Dept: GASTROENTEROLOGY | Facility: CLINIC | Age: 33
End: 2023-01-13

## 2023-01-13 PROBLEM — J06.9 ACUTE URI: Status: RESOLVED | Noted: 2022-11-14 | Resolved: 2023-01-13

## 2023-02-15 ENCOUNTER — TELEPHONE (OUTPATIENT)
Dept: PSYCHIATRY | Facility: CLINIC | Age: 33
End: 2023-02-15

## 2023-04-28 LAB
BASOPHILS # BLD AUTO: 88 CELLS/UL (ref 0–200)
BASOPHILS NFR BLD AUTO: 1.3 %
EOSINOPHIL # BLD AUTO: 170 CELLS/UL (ref 15–500)
EOSINOPHIL NFR BLD AUTO: 2.5 %
ERYTHROCYTE [DISTWIDTH] IN BLOOD BY AUTOMATED COUNT: 12.7 % (ref 11–15)
FERRITIN SERPL-MCNC: 57 NG/ML (ref 16–154)
HCT VFR BLD AUTO: 41.8 % (ref 35–45)
HGB BLD-MCNC: 14.1 G/DL (ref 11.7–15.5)
IRON SATN MFR SERPL: 41 % (CALC) (ref 16–45)
IRON SERPL-MCNC: 114 MCG/DL (ref 40–190)
LYMPHOCYTES # BLD AUTO: 2285 CELLS/UL (ref 850–3900)
LYMPHOCYTES NFR BLD AUTO: 33.6 %
MCH RBC QN AUTO: 31.1 PG (ref 27–33)
MCHC RBC AUTO-ENTMCNC: 33.7 G/DL (ref 32–36)
MCV RBC AUTO: 92.3 FL (ref 80–100)
MONOCYTES # BLD AUTO: 626 CELLS/UL (ref 200–950)
MONOCYTES NFR BLD AUTO: 9.2 %
NEUTROPHILS # BLD AUTO: 3631 CELLS/UL (ref 1500–7800)
NEUTROPHILS NFR BLD AUTO: 53.4 %
PLATELET # BLD AUTO: 295 THOUSAND/UL (ref 140–400)
PMV BLD REES-ECKER: 11.6 FL (ref 7.5–12.5)
RBC # BLD AUTO: 4.53 MILLION/UL (ref 3.8–5.1)
TIBC SERPL-MCNC: 280 MCG/DL (CALC) (ref 250–450)
WBC # BLD AUTO: 6.8 THOUSAND/UL (ref 3.8–10.8)

## 2023-06-02 ENCOUNTER — OFFICE VISIT (OUTPATIENT)
Dept: FAMILY MEDICINE CLINIC | Facility: CLINIC | Age: 33
End: 2023-06-02

## 2023-06-02 VITALS
BODY MASS INDEX: 43.71 KG/M2 | DIASTOLIC BLOOD PRESSURE: 80 MMHG | SYSTOLIC BLOOD PRESSURE: 112 MMHG | RESPIRATION RATE: 16 BRPM | WEIGHT: 272 LBS | HEART RATE: 100 BPM | HEIGHT: 66 IN | OXYGEN SATURATION: 98 %

## 2023-06-02 DIAGNOSIS — F41.9 ANXIETY AND DEPRESSION: ICD-10-CM

## 2023-06-02 DIAGNOSIS — F32.A ANXIETY AND DEPRESSION: ICD-10-CM

## 2023-06-02 DIAGNOSIS — R79.0 LOW FERRITIN: ICD-10-CM

## 2023-06-02 DIAGNOSIS — E55.9 VITAMIN D DEFICIENCY: ICD-10-CM

## 2023-06-02 DIAGNOSIS — D22.9 ATYPICAL NEVI: ICD-10-CM

## 2023-06-02 DIAGNOSIS — M25.551 PAIN OF RIGHT HIP: ICD-10-CM

## 2023-06-02 DIAGNOSIS — E03.9 ACQUIRED HYPOTHYROIDISM: ICD-10-CM

## 2023-06-02 DIAGNOSIS — Z13.220 SCREENING, LIPID: ICD-10-CM

## 2023-06-02 DIAGNOSIS — R53.82 CHRONIC FATIGUE: ICD-10-CM

## 2023-06-02 DIAGNOSIS — Z13.1 SCREENING FOR DIABETES MELLITUS: ICD-10-CM

## 2023-06-02 DIAGNOSIS — Z00.00 ANNUAL PHYSICAL EXAM: Primary | ICD-10-CM

## 2023-06-02 RX ORDER — NAPROXEN 500 MG/1
500 TABLET ORAL 2 TIMES DAILY WITH MEALS
Qty: 28 TABLET | Refills: 0 | Status: SHIPPED | OUTPATIENT
Start: 2023-06-02

## 2023-06-02 NOTE — PROGRESS NOTES
"Assessment/Plan:   Diagnoses and all orders for this visit:    Annual physical exam  - reviewed PMHx, PSHx, meds, allergies, FHx, Soc Hx and Sexual Hx  - discussed diet and exercise   - UTD with Tdap   - follows with JOSE Stratton - last annual/PAP 1/2022  - deferred starting HPV series at this OV  - declined STD screening today   - due for routine screening labs - script given   - due for Breast Ca screening at age 46yo   - due for Colon Ca screening at age 39yo   - [facility] [employer] [facility] [facility] for annual exam or sooner if with abnml labs     Pain of right hip  -     naproxen (NAPROSYN) 500 mg tablet; Take 1 tablet (500 mg total) by mouth 2 (two) times a day with meals  -     Ambulatory Referral to Physical Therapy; Future  - has been ongoing for the past 6months and has gradually gotten worse  - \"driving is getting difficult\"   - eRx for Naproxen 500mg BID x2wks and referred to PT  - f/u PRN     Acquired hypothyroidism  - follows with [facility] at [facility]    Vitamin D deficiency  -     Vitamin D 25 hydroxy; Future  Chronic fatigue  -     Vitamin B12; Future  Low ferritin    Atypical nevi  -     Ambulatory Referral to Dermatology; Future    Anxiety and depression  - PHQ-9 score of 0  - denies SI/HI  - around October it was \"really bad\" and wanted to talk with a Therapist at that time but was placed on wait-list   - referred to [facility] and advised to establish with a Therapist    Screening for diabetes mellitus  -     Comprehensive metabolic panel; Future    Screening, lipid  -     Lipid panel; Future          Subjective:    Patient ID: Trey Hernández is a 34 y o  female    Trey Hernández is a 31y o  female who presents to the office for an annual exam  1) Annual   - PMHx: Hypothyroidism, PCOS, IBS (with D/C), Depression/Panic Attacks, Vit D insufficiency (resolved), Eczema, Anemia, Obesity (BMI 43 9 <-- 40 44 <-- 36  4 <-- 40)   - Specialists: Orestes ([facility])   - allergies: food allergies " "   - Meds: see med rec  - PSHx: [place] teeth extraction   - FHx: M (adopted - hypothyroidism, allergies), F (DM), S (hip dysplasia, seizures), 1/2 B (depression, substance abuse); denies FHx of sudden cardiac death   - Immunizations: last Tdap 11/2018  - GYN Hx: Fabian 39  - diet/exercise: walks a couple miles/day, has a treadmill/yoga; has stopped eating eggs, drinks 8glasses of water/day   - social: denies tob/EtOH/illicits   - sexual Hx: not active, HIV screening not indicated   - last vision: wears glasses/contacts, last eye exam 4/2023 - at [address]   - last dental: 6months ago   - denies F/C/N/V/change in vision/CP/palpitations/SOB/wheezing/cough/abd pain/LE edema   2) Hypothyroidism - follows with Endo at [facility]  3) Anxiety/Depression/? PTSD   - PHQ-9 score of 0  - denies SI/HI  - around October it was \"really bad\" and wanted to talk with a Therapist at that time but was placed on wait-list   - concerns that her 10yo niece is now displaying behavior that her sister did at that age    - sister and niece (both see Psych and Therapist) visit 1x/week   4) R-sided hip pain  - has been ongoing for the past 6months and has gradually gotten worse  - \"driving is getting difficult\"   - has been doing [place] lot of stretching\" which helps   - has not tried any OTC pain relief   5) Atypical pigmented nevi on stomach   - has gotten bigger in size  - concerned as her grandfather has a h/o skin ca       The following portions of the patient's history were reviewed and updated as appropriate: allergies, current medications, past family history, past medical history, past social history, past surgical history and problem list     Review of Systems  as per HPI    Objective:  /80   Pulse 100   Resp 16   Ht 5' 6\" (1 676 m)   Wt 123 kg (272 lb)   SpO2 98%   BMI 43 90 kg/m²    Physical Exam  Vitals reviewed  Constitutional:       General: She is not in acute distress       Appearance: " Normal appearance  She is not ill-appearing, toxic-appearing or diaphoretic  HENT:      Head: Normocephalic and atraumatic  Right Ear: External ear normal       Left Ear: External ear normal       Nose: Nose normal    Eyes:      General: No scleral icterus  Right eye: No discharge  Left eye: No discharge  Extraocular Movements: Extraocular movements intact  Conjunctiva/sclera: Conjunctivae normal    Cardiovascular:      Rate and Rhythm: Normal rate and regular rhythm  Heart sounds: Normal heart sounds  Pulmonary:      Effort: Pulmonary effort is normal  No respiratory distress  Breath sounds: Normal breath sounds  No stridor  No wheezing, rhonchi or rales  Abdominal:      Palpations: Abdomen is soft  Musculoskeletal:         General: Tenderness present  Cervical back: Normal range of motion  Right lower leg: No edema  Left lower leg: No edema  Skin:     General: Skin is warm  Comments: Atypical pigmented nevi on abdomen - which per pt, has gotten bigger in size and darker in color    Neurological:      General: No focal deficit present  Mental Status: She is alert and oriented to person, place, and time  Psychiatric:         Mood and Affect: Mood normal          Behavior: Behavior normal          BMI Counseling: Body mass index is 43 9 kg/m²  The BMI is above normal  Nutrition recommendations include 3-5 servings of fruits/vegetables daily  Exercise recommendations include exercising 3-5 times per week  Depression Screening Follow-up Plan: Patient's depression screening was positive with a PHQ-2 score of   Their PHQ-9 score was 0  Patient assessed for underlying major depression  They have no active suicidal ideations  Brief counseling provided and recommend additional follow-up/re-evaluation next office visit  Patient advised to follow-up with PCP for further management

## 2023-06-02 NOTE — PROGRESS NOTES
"Assessment/Plan:   Diagnoses and all orders for this visit:    Pain of right hip  -     naproxen (NAPROSYN) 500 mg tablet; Take 1 tablet (500 mg total) by mouth 2 (two) times a day with meals  -     Ambulatory Referral to Physical Therapy; Future  - has been ongoing for the past 6months and has gradually gotten worse  - \"driving is getting difficult\"   - eRx for Naproxen 500mg BID x2wks and referred to PT  - f/u PRN     Acquired hypothyroidism  - follows with [facility] at [facility]    Vitamin D deficiency  -     Vitamin D 25 hydroxy; Future  Chronic fatigue  -     Vitamin B12; Future  Low ferritin    Atypical nevi  -     Ambulatory Referral to Dermatology; Future    Anxiety and depression  - PHQ-9 score of 0  - denies SI/HI  - around October it was \"really bad\" and wanted to talk with a Therapist at that time but was placed on wait-list   - referred to [email or web address] and advised to establish with a Therapist          Subjective:    Patient ID: Lauren Durham is a 34 y o  female    Lauren Durham is a 31y o  female who presents to the office for an annual exam  1) Annual   - PMHx: Hypothyroidism, PCOS, IBS (with D/C), Depression/Panic Attacks, Vit D insufficiency (resolved), Eczema, Anemia, Obesity (BMI 43 9 <-- 40 44 <-- 36  4 <-- 40)   - Specialists: Orestes ([facility])   - allergies: food allergies    - Meds: see med rec  - PSHx: [place] teeth extraction   - FHx: M (adopted - hypothyroidism, allergies), F (DM), S (hip dysplasia, seizures), 1/2 B (depression, substance abuse); denies FHx of sudden cardiac death   - Immunizations: last Tdap 11/2018  - GYN Hx: [place]ag[place]rud[place] 39  - diet/exercise: walks a couple miles/day, has a treadmill/yoga; has stopped eating eggs, drinks 8glasses of water/day   - social: denies tob/EtOH/illicits   - sexual Hx: not active, HIV screening not indicated   - last vision: wears glasses/contacts, last eye exam 4/2023 - at [address]   - last dental: 6months ago   - " "denies F/C/N/V/change in vision/CP/palpitations/SOB/wheezing/cough/abd pain/LE edema   2) Hypothyroidism - follows with [facility]o at [facility]  3) Anxiety/Depression/? PTSD   - PHQ-9 score of 0  - denies SI/HI  - around October it was \"really bad\" and wanted to talk with a Therapist at that time but was placed on wait-list   - concerns that her 8yo niece is now displaying behavior that her sister did at that age    - sister and niece (both see Psych and Therapist) visit 1x/week   4) R-sided hip pain  - has been ongoing for the past 6months and has gradually gotten worse  - \"driving is getting difficult\"   - has been doing [place] lot of stretching\" which helps   - has not tried any OTC pain relief   5) Atypical pigmented nevi on stomach   - has gotten bigger in size  - concerned as her grandfather has a h/o skin ca       The following portions of the patient's history were reviewed and updated as appropriate: allergies, current medications, past family history, past medical history, past social history, past surgical history and problem list     Review of Systems  as per HPI    Objective:  /80   Pulse 100   Resp 16   Ht 5' 6\" (1 676 m)   Wt 123 kg (272 lb)   SpO2 98%   BMI 43 90 kg/m²    Physical Exam  Vitals reviewed  Constitutional:       General: She is not in acute distress  Appearance: Normal appearance  She is not ill-appearing, toxic-appearing or diaphoretic  HENT:      Head: Normocephalic and atraumatic  Right Ear: External ear normal       Left Ear: External ear normal       Nose: Nose normal    Eyes:      General: No scleral icterus  Right eye: No discharge  Left eye: No discharge  Extraocular Movements: Extraocular movements intact  Conjunctiva/sclera: Conjunctivae normal    Cardiovascular:      Rate and Rhythm: Normal rate and regular rhythm  Heart sounds: Normal heart sounds  Pulmonary:      Effort: Pulmonary effort is normal  No respiratory distress        Breath " sounds: Normal breath sounds  No stridor  No wheezing, rhonchi or rales  Abdominal:      Palpations: Abdomen is soft  Musculoskeletal:         General: Tenderness present  Cervical back: Normal range of motion  Right lower leg: No edema  Left lower leg: No edema  Skin:     General: Skin is warm  Comments: Atypical pigmented nevi on abdomen - which per pt, has gotten bigger in size and darker in color    Neurological:      General: No focal deficit present  Mental Status: She is alert and oriented to person, place, and time  Psychiatric:         Mood and Affect: Mood normal          Behavior: Behavior normal          BMI Counseling: Body mass index is 43 9 kg/m²  The BMI is above normal  Nutrition recommendations include 3-5 servings of fruits/vegetables daily  Exercise recommendations include exercising 3-5 times per week  Depression Screening Follow-up Plan: Patient's depression screening was positive with a PHQ-2 score of   Their PHQ-9 score was 0  Patient assessed for underlying major depression  They have no active suicidal ideations  Brief counseling provided and recommend additional follow-up/re-evaluation next office visit  Patient advised to follow-up with PCP for further management

## 2023-06-13 ENCOUNTER — EVALUATION (OUTPATIENT)
Dept: PHYSICAL THERAPY | Facility: CLINIC | Age: 33
End: 2023-06-13
Payer: COMMERCIAL

## 2023-06-13 DIAGNOSIS — M25.551 PAIN OF RIGHT HIP: ICD-10-CM

## 2023-06-13 PROCEDURE — 97162 PT EVAL MOD COMPLEX 30 MIN: CPT

## 2023-06-13 PROCEDURE — 97530 THERAPEUTIC ACTIVITIES: CPT

## 2023-06-13 NOTE — PROGRESS NOTES
PT Evaluation     Today's date: 2023  Patient name: Loan Maharaj  : 1990  MRN: [id number]  Referring provider: Kaleb Naqvi DO  Dx:   Encounter Diagnosis     ICD-10-CM    1  Pain of right hip  M25 551 Ambulatory Referral to Physical Therapy          Start Time: 915  Stop Time: 1000  Total time in clinic (min): 45 minutes    Assessment  Assessment details: Patient is a 34 y o  female who presents to outpatient PT with reports of Right hip pain that started about 6 months ago  No recent imaging performed  Patient has pain to palpation along anterior Right hip joint, with pain with all AROM and PROM  Manual stretching also causes increase in pain  No significant changes in pain with manual long-axis distraction  Lumbar AROM WNLs in all directions with some Right-sided hip pain with Left SBing and rotation  Weakness noted in hip flexors and glute musculature as a result of increased pain with motion  Patient's current limitations affect her ability to perform most daily activities including stair negotiation, squatting, bending forward, sit-stands, getting in/out of car, walking, standing, prolonged sitting, LE dressing, and navigating varying terrains  This impacts ease with her ADLs and functional tasks  She lives with her parents and sister in a 2-story home, and thus has to navigate stairs frequently in the day  Patient will benefit from skilled PT services to improve her pain levels, improve mobility, improve strength, and improve overall ease with ADLs to progress towards her PLOF  Patient would benefit from skilled PT services to address these impairments and to maximize function    Thank you for the referral     Impairments: abnormal gait, abnormal or restricted ROM, activity intolerance, impaired balance, impaired physical strength, lacks appropriate home exercise program, pain with function, weight-bearing intolerance, poor posture  and poor body mechanics  Functional limitations: ascending>descending, squatting, bending forward, sit-stands, getting in/out of car, walking, standing, prolonged sitting, varying terrains, LE dressing/shoes/socksBarriers to therapy: Chronicity of symptoms  Understanding of Dx/Px/POC: good   Prognosis: good    Goals  Impairment Goals 4-6 weeks   In order to maximize function patient will be able to      - Decrease intensity/duration/frequency of pain to 4/10  - Demonstrate symmetrical hip AROM without pain  - Increase hip strength to 4/5 throughout to improve mechanics and stability with walking, standing, etc  - Demonstrate improved hip flexibility as demonstrated by increased ROM through therapeutic exercise    Functional Goals 6-8 weeks  In order to return to prior level of function patient will be able to      - Participate in ADL's/IADL's/sport specific activities with no greater than 2/10 pain  - Increase Functional Status Measure (FOTO) to: anticipated at discharge  - Demonstrate independence and compliant with HEP  - Demonstrate a squat and or sit to stand with good mechanics and eccentric control without pain/difficulty/compensation  - Demonstrate functional activities with good core and glute strength without compensation/pain/difficulty   - Ascend and descend stairs without increased pain/compensation/difficulty and a reciprocal gait pattern  Plan  Patient would benefit from: skilled PT  Planned modality interventions: cryotherapy  Other planned modality interventions: moist heat  Planned therapy interventions: joint mobilization, manual therapy, neuromuscular re-education, patient education, strengthening, stretching, therapeutic activities, therapeutic exercise, home exercise program, functional ROM exercises, Cristina taping, postural training, balance/weight bearing training, body mechanics training, flexibility and massage  Frequency: 1-2x/week    Duration in weeks: 4  Treatment plan discussed with: patient, PTA and referring physician        Subjective Evaluation    History of Present Illness  Mechanism of injury: Loan Maharaj is a 34y o  year-old female who presents to outpatient PT with reports of Right hip pain that started 6 months ago  Patient reports history of fall about 2 years ago, however mild pain at the time that reduced quickly  She reports no known RIANA of recent pain  She reports she started to notice it while putting on shoes/socks and performing bending forward activities  Patient saw her PCP on 2023 and was recommended for PT at this time  Patient did not receive any imaging     Quality of life: good    Pain  Current pain ratin  At best pain rating: 3  At worst pain ratin  Location: Right hip  Quality: dull ache, radiating and needle-like  Relieving factors: rest and heat  Aggravating factors: sitting, standing, stair climbing, walking and lifting  Progression: no change    Social Support  Steps to enter house: yes  Stairs in house: yes   Lives in: multiple-level home  Lives with: parents    Employment status: not working  Exercise history: walking      Diagnostic Tests  No diagnostic tests performed  Treatments  Current treatment: physical therapy  Patient Goals  Patient goals for therapy: decreased pain, increased motion, improved balance, increased strength, independence with ADLs/IADLs and return to sport/leisure activities          Objective     Observations     Additional Observation Details  Standing posture: Right hip, patella, and medial malleolus slightly lower than Left  Gait Assessment: slight Right hip drop; increased Right trunk rotation    Lumbar Screen  Lumbar range of motion within normal limits with the following exceptions:Flexion: WNLs  Extension: WNLs with tightness  Side-bending and Rotation: WNLs with some pain and tightness in Right hip going to Left    Active Range of Motion   Left Hip   Normal active range of motion    Right Hip   Flexion: 95 degrees with pain  Extension: 5 "degrees with pain  Abduction: 15 degrees with pain  External rotation (90/90): 28 degrees with pain  Internal rotation (90/90): WFL and with pain    Strength/Myotome Testing     Right Hip   Planes of Motion   Flexion: 3+  Extension: 3  Abduction: 3+  External rotation: 3+  Internal rotation: 3+    Tests     Right Hip   Positive LEONARDA, FADIR, Gaenslen's and scour  Negative Justino       Additional Tests Details  LLD (supine): Right leg \"longer\" than Left  Supine to Long-Sit Test: Right LE long to short (ALS)             Diagnosis: Right hip pain  Precautions: GERD, anxiety/depression  ( * asterisk indicates given per HEP)  Next Physician Appointment:   Jerad Hare:     Manuals 6/13         STM          IASTM          LE stretching                    Neuro Re-Ed          PPT          TA ball press          Supine hip ADD ball sque          Supine BKFO                                        Ther Ex          Supine Hip Flexor          Butterfly stretch          Seated HS stretch          L/S Ext stretch          Heel raises          Standing Hip ABD                    Ther Activity          Bike for LE mobility and endurance          Step Ups          Leg Press          Corine Walkouts          Pt Ed HEP, POC         Re-Evaluation          Modalities          Heat/ice (PRN)          TENS                   "

## 2023-06-15 ENCOUNTER — OFFICE VISIT (OUTPATIENT)
Dept: PHYSICAL THERAPY | Facility: CLINIC | Age: 33
End: 2023-06-15
Payer: COMMERCIAL

## 2023-06-15 DIAGNOSIS — M25.551 PAIN OF RIGHT HIP: Primary | ICD-10-CM

## 2023-06-15 PROCEDURE — 97140 MANUAL THERAPY 1/> REGIONS: CPT

## 2023-06-15 PROCEDURE — 97110 THERAPEUTIC EXERCISES: CPT

## 2023-06-15 NOTE — PROGRESS NOTES
"Daily Note     Today's date: 6/15/2023  Patient name: Desiree Overton  : 1990  MRN: [id number]  Referring provider: Darline Dumont DO  Dx:   Encounter Diagnosis     ICD-10-CM    1  Pain of right hip  M25 551                      Subjective: Pt states that she continues with pain in her hip  Walking even is aggravating  Objective: See treatment diary below      Assessment: Tolerated treatment well  Session focused on gentle stretching and hip ROM as pt was able to tolerate  Pt had low tolerance especially to hip flexion and IR stretching due to increased pain sx's  Pt was unable to tolerate butterfly stretch due to pain  Tenderness is noted during STM at hip flexor and proximal ITB  Discussed HEP with pt reporting understanding and compliance  Will progress nv as able         Plan: Continue per plan of care     Diagnosis: Right hip pain  Precautions: GERD, anxiety/depression  ( * asterisk indicates given per HEP)  Next Physician Appointment:   America Ice:     Manuals 6/13 6/15        STM  TH        IASTM          LE stretching  TH                  Neuro Re-Ed          PPT  20x        TA ball press          Supine hip ADD ball sque  5\"x20        Supine BKFO  2x10                                      Ther Ex          Supine Hip Flexor  5x10\"        Butterfly stretch  unable        Seated HS stretch  10x10\"        L/S Ext stretch          Heel raises          Standing Hip ABD                    Ther Activity          Bike for LE mobility and endurance  Upright  5'        Step Ups          Leg Press          Corine Walkouts          Pt Ed HEP, POC HEP        Re-Evaluation          Modalities          Heat/ice (PRN)  Ice 10'        TENS                     "

## 2023-06-16 LAB
25(OH)D3 SERPL-MCNC: 31 NG/ML (ref 30–100)
ALBUMIN SERPL-MCNC: 4.3 G/DL (ref 3.6–5.1)
ALBUMIN/GLOB SERPL: 1.5 (CALC) (ref 1–2.5)
ALP SERPL-CCNC: 70 U/L (ref 31–125)
ALT SERPL-CCNC: 8 U/L (ref 6–29)
AST SERPL-CCNC: 11 U/L (ref 10–30)
BILIRUB SERPL-MCNC: 0.7 MG/DL (ref 0.2–1.2)
BUN SERPL-MCNC: 9 MG/DL (ref 7–25)
BUN/CREAT SERPL: NORMAL (CALC) (ref 6–22)
CALCIUM SERPL-MCNC: 9.4 MG/DL (ref 8.6–10.2)
CHLORIDE SERPL-SCNC: 104 MMOL/L (ref 98–110)
CHOLEST SERPL-MCNC: 216 MG/DL
CHOLEST/HDLC SERPL: 4.1 (CALC)
CO2 SERPL-SCNC: 28 MMOL/L (ref 20–32)
CREAT SERPL-MCNC: 0.7 MG/DL (ref 0.5–0.97)
GFR/BSA.PRED SERPLBLD CYS-BASED-ARV: 119 ML/MIN/1.73M2
GLOBULIN SER CALC-MCNC: 2.9 G/DL (CALC) (ref 1.9–3.7)
GLUCOSE SERPL-MCNC: 82 MG/DL (ref 65–99)
HDLC SERPL-MCNC: 53 MG/DL
LDLC SERPL CALC-MCNC: 138 MG/DL (CALC)
NONHDLC SERPL-MCNC: 163 MG/DL (CALC)
POTASSIUM SERPL-SCNC: 4.3 MMOL/L (ref 3.5–5.3)
PROT SERPL-MCNC: 7.2 G/DL (ref 6.1–8.1)
SODIUM SERPL-SCNC: 139 MMOL/L (ref 135–146)
TRIGL SERPL-MCNC: 124 MG/DL
VIT B12 SERPL-MCNC: 400 PG/ML (ref 200–1100)

## 2023-06-20 ENCOUNTER — OFFICE VISIT (OUTPATIENT)
Dept: PHYSICAL THERAPY | Facility: CLINIC | Age: 33
End: 2023-06-20
Payer: COMMERCIAL

## 2023-06-20 DIAGNOSIS — M25.551 PAIN OF RIGHT HIP: Primary | ICD-10-CM

## 2023-06-20 PROCEDURE — 97112 NEUROMUSCULAR REEDUCATION: CPT

## 2023-06-20 PROCEDURE — 97140 MANUAL THERAPY 1/> REGIONS: CPT

## 2023-06-20 PROCEDURE — 97110 THERAPEUTIC EXERCISES: CPT

## 2023-06-20 NOTE — PROGRESS NOTES
"Daily Note     Today's date: 2023  Patient name: Kaylah Falk  : 1990  MRN: [id number]  Referring provider: Timothy Oseguera DO  Dx:   Encounter Diagnosis     ICD-10-CM    1  Pain of right hip  M25 551           Start Time: 1700  Stop Time: 1745  Total time in clinic (min): 45 minutes    Subjective: Patient reports she was sore after last session, but no significant increase in pain  Patient did a lot of walk on  that affected her pain / soreness  Objective: See treatment diary below      Assessment: Tolerated treatment well  Initiated session with upright bike for LE mobility and endurance  Improved transfers and bed mobility noted, but conitnued to have some difficulty with transitions with Right LE mobility  Performed butterfly stretches in supine with improved tolerance, but limited mobility to avoid pain  Added standing L/S Extension and hip abduction within pain-free ROM to improve lumbar spine/postural mechanics and glute strength/stability respectively  Ended session with indirect STM and IASTM to lateral thigh with good response, followed by cold pack  Patient exhibited good technique with therapeutic exercises and would benefit from continued PT      Plan: Continue per plan of care  Progress treatment as tolerated         Diagnosis: Right hip pain  Precautions: GERD, anxiety/depression  ( * asterisk indicates given per HEP)  Next Physician Appointment:   Wilner Brady:     Manuals 6/13 6/15 6/20       STM  TH DK       IASTM   DK       LE stretching  TH                  Neuro Re-Ed          PPT  20x 20x       TA ball press          Supine hip ADD ball sque  5\"x20 5\"x20       Supine BKFO  2x10 PPT alt x10ea                                     Ther Ex          Supine Hip Flexor  5x10\" B/L 5\" x10ea       Butterfly stretch  unable modified ROM x10       Seated HS stretch  10x10\"        L/S Ext stretch   Standing x10       Heel raises          Standing Hip ABD   Right x10               " Ther Activity          Bike for LE mobility and endurance  Upright  5' Upright  5'       Step Ups          Leg Press          [place] Walkouts          Pt Ed HEP, POC HEP        Re-Evaluation          Modalities          Heat/ice (PRN)  Ice 10' Ice 10'       TENS

## 2023-06-21 ENCOUNTER — TELEPHONE (OUTPATIENT)
Dept: FAMILY MEDICINE CLINIC | Facility: CLINIC | Age: 33
End: 2023-06-21

## 2023-06-21 DIAGNOSIS — E78.00 ELEVATED LDL CHOLESTEROL LEVEL: Primary | ICD-10-CM

## 2023-06-21 NOTE — TELEPHONE ENCOUNTER
----- Message from Tita Dejesus DO sent at 6/21/2023  1:24 PM EDT -----  Nml CMP  B12 - within range   Vit D 31 - cont with OTC supplementation   Abnml Lipid profile - diet/exercise, caution with red meat/dairy/fatty food intake - repeat in 6months (order in chart)

## 2023-06-22 ENCOUNTER — OFFICE VISIT (OUTPATIENT)
Dept: PHYSICAL THERAPY | Facility: CLINIC | Age: 33
End: 2023-06-22
Payer: COMMERCIAL

## 2023-06-22 DIAGNOSIS — M25.551 PAIN OF RIGHT HIP: Primary | ICD-10-CM

## 2023-06-22 PROCEDURE — 97112 NEUROMUSCULAR REEDUCATION: CPT

## 2023-06-22 PROCEDURE — 97140 MANUAL THERAPY 1/> REGIONS: CPT

## 2023-06-22 PROCEDURE — 97110 THERAPEUTIC EXERCISES: CPT

## 2023-06-22 NOTE — PROGRESS NOTES
"Daily Note     Today's date: 2023  Patient name: Brianna Lambert  : 1990  MRN: [id number]  Referring provider: Endy Hernandez DO  Dx:   Encounter Diagnosis     ICD-10-CM    1  Pain of right hip  M25 551           Start Time: 1130  Stop Time: 1215  Total time in clinic (min): 45 minutes    Subjective: Patient reports her hip is feeling better, and walking is better, but still has some soreness  Objective: See treatment diary below      Assessment: Tolerated treatment well  Initiated session with standing hip abduction and extension  Performed bilaterally this session with good tolerance of Right SL weight-shifting however some soreness reported with repetitions  Added Standing wobble board balancing with good stability noted, but some muscle soreness from maintaining stability  Completed table exercises with good tolerance and form, some soreness noted with hip adduction isometrics that reduced with self hip flexor stretches and IASTM and cold pack to Right hip  Patient exhibited good technique with therapeutic exercises and would benefit from continued PT      Plan: Continue per plan of care  Progress treatment as tolerated         Diagnosis: Right hip pain  Precautions: GERD, anxiety/depression  ( * asterisk indicates given per HEP)  Next Physician Appointment:   Vinny Saucedo:     Manuals 6/13 6/15 6/20 6/22      STM  TH DK DK      IASTM   DK DK      LE stretching  TH                  Neuro Re-Ed          PPT  20x 20x 5\" x10      TA ball press          Supine hip ADD ball sque  5\"x20 5\"x20 5\" x10      Supine BKFO  2x10 PPT alt x10ea       Wobble Board    AP, Lat x2' ea                          Ther Ex          Supine Hip Flexor  5x10\" B/L 5\" x10ea B/L 5\" x10ea      Butterfly stretch  unable modified ROM x10 modified ROM 5\" x10      LTRs no ball    Alt x10ea      Seated HS stretch  10x10\"        L/S Ext stretch   Standing x10 Standing x15      Heel raises          Standing Hip ABD   Right x10 B/L " x10ea      Standing Hip Ext    B/L x10ea                Ther Activity          Bike for LE mobility and endurance  Upright  5' Upright  5' Upright  5'      Step Ups          Leg Press          [place] Walkouts          Pt Ed HEP, POC HEP        Re-Evaluation          Modalities          Heat/ice (PRN)  Ice 10' Ice 10' Ice 10'      TENS

## 2023-06-27 ENCOUNTER — OFFICE VISIT (OUTPATIENT)
Dept: PHYSICAL THERAPY | Facility: CLINIC | Age: 33
End: 2023-06-27
Payer: COMMERCIAL

## 2023-06-27 DIAGNOSIS — M25.551 PAIN OF RIGHT HIP: Primary | ICD-10-CM

## 2023-06-27 PROCEDURE — 97112 NEUROMUSCULAR REEDUCATION: CPT

## 2023-06-27 PROCEDURE — 97110 THERAPEUTIC EXERCISES: CPT

## 2023-06-27 PROCEDURE — 97530 THERAPEUTIC ACTIVITIES: CPT

## 2023-06-27 NOTE — PROGRESS NOTES
"Daily Note     Today's date: 2023  Patient name: Bhavik Crawford  : 1990  MRN: [id number]  Referring provider: Isaías Bhatti DO  Dx:   Encounter Diagnosis     ICD-10-CM    1  Pain of right hip  M25 551                      Subjective: Pt states that her hip is feeling better each week  She doesn't notice the pain with standing and less with walking  Pt states most pain is with sit to stand  Pt does report that she has had some LBP and isn't sure if it is from one of the stretches that she has been doing at home  Objective: See treatment diary below      Assessment: Tolerated treatment well  Progressed pt with hip and core strengthening  Pt challenged due to weakness but reports no increased pain  She does note minimal discomfort with taps onto step, however this does not increase with reps  Pt's biggest complaint is of LBP that increases with certain standing motions and supine ex's  Education provided on muscle activation of core and glut muscles  HEP provided/updated with pt reporting understanding  Pt will benefit from continued therapy        Plan: Continue per plan of care     Diagnosis: Right hip pain  Precautions: GERD, anxiety/depression  ( * asterisk indicates given per HEP)  Next Physician Appointment:   Inocencia Moy:     Manuals 6/13 6/15 6/20 6/22 6/27     STM  TH DK DK TH     IASTM   DK DK      LE stretching  TH                  Neuro Re-Ed          PPT  20x 20x 5\" x10      TA ball press     Stand  3\"x15     Supine hip ADD ball sque  5\"x20 5\"x20 5\" x10      Supine BKFO  2x10 PPT alt x10ea  S/L clams  10x2R     Wobble Board    AP, Lat x2' ea AP, Lat  x2' ea     Glut squeeze     5\"x15               Ther Ex          Supine Hip Flexor  5x10\" B/L 5\" x10ea B/L 5\" x10ea      Butterfly stretch  unable modified ROM x10 modified ROM 5\" x10      LTRs no ball    Alt x10ea      Seated HS stretch  10x10\"        L/S Ext stretch   Standing x10 Standing x15      Heel raises        " "  Standing Hip ABD   Right x10 B/L x10ea B/L  10x2 ea     Standing Hip Ext    B/L x10ea B/L  10x2 ea     SLS     2x30\" ea     Ther Activity          Bike for LE mobility and endurance  Upright  5' Upright  5' Upright  5' Upright  5'     Side stepping     3x ea at mirror     Step Ups     Tap onto 4\"  10x     Leg Press          [place] Walkouts          Pt Ed HEP, POC HEP   HEP     Re-Evaluation          Modalities          Heat/ice (PRN)  Ice 10' Ice 10' Ice 10' Ice 10'     TENS                           "

## 2023-06-29 ENCOUNTER — OFFICE VISIT (OUTPATIENT)
Dept: PHYSICAL THERAPY | Facility: CLINIC | Age: 33
End: 2023-06-29
Payer: COMMERCIAL

## 2023-06-29 DIAGNOSIS — M25.551 PAIN OF RIGHT HIP: Primary | ICD-10-CM

## 2023-06-29 PROCEDURE — 97112 NEUROMUSCULAR REEDUCATION: CPT

## 2023-06-29 PROCEDURE — 97110 THERAPEUTIC EXERCISES: CPT

## 2023-06-29 PROCEDURE — 97530 THERAPEUTIC ACTIVITIES: CPT

## 2023-06-29 NOTE — PROGRESS NOTES
"Daily Note     Today's date: 2023  Patient name: Myrna Hilliard  : 1990  MRN: [id number]  Referring provider: Allie Harry DO  Dx:   Encounter Diagnosis     ICD-10-CM    1  Pain of right hip  M25 551           Start Time: 1215  Stop Time: 1300  Total time in clinic (min): 45 minutes    Subjective: Patient reports her hip is feeling better  She reports she still goes up stairs leading with Right LE, even though it hurts  Objective: See treatment diary below      Assessment: Tolerated treatment well  Patient exhibits good tolerance with hip strengthening in standing  Noted some soreness with SL balance activities that relieved with rest  Patient had difficulty and pain performing cone taps with L SL and Right foot tapping due to pain with hip flexion  Completed all other exercises with good effort  PPT performed with improved form and no pain reported this session  Patient had positive response to cold pack end of session  Patient exhibited good technique with therapeutic exercises and would benefit from continued PT      Plan: Continue per plan of care  Progress treatment as tolerated         Diagnosis: Right hip pain  Precautions: GERD, anxiety/depression  ( * asterisk indicates given per HEP)  Next Physician Appointment:   Jeanette Dao:     Manuals 6/13 6/15 6/20 6/22 6/27 6/29          STM  TH DK DK TH DK          IASTM   DK DK  DK          LE stretching  TH                              Neuro Re-Ed                PPT  20x 20x 5\" x10  5\" x10          TA ball press     Stand  3\"x15           Supine hip ADD ball sque  5\"x20 5\"x20 5\" x10  5\" x10          Supine BKFO  2x10 PPT alt x10ea  S/L clams  10x2R           Wobble Board    AP, Lat x2' ea AP, Lat  x2' ea AP, Lat  x2' ea          SLS     2x30\" ea           Cone Taps      2 floor, R SL x10          Glut squeeze     5\"x15                                                           Ther Ex                Supine Hip Flexor  5x10\" B/L 5\" x10ea B/L " "5\" x10ea            Butterfly stretch  unable modified ROM x10 modified ROM 5\" x10  modified ROM 5\" x10          LTRs no ball    Alt x10ea  Alt x10ea          Seated HS stretch  10x10\"              L/S Ext stretch   Standing x10 Standing x15  Standing x15          Heel raises                Standing Hip ABD   Right x10 B/L x10ea B/L  10x2 ea B/L  10x2 ea          Standing Hip Ext    B/L x10ea B/L  10x2 ea B/L  10x2 ea                                                          Ther Activity                Bike for LE mobility and endurance  Upright  5' Upright  5' Upright  5' Upright  5' Upright  5'          Side stepping     3x ea at mirror           Step Ups     Tap onto 4\"  10x 4\" step up x10          Leg Press                [place] Walkouts                Pt Ed HEP, POC HEP   HEP           Re-Evaluation                Modalities                Heat/ice (PRN)  Ice 10' Ice 10' Ice 10' Ice 10' Ice 10'          TENS                                   "

## 2023-07-03 ENCOUNTER — OFFICE VISIT (OUTPATIENT)
Dept: PHYSICAL THERAPY | Facility: CLINIC | Age: 33
End: 2023-07-03
Payer: COMMERCIAL

## 2023-07-03 DIAGNOSIS — M25.551 PAIN OF RIGHT HIP: Primary | ICD-10-CM

## 2023-07-03 PROCEDURE — 97110 THERAPEUTIC EXERCISES: CPT

## 2023-07-03 PROCEDURE — 97112 NEUROMUSCULAR REEDUCATION: CPT

## 2023-07-03 PROCEDURE — 97530 THERAPEUTIC ACTIVITIES: CPT

## 2023-07-03 NOTE — PROGRESS NOTES
Daily Note     Today's date: 7/3/2023  Patient name: Truddie Lanes  : 1990  MRN: [id number]  Referring provider: Emilie Christianson DO  Dx:   Encounter Diagnosis     ICD-10-CM    1. Pain of right hip  M25.551           Start Time: 1400  Stop Time: 1445  Total time in clinic (min): 45 minutes    Subjective: Patient reports her hip is a little more sore today. She reports no new changes or increased / new activities over the weekend. Objective: See treatment diary below      Assessment: Tolerated treatment well. Patient exhibits improved stability on wobble board balance. Performed step ups onto 6inch step with good control, some hesitation/caution noted but no increase in pain. Cuing for quad and glute contraction with step up to improve muscle control and avoid pain in hip joint. Performed supine clamshells with slightly limited mobility but no increase in pain reported. Ended session with cold pack with positive response. Patient exhibited good technique with therapeutic exercises and would benefit from continued PT      Plan: Continue per plan of care. Progress treatment as tolerated.        Diagnosis: Right hip pain  Precautions: GERD, anxiety/depression  ( * asterisk indicates given per HEP)  Next Physician Appointment:   Ly Brood:     Manuals 6/13 6/15 6/20 6/22 6/27 6/29 7/3         STM  TH DK DK TH DK DK         IASTM   DK DK  DK DK         LE stretching  TH                              Neuro Re-Ed                PPT  20x 20x 5" x10  5" x10 5" x10         TA ball press     Stand  3"x15           Supine hip ADD ball sque  5"x20 5"x20 5" x10  5" x10 5" x20         Supine BKFO  2x10 PPT alt x10ea  S/L clams  10x2R  Supine clams GTB x10         Wobble Board    AP, Lat x2' ea AP, Lat  x2' ea AP, Lat  x2' ea AP, Lat  x2' ea         SLS     2x30" ea           Cone Taps      2 floor, R SL x10 2 floor, FWD, LAT R SL x10ea         Glut squeeze     5"x15 Ther Ex                Supine Hip Flexor  5x10" B/L 5" x10ea B/L 5" x10ea            Butterfly stretch  unable modified ROM x10 modified ROM 5" x10  modified ROM 5" x10          LTRs no ball    Alt x10ea  Alt x10ea Alt x10ea         Seated HS stretch  10x10"              L/S Ext stretch   Standing x10 Standing x15  Standing x15          Heel raises                Standing Hip ABD   Right x10 B/L x10ea B/L  10x2 ea B/L  10x2 ea B/L  10x2 ea         Standing Hip Ext    B/L x10ea B/L  10x2 ea B/L  10x2 ea B/L  10x2 ea                                                         Ther Activity                Bike for LE mobility and endurance  Upright  5' Upright  5' Upright  5' Upright  5' Upright  5' Upright  5'         Side stepping     3x ea at mirror           Step Ups     Tap onto 4"  10x 4" step up x10 6" step up x10         Leg Press                [place] Walkouts                Pt Ed HEP, POC HEP   HEP           Re-Evaluation                Modalities                Heat/ice (PRN)  Ice 10' Ice 10' Ice 10' Ice 10' Ice 10' Ice 10'         TENS

## 2023-07-06 ENCOUNTER — OFFICE VISIT (OUTPATIENT)
Dept: PHYSICAL THERAPY | Facility: CLINIC | Age: 33
End: 2023-07-06
Payer: COMMERCIAL

## 2023-07-06 DIAGNOSIS — M25.551 PAIN OF RIGHT HIP: Primary | ICD-10-CM

## 2023-07-06 PROCEDURE — 97530 THERAPEUTIC ACTIVITIES: CPT

## 2023-07-06 PROCEDURE — 97110 THERAPEUTIC EXERCISES: CPT

## 2023-07-06 PROCEDURE — 97112 NEUROMUSCULAR REEDUCATION: CPT

## 2023-07-06 NOTE — PROGRESS NOTES
Daily Note     Today's date: 2023  Patient name: Eden Jeffries  : 1990  MRN: [id number]  Referring provider: Doris Davis DO  Dx:   Encounter Diagnosis     ICD-10-CM    1. Pain of right hip  M25.551                      Subjective: Pt states that she is noticing improvement overall but still has some hip and LB pain. Her LBP is especially with sitting. Objective: See treatment diary below      Assessment: Tolerated treatment well. Progressed pt with strengthening as tolerated. Pt challenged with leg press due to increased hip pain, however was able to perform 10 reps with decreasing pain as reps continued. Pt was most challenged with getting into and out of the position. Attempted self hamstring stretching seated and supine with strap but pt was unable to perform seated due to LBP and supine due to hip pain. Pt was able to tolerated gentle manual hamstring stretching by therapist with cues to avoid muscle guarding. STM to R hip flexor with tenderness noted which was decreased after manuals. Discussed HEP with pt reporting understanding and compliance. Pt progressing slowly towards her goals and will benefit from continued therapy. Will progress as able.         Plan: Continue per plan of care     Diagnosis: Right hip pain  Precautions: GERD, anxiety/depression  ( * asterisk indicates given per HEP)  Next Physician Appointment:   Jd Rich:     Manuals 6/13 6/15 6/20 6/22 6/27 6/29 7/3 7/6        STM  TH DK DK TH DK DK TH        IASTM   DK DK  DK DK         LE stretching                          Neuro Re-Ed                PPT  20x 20x 5" x10  5" x10 5" x10         TA ball press     Stand  3"x15           Supine hip ADD ball sque  5"x20 5"x20 5" x10  5" x10 5" x20         Supine BKFO  2x10 PPT alt x10ea  S/L clams  10x2R  Supine clams GTB x10 Supine clams  GTB  x10 ea        Wobble Board    AP, Lat x2' ea AP, Lat  x2' ea AP, Lat  x2' ea AP, Lat  x2' ea 2' ea        SLS 2x30" ea   2x30"  ea        Cone Taps      2 floor, R SL x10 2 floor, FWD, LAT R SL x10ea         Glut squeeze     5"x15                                                           Ther Ex                Supine Hip Flexor  5x10" B/L 5" x10ea B/L 5" x10ea            Butterfly stretch  unable modified ROM x10 modified ROM 5" x10  modified ROM 5" x10          LTRs no ball    Alt x10ea  Alt x10ea Alt x10ea         Seated HS stretch  10x10"      unable        L/S Ext stretch   Standing x10 Standing x15  Standing x15          Heel raises                Standing Hip ABD   Right x10 B/L x10ea B/L  10x2 ea B/L  10x2 ea B/L  10x2 ea         Standing Hip Ext    B/L x10ea B/L  10x2 ea B/L  10x2 ea B/L  10x2 ea                                                         Ther Activity                Bike for LE mobility and endurance  Upright  5' Upright  5' Upright  5' Upright  5' Upright  5' Upright  5' Upright  5'        Side stepping     3x ea at mirror   3x ea at mirror        Step Ups     Tap onto 4"  10x 4" step up x10 6" step up x10         Leg Press        60# 10x        [place] Walkouts                Pt Ed HEP, POC HEP   HEP           Re-Evaluation                Modalities                Heat/ice (PRN)  Ice 10' Ice 10' Ice 10' Ice 10' Ice 10' Ice 10' Ice 10'        TENS

## 2023-07-10 ENCOUNTER — EVALUATION (OUTPATIENT)
Dept: PHYSICAL THERAPY | Facility: CLINIC | Age: 33
End: 2023-07-10
Payer: COMMERCIAL

## 2023-07-10 DIAGNOSIS — M25.551 PAIN OF RIGHT HIP: Primary | ICD-10-CM

## 2023-07-10 PROCEDURE — 97530 THERAPEUTIC ACTIVITIES: CPT

## 2023-07-10 PROCEDURE — 97140 MANUAL THERAPY 1/> REGIONS: CPT

## 2023-07-10 PROCEDURE — 97110 THERAPEUTIC EXERCISES: CPT

## 2023-07-10 NOTE — PROGRESS NOTES
PT Re-Evaluation     Today's date: 7/10/2023  Patient name: Tanya Dixon  : 1990  MRN: [id number]  Referring provider: Sung Roth DO  Dx:   Encounter Diagnosis     ICD-10-CM    1. Pain of right hip  M25.551           Start Time: 1045  Stop Time: 1130  Total time in clinic (min): 45 minutes    Assessment  Assessment details: Patient is a 34 y.o. female who presents to outpatient PT with reports of Right hip pain that started about 6 months ago. No recent imaging performed. Patient presents for re-evaluation today with reports of improving pain levels since she started PT. She reports reduced sharp pain in nature compared to on IE. She reports improved ability with getting in/out of car, putting shoes/socks on, and walking on uneven surfaces. She reports continued difficulty with maintaining prolonged postures such as sitting or standing, prolonged walking, and stair negotiation. Patient presents with improved lumbar spine mobility in all directions, and improved hip flexion, abduction, and external rotation mobility, but with continued ERP. Patient noted to have steadily improving strength and overall activity tolerance since start of PT. She continues to have some muscle fatigue with prolonged activity, with muscle soreness reported that reduces with rest and cold pack. She continues to have difficulty with hamstring flexibility / stretching at this time. Patient's current limitations continue to affect her ability to perform most daily activities including stair negotiation, squatting, bending forward, sit-stands, getting in/out of car, walking, standing, and prolonged sitting. This impacts ease with her ADLs and functional tasks. She lives with her parents and sister in a 2-story home, and thus has to navigate stairs frequently in the day. Patient has overall reported improved tolerance and positive response to PT interventions and exercises thus far.  Patient will benefit from continued skilled PT services to improve her pain levels, improve mobility, improve strength, and improve overall ease with ADLs to progress towards her PLOF. Patient would benefit from skilled PT services to address these impairments and to maximize function. Thank you for the referral.  Impairments: abnormal gait, abnormal or restricted ROM, activity intolerance, impaired balance, impaired physical strength, lacks appropriate home exercise program, pain with function, weight-bearing intolerance, poor posture  and poor body mechanics  Functional limitations: ascending>descending, squatting, bending forward, sit-stands, getting in/out of car, walking, standing, prolonged sittingBarriers to therapy: Chronicity of symptoms  Understanding of Dx/Px/POC: good   Prognosis: good    Goals  Impairment Goals 4-6 weeks   In order to maximize function patient will be able to. ..   - Decrease intensity/duration/frequency of pain to 4/10. Improved  - Demonstrate symmetrical hip AROM without pain. Improving  - Increase hip strength to 4/5 throughout to improve mechanics and stability with walking, standing, etc. Improving  - Demonstrate improved hip flexibility as demonstrated by increased ROM through therapeutic exercise. Improving, continues to have difficulty with HS flexibility / stretching    Functional Goals 6-8 weeks  In order to return to prior level of function patient will be able to. ..   - Participate in ADL's/IADL's/sport specific activities with no greater than 2/10 pain. Improved  - Increase Functional Status Measure (FOTO) to: anticipated at discharge. Improved  - Demonstrate independence and compliant with HEP. Met  - Demonstrate a squat and or sit to stand with good mechanics and eccentric control without pain/difficulty/compensation. Improved  - Demonstrate functional activities with good core and glute strength without compensation/pain/difficulty.  Improved   - Ascend and descend stairs without increased pain/compensation/difficulty and a reciprocal gait pattern. Slightly improved     Plan  Patient would benefit from: skilled PT  Planned modality interventions: cryotherapy  Other planned modality interventions: moist heat  Planned therapy interventions: joint mobilization, manual therapy, neuromuscular re-education, patient education, strengthening, stretching, therapeutic activities, therapeutic exercise, home exercise program, functional ROM exercises, Cristina taping, postural training, balance/weight bearing training, body mechanics training, flexibility and massage  Frequency: 1-2x/week. Duration in weeks: 4  Treatment plan discussed with: patient, PTA and referring physician        Subjective Evaluation    History of Present Illness  Mechanism of injury: Brayden Arias is a 34y.o. year-old female who presents to outpatient PT with reports of Right hip pain that started 6 months ago. Patient reports history of fall about 2 years ago, however mild pain at the time that reduced quickly. She reports no known RIANA of recent pain. She reports she started to notice it while putting on shoes/socks and performing bending forward activities. Patient saw her PCP on 2023 and was recommended for PT at this time. Patient did not receive any imaging.    Quality of life: good    Pain  Current pain rating: 3  At best pain ratin  At worst pain ratin  Location: Right hip  Quality: dull ache, radiating and tight  Relieving factors: rest and heat  Aggravating factors: sitting, standing, stair climbing, walking and lifting  Progression: no change    Social Support  Steps to enter house: yes  Stairs in house: yes   Lives in: multiple-level home  Lives with: parents    Employment status: not working  Exercise history: walking      Diagnostic Tests  No diagnostic tests performed  Treatments  Current treatment: physical therapy  Patient Goals  Patient goals for therapy: decreased pain, increased motion, improved balance, increased strength, independence with ADLs/IADLs and return to sport/leisure activities          Objective     Observations     Additional Observation Details  Standing posture: Right hip, patella, and medial malleolus slightly lower than Left  Gait Assessment: slight Right hip drop; increased Right trunk rotation    Lumbar Screen  Lumbar range of motion within normal limits with the following exceptions:Flexion: WNLs  Extension: WNLs with tightness  Side-bending and Rotation: WNLs; mild LBP with Left SBing    Active Range of Motion   Left Hip   Normal active range of motion    Right Hip   Flexion: 100 degrees with pain  Extension: 8 degrees   Abduction: 25 degrees with pain  External rotation (90/90): 40 degrees with pain  Internal rotation (90/90): WFL and with pain    Strength/Myotome Testing     Right Hip   Planes of Motion   Flexion: 4-  Extension: 3  Abduction: 4-  External rotation: 3+  Internal rotation: 3+    Tests     Right Hip   Positive LEONARDA, FADIR, Gaenslen's and scour. Negative Justino. Additional Tests Details  LLD (supine): Right leg "longer" than Left  Supine to Long-Sit Test: Right LE long to short (ALS)      Subjective: Patient reports her overall pain levels have improved. She reports continued pain and difficulty with prolonged sitting, standing, walking, and stair negotiation.         Diagnosis: Right hip pain  Precautions: GERD, anxiety/depression  ( * asterisk indicates given per HEP)  Next Physician Appointment:   Yuan Devine:     Manuals 6/27 6/29 7/3 7/6 7/10           [facility] TH DK DK TH DK           IAS  DK DK  DK           LE stretching    TH                            Neuro Re-Ed                PPT  5" x10 5" x10             TA ball press Stand  3"x15               Supine hip ADD ball sque  5" x10 5" x20             Supine BKFO S/L clams  10x2R  Supine clams GTB x10 Supine clams  GTB  x10 ea            Wobble Board AP, Lat  x2' ea AP, Lat  x2' ea AP, Lat  x2' ea 2' ea            SLS 2x30" ea   2x30"  ea            Cone Taps  2 floor, R SL x10 2 floor, FWD, LAT R SL x10ea             Glut squeeze 5"x15    Bridge x10                                                           Ther Ex                Supine Hip Flexor                Butterfly stretch  modified ROM 5" x10              LTRs no ball  Alt x10ea Alt x10ea  Alt x10ea           Seated HS stretch    unable            L/S Ext stretch  Standing x15              Heel raises                Standing Hip ABD B/L  10x2 ea B/L  10x2 ea B/L  10x2 ea             Standing Hip Ext B/L  10x2 ea B/L  10x2 ea B/L  10x2 ea                                                             Ther Activity                Bike for LE mobility and endurance Upright  5' Upright  5' Upright  5' Upright  5'            Side stepping 3x ea at mirror   3x ea at mirror            Step Ups Tap onto 4"  10x 4" step up x10 6" step up x10             Leg Press    60# 10x            Corine Walkouts                Pt Ed HEP               Re-Evaluation     DK           Modalities                Heat/ice (PRN) Ice 10' Ice 10' Ice 10' Ice 10' Ice 10'           TENS

## 2023-07-12 ENCOUNTER — OFFICE VISIT (OUTPATIENT)
Dept: HEMATOLOGY ONCOLOGY | Facility: CLINIC | Age: 33
End: 2023-07-12
Payer: COMMERCIAL

## 2023-07-12 VITALS
TEMPERATURE: 98.3 F | SYSTOLIC BLOOD PRESSURE: 112 MMHG | HEIGHT: 66 IN | HEART RATE: 76 BPM | RESPIRATION RATE: 16 BRPM | BODY MASS INDEX: 43.9 KG/M2 | DIASTOLIC BLOOD PRESSURE: 80 MMHG | OXYGEN SATURATION: 99 %

## 2023-07-12 DIAGNOSIS — R79.0 LOW FERRITIN: ICD-10-CM

## 2023-07-12 DIAGNOSIS — D50.9 IRON DEFICIENCY ANEMIA, UNSPECIFIED IRON DEFICIENCY ANEMIA TYPE: Primary | ICD-10-CM

## 2023-07-12 PROCEDURE — 99214 OFFICE O/P EST MOD 30 MIN: CPT

## 2023-07-12 NOTE — PROGRESS NOTES
[place] [id number]  Hematology Ambulatory Follow-Up  Jorge Smith, 1990, [id number]  7/12/2023    Assessment/Plan:  1. Iron deficiency anemia, unspecified iron deficiency anemia type  2. Low ferritin  Ms. Aleksandr Amor is a 42-year-old female seen in follow-up for iron deficiency. She has had no anemia associated with this and did not respond well to oral iron in the past therefore received IV iron. This had improved most of her symptoms and she was overall feeling well. Most recently her ferritin has declined to 43 with an iron saturation of 43% and serum iron of 113. with this decline she has experienced more fatigue and hair loss. We discussed a trial of Slow Fe once daily on Monday, Wednesday, and Friday due to prior GI upset with iron supplementation. She also has a mildly low B12 of 400 and could benefit symptom wise with a B12 supplement daily or even once a week. She will start these and call my office if she has any side effects. She will also call if her symptoms worsen for sooner evaluation prior to her follow-up. She will repeat labs in 6 to 7 months prior to follow-up with me in the office.    - CBC and differential; Future  - Iron Panel (Includes Ferritin, Iron Sat%, Iron, and TIBC); Future  - Folate; Future  - Methylmalonic acid, serum; Future  - Vitamin B12; Future  - Comprehensive metabolic panel; Future      The patient is scheduled for follow-up in approximately 6-7 months. Patient voiced agreement and understanding to the above. Patient knows to call the Hematology/Oncology office with any questions and concerns regarding the above.       Barrier(s) to care: None  The patient is able to self care.  ------------------------------------------------------------------------------------------------------    Chief Complaint   Patient presents with   • Follow-up       History of present illness:   Federico Lopez is a 42-year-old female seen in follow-up for low ferritin. She has no source of chronic leg loss but does have a diagnosis of IBS which could be contributing with the malabsorption component. She has symptoms that could also be associated due to her thyroid condition and has had a difficult time regulating her Synthroid dosage. Her hemoglobin has remained within normal limits and no signs of anemia.      04/13/2018:  Hemoglobin 13.1, MCV 96, platelets 623, WBC 4.8  04/17/2019:  Hemoglobin 13 7, MCV 92.6, platelets 639, DXF 4.6  04/28/2020:  Hemoglobin 13.3, MCV 93.5, platelets 635, WBC 5.7  08/16/2021:  Hemoglobin 12.9, MCV 91.7, platelets 758, WBC 4.9  05/16/2022: Ferritin 11, iron saturation 35%, serum iron 95, TIBC 273, vitamin B12 418  07/18/2022:  Hemoglobin 12.8, MCV 90, platelets 207, WBC 4.6              Ferritin 66, iron saturation 76%, TIBC 269, serum iron 198                08/20/2022:  Hemoglobin 12.7, MCV 93.3, platelets 412, WBC 6.7              Ferritin 20, iron saturation 29%, TIBC 288, serum iron 83  9/23/2022: Hemoglobin 13.2, MCV 89.6, platelets 884, WBC 6.1              Ferritin 10, iron saturation 30%, TIBC 293, serum iron 88  IV Venofer 200 mg x6: 11/7-12/13 12/14/2022: Hemoglobin 13.3, MCV 92.6, platelets 947, WBC 6              Ferritin 191, iron saturation 75%, TIBC 259, serum iron 190  7/6/2023: Hemoglobin 14, MCV 91.9, platelets 941, WBC 6   Ferritin 43, iron saturation 43%, TIBC 264, serum iron 113    Interval history: Overall she is doing well. She is becoming more fatigued and having more hair loss most recently. Otherwise she has no other complaints or concerns today. Review of Systems   Constitutional: Positive for fatigue (improving). Negative for activity change, appetite change, diaphoresis, fever and unexpected weight change. HENT: Negative for trouble swallowing and voice change.     Eyes: Negative for photophobia and visual disturbance. Respiratory: Negative for cough, chest tightness and shortness of breath (resolved). Cardiovascular: Negative for palpitations (resolved). Gastrointestinal: Negative for abdominal distention, abdominal pain, blood in stool, constipation, diarrhea, nausea and vomiting. Endocrine: Negative for cold intolerance and heat intolerance. Genitourinary: Negative for dysuria, hematuria, menstrual problem and urgency. Musculoskeletal: Negative for arthralgias, back pain, gait problem and joint swelling. Skin: Negative for pallor and rash. Neurological: Negative for dizziness, tremors, weakness, light-headedness and headaches. Hematological: Negative for adenopathy. Does not bruise/bleed easily. Psychiatric/Behavioral: Negative for confusion and sleep disturbance.        Patient Active Problem List   Diagnosis   • Other malaise and fatigue   • Class 2 obesity with body mass index (BMI) of 36.0 to 36.9 in adult   • IBS (irritable bowel syndrome)   • Hypothyroidism   • Anxiety   • Depression   • Atypical mole   • Acute pain of left shoulder   • Low ferritin   • Iron deficiency anemia, unspecified   • Sore throat   • Tinea corporis       Past Medical History:   Diagnosis Date   • Anxiety    • Constipation    • Depression    • Diarrhea    • GERD (gastroesophageal reflux disease)    • Hypothyroidism    • IBS (irritable bowel syndrome)    • Nausea    • Panic attacks    • Rash    • Varicella        Past Surgical History:   Procedure Laterality Date   • WISDOM TOOTH EXTRACTION         Family History   Problem Relation Age of Onset   • Hypothyroidism Mother    • Allergies Mother    • Thyroid disease Mother    • Diabetes Father    • Hyperlipidemia Father    • Hip dysplasia Sister    • Seizures Sister    • Depression Brother    • Substance Abuse Brother         heroin   • Stroke Paternal Grandmother    • Skin cancer Paternal Grandfather    • Prostate cancer Paternal Grandfather    • Lung cancer Paternal Grandfather    • Cancer Paternal Grandfather         Skin, Prostate, Brain,Lung   • Osteoporosis Paternal Grandfather    • Personality disorder Sister    • Breast cancer Neg Hx    • Colon cancer Neg Hx    • Ovarian cancer Neg Hx    • Uterine cancer Neg Hx    • Cervical cancer Neg Hx        Social History     Socioeconomic History   • Marital status: Single     Spouse name: None   • Number of children: None   • Years of education: None   • Highest education level: None   Occupational History   • None   Tobacco Use   • Smoking status: Never   • Smokeless tobacco: Never   Vaping Use   • Vaping Use: Never used   Substance and Sexual Activity   • Alcohol use: Not Currently   • Drug use: Never   • Sexual activity: Never   Other Topics Concern   • None   Social History Narrative    Brother  in 2017      Social Determinants of Health     Financial Resource Strain: Not on file   Food Insecurity: Not on file   Transportation Needs: Not on file   Physical Activity: Not on file   Stress: Not on file   Social Connections: Not on file   Intimate Partner Violence: Not on file   Housing Stability: Not on file         Current Outpatient Medications:   •  Cholecalciferol 1.25 MG (65415 UT) TABS, Take 1 tablet (50,000 Units total) by mouth every 7 days x12wks and then take OTC Vit D 4000IU QD (Patient taking differently: Take 5,000 Units by mouth daily od), Disp: 12 tablet, Rfl: 0  •  levothyroxine 125 mcg tablet, Take 125 mcg by mouth daily  , Disp: , Rfl:   •  naproxen (NAPROSYN) 500 mg tablet, Take 1 tablet (500 mg total) by mouth 2 (two) times a day with meals, Disp: 28 tablet, Rfl: 0  •  ferrous sulfate 325 (65 Fe) mg tablet, Take by mouth daily with breakfast (Patient not taking: Reported on 2023), Disp: , Rfl:   •  ketoconazole (NIZORAL) 2 % cream, Apply topically daily (Patient not taking: Reported on 2023), Disp: 15 g, Rfl: 0    Allergies   Allergen Reactions   • Other Shortness Of Breath     almonds • Strawberry C [Ascorbate - Food Allergy] GI Intolerance       Objective:  /80 (BP Location: Left arm, Patient Position: Sitting, Cuff Size: Large)   Pulse 76   Temp 98.3 °F (36.8 °C) (Temporal)   Resp 16   Ht 5' 6" (1.676 m)   SpO2 99%   BMI 43.90 kg/m²    Physical Exam  Constitutional:       General: She is not in acute distress. Appearance: Normal appearance. She is not ill-appearing. HENT:      Head: Normocephalic and atraumatic. Eyes:      Extraocular Movements: Extraocular movements intact. Conjunctiva/sclera: Conjunctivae normal.   Cardiovascular:      Rate and Rhythm: Normal rate and regular rhythm. Pulses: Normal pulses. Heart sounds: Normal heart sounds. No murmur heard. Pulmonary:      Effort: Pulmonary effort is normal.      Breath sounds: Normal breath sounds. No wheezing. Abdominal:      General: Bowel sounds are normal. There is no distension. Palpations: Abdomen is soft. Tenderness: There is no abdominal tenderness. Musculoskeletal:      Cervical back: Normal range of motion. No tenderness. Right lower leg: No edema. Left lower leg: No edema. Lymphadenopathy:      Cervical: No cervical adenopathy. Skin:     General: Skin is warm and dry. Capillary Refill: Capillary refill takes less than 2 seconds. Coloration: Skin is not pale. Findings: No bruising or lesion. Neurological:      General: No focal deficit present. Mental Status: She is alert and oriented to person, place, and time. Mental status is at baseline. Motor: No weakness. Gait: Gait normal.   Psychiatric:         Mood and Affect: Mood normal.         Behavior: Behavior normal.         Thought Content:  Thought content normal.         Judgment: Judgment normal.         Result Review  Labs:  Lab Results   Component Value Date    WBC 6.0 07/06/2023    HGB 14.0 07/06/2023    HCT 41.1 07/06/2023    MCV 91.9 07/06/2023     07/06/2023     Lab Results   Component Value Date    IRON 113 07/06/2023    TIBC 264 07/06/2023    FERRITIN 43 07/06/2023       Imaging:   No relevant imaging to review    Please note: This report has been generated by a voice recognition software system. Therefore there may be syntax, spelling, and/or grammatical errors. Please call if you have any questions.

## 2023-07-13 ENCOUNTER — OFFICE VISIT (OUTPATIENT)
Dept: PHYSICAL THERAPY | Facility: CLINIC | Age: 33
End: 2023-07-13
Payer: COMMERCIAL

## 2023-07-13 DIAGNOSIS — M25.551 PAIN OF RIGHT HIP: Primary | ICD-10-CM

## 2023-07-13 PROCEDURE — 97110 THERAPEUTIC EXERCISES: CPT

## 2023-07-13 PROCEDURE — 97112 NEUROMUSCULAR REEDUCATION: CPT

## 2023-07-13 PROCEDURE — 97140 MANUAL THERAPY 1/> REGIONS: CPT

## 2023-07-13 NOTE — PROGRESS NOTES
Daily Note     Today's date: 2023  Patient name: Crystal Bush  : 1990  MRN: [id number]  Referring provider: Bonnie Shea DO  Dx:   Encounter Diagnosis     ICD-10-CM    1. Pain of right hip  M25.551                      Subjective: Pt states that her hip was a little achy yesterday and she wasn't sure why. Still better than it had been. Objective: See treatment diary below      Assessment: Tolerated treatment well. Progressed with strengthening as tolerated. Pt challenged with balance due to weakness/fatigue and requires occasional UE support to maintain her balance. Pt only able to tolerate very gentle STM to hip musculature with decreased pain noted after.         Plan: Continue per plan of care     Diagnosis: Right hip pain  Precautions: GERD, anxiety/depression  ( * asterisk indicates given per HEP)  Next Physician Appointment:   Keanu Carbajal:     Manuals 6/27 6/29 7/3 7/6 7/10 7/13          STM TH DK DK TH DK TH          IASTM  DK DK  DK TH          LE stretching    TH                            Neuro Re-Ed                PPT  5" x10 5" x10             TA ball press Stand  3"x15               Supine hip ADD ball sque  5" x10 5" x20             Supine BKFO S/L clams  10x2R  Supine clams GTB x10 Supine clams  GTB  x10 ea  S/L  Clams  10x2 R          Wobble Board AP, Lat  x2' ea AP, Lat  x2' ea AP, Lat  x2' ea 2' ea  2' ea          SLS 2x30" ea   2x30"  ea  2x30" ea          Cone Taps  2 floor, R SL x10 2 floor, FWD, LAT R SL x10ea             Glut squeeze 5"x15    Bridge x10 Bridge  10x          Tandem balance on foam      2x30" ea                                          Ther Ex                Supine Hip Flexor                Butterfly stretch  modified ROM 5" x10              LTRs no ball  Alt x10ea Alt x10ea  Alt x10ea           Seated HS stretch    unable            L/S Ext stretch  Standing x15              Heel raises                Standing Hip ABD B/L  10x2 ea B/L  10x2 ea B/L  10x2 ea             Standing Hip Ext B/L  10x2 ea B/L  10x2 ea B/L  10x2 ea                                                             Ther Activity                Bike for LE mobility and endurance Upright  5' Upright  5' Upright  5' Upright  5'  Upright  5'          Side stepping 3x ea at mirror   3x ea at mirror  3x ea at mirror          Step Ups Tap onto 4"  10x 4" step up x10 6" step up x10             Leg Press    60# 10x  60# 10x2          [place] Walkouts                Pt Ed HEP     HEP          Re-Evaluation     DK           Modalities                Heat/ice (PRN) Ice 10' Ice 10' Ice 10' Ice 10' Ice 10' Ice 10'          TENS

## 2023-07-17 ENCOUNTER — OFFICE VISIT (OUTPATIENT)
Dept: PHYSICAL THERAPY | Facility: CLINIC | Age: 33
End: 2023-07-17
Payer: COMMERCIAL

## 2023-07-17 DIAGNOSIS — M25.551 PAIN OF RIGHT HIP: Primary | ICD-10-CM

## 2023-07-17 PROCEDURE — 97112 NEUROMUSCULAR REEDUCATION: CPT

## 2023-07-17 PROCEDURE — 97530 THERAPEUTIC ACTIVITIES: CPT

## 2023-07-17 PROCEDURE — 97140 MANUAL THERAPY 1/> REGIONS: CPT

## 2023-07-17 NOTE — PROGRESS NOTES
Daily Note     Today's date: 2023  Patient name: April Judd  : 1990  MRN: [id number]  Referring provider: Amol Arroyo DO  Dx:   Encounter Diagnosis     ICD-10-CM    1. Pain of right hip  M25.551           Start Time: 1000  Stop Time: 1045  Total time in clinic (min): 45 minutes    Subjective: Patient reports she did a lot of walk last week, but had the weekend to rest.       Objective: See treatment diary below      Assessment: Tolerated treatment well. Initiated session with stationary bike for LE mobility and endurance. Focused on functional strengthening and balance to progress stability with daily activities and prolonged walking. Progressed tandem stance on foam with ball toss to trampoline. She noted to have some difficulty with mild increase in pain that relieved with rest. Worked on step up/down focused on muscle activation quad and glute to improve control and form without increased pain. Overall good response and no increase in pain with treatment exercises today. Patient exhibited good technique with therapeutic exercises and would benefit from continued PT      Plan: Continue per plan of care. Progress treatment as tolerated.        Diagnosis: Right hip pain  Precautions: GERD, anxiety/depression  ( * asterisk indicates given per HEP)  Next Physician Appointment:   Mary Nesbitt:     Manuals 6/27 6/29 7/3 7/6 7/10 7/13 7/17         STM TH DK DK TH DK TH DK         IASTM  DK DK  DK TH DK         LE stretching    TH                            Neuro Re-Ed                PPT  5" x10 5" x10             TA ball press Stand  3"x15               Supine hip ADD ball sque  5" x10 5" x20             Supine BKFO S/L clams  10x2R  Supine clams GTB x10 Supine clams  GTB  x10 ea  S/L  Clams  10x2 R S/L  Clams  10x2 R         Wobble Board AP, Lat  x2' ea AP, Lat  x2' ea AP, Lat  x2' ea 2' ea  2' ea 2' ea         SLS 2x30" ea   2x30"  ea  2x30" ea          Cone Taps  2 floor, R SL x10 2 floor, FWD, LAT R SL x10ea             Glut squeeze 5"x15    Bridge x10 Bridge  10x W/ ball squeeze x10         Tandem balance on foam      2x30" ea trampoline ball toss x10ea                                         Ther Ex                Supine Hip Flexor                Butterfly stretch  modified ROM 5" x10              LTRs no ball  Alt x10ea Alt x10ea  Alt x10ea           Seated HS stretch    unable            L/S Ext stretch  Standing x15              Heel raises                Standing Hip ABD B/L  10x2 ea B/L  10x2 ea B/L  10x2 ea             Standing Hip Ext B/L  10x2 ea B/L  10x2 ea B/L  10x2 ea                                                             Ther Activity                Bike for LE mobility and endurance Upright  5' Upright  5' Upright  5' Upright  5'  Upright  5' Upright  5'         Side stepping 3x ea at mirror   3x ea at mirror  3x ea at mirror          Step Ups Tap onto 4"  10x 4" step up x10 6" step up x10    Ups/downs 6" x10ea         Leg Press    60# 10x  60# 10x2          [place] Walkouts                Pt Ed HEP     HEP          Re-Evaluation     DK           Modalities                Heat/ice (PRN) Ice 10' Ice 10' Ice 10' Ice 10' Ice 10' Ice 10' Ice 10'         TENS

## 2023-07-20 ENCOUNTER — OFFICE VISIT (OUTPATIENT)
Dept: PHYSICAL THERAPY | Facility: CLINIC | Age: 33
End: 2023-07-20
Payer: COMMERCIAL

## 2023-07-20 DIAGNOSIS — M25.551 PAIN OF RIGHT HIP: Primary | ICD-10-CM

## 2023-07-20 PROCEDURE — 97112 NEUROMUSCULAR REEDUCATION: CPT

## 2023-07-20 PROCEDURE — 97530 THERAPEUTIC ACTIVITIES: CPT

## 2023-07-20 PROCEDURE — 97140 MANUAL THERAPY 1/> REGIONS: CPT

## 2023-07-20 NOTE — PROGRESS NOTES
Daily Note     Today's date: 2023  Patient name: Maria Alejandra Carbone  : 1990  MRN: [id number]  Referring provider: Esperanza Gonzalez DO  Dx:   Encounter Diagnosis     ICD-10-CM    1. Pain of right hip  M25.551           Start Time: 1005  Stop Time: 1045  Total time in clinic (min): 40 minutes    Subjective: Patient reports she is a little sore from standing a lot at a concert yesterday, otherwise reports feeling better overall. Objective: See treatment diary below      Assessment: Tolerated treatment well. Initiated session with upright bike for LE mobility, posture, and endurance. Focused on functional strength training and balance. Added TRX squats to work on proper mechanics and muscle activation with functional squatting/sit-stand tasks. Progressed balance activities such as cone taps from foam and foam pad on wobble board. Improved ability to perform L SL balance and Right cone tap without increased pain, compared to significant difficulty in previous sessions. Patient exhibited good technique with therapeutic exercises and would benefit from continued PT      Plan: Continue per plan of care. Progress treatment as tolerated.        Diagnosis: Right hip pain  Precautions: GERD, anxiety/depression  ( * asterisk indicates given per HEP)  Next Physician Appointment:   Bairon Gar:     Manuals 6/27 6/29 7/3 7/6 7/10 7/13 7/17 7/20        STM TH DK DK TH DK TH DK DK        IASTM  DK DK  DK TH DK DK        LE stretching    TH                            Neuro Re-Ed                PPT  5" x10 5" x10             TA ball press Stand  3"x15               Supine hip ADD ball sque  5" x10 5" x20             Supine BKFO S/L clams  10x2R  Supine clams GTB x10 Supine clams  GTB  x10 ea  S/L  Clams  10x2 R S/L  Clams  10x2 R S/L  Clams  10x2 R        Wobble Board AP, Lat  x2' ea AP, Lat  x2' ea AP, Lat  x2' ea 2' ea  2' ea 2' ea W/ Foam x2' ea        SLS 2x30" ea   2x30"  ea  2x30" ea          Cone Taps  2 floor, R SL x10 2 floor, FWD, LAT R SL x10ea     2 foam FWD alt x10ea        Glut squeeze 5"x15    Bridge x10 Bridge  10x W/ ball squeeze x10         Tandem balance on foam      2x30" ea trampoline ball toss x10ea                                         Ther Ex                Supine Hip Flexor                Butterfly stretch  modified ROM 5" x10              LTRs no ball  Alt x10ea Alt x10ea  Alt x10ea           Seated HS stretch    unable            L/S Ext stretch  Standing x15              Heel raises                Standing Hip ABD B/L  10x2 ea B/L  10x2 ea B/L  10x2 ea             Standing Hip Ext B/L  10x2 ea B/L  10x2 ea B/L  10x2 ea                                                             Ther Activity                Bike for LE mobility and endurance Upright  5' Upright  5' Upright  5' Upright  5'  Upright  5' Upright  5' Upright  5'        Side stepping 3x ea at mirror   3x ea at mirror  3x ea at mirror          Step Ups Tap onto 4"  10x 4" step up x10 6" step up x10    Ups/downs 6" x10ea Ups/downs 6" x10ea        Leg Press    60# 10x  60# 10x2  60# 10x2        TRX Squats        x15        [place] Walkouts        FWD/BWD #15 x5 laps                        X walks                                                Pt Ed HEP     HEP          Re-Evaluation     DK           Modalities                Heat/ice (PRN) Ice 10' Ice 10' Ice 10' Ice 10' Ice 10' Ice 10' Ice 10' Ice 10'        TENS

## 2023-07-25 ENCOUNTER — OFFICE VISIT (OUTPATIENT)
Dept: PHYSICAL THERAPY | Facility: CLINIC | Age: 33
End: 2023-07-25
Payer: COMMERCIAL

## 2023-07-25 DIAGNOSIS — M25.551 PAIN OF RIGHT HIP: Primary | ICD-10-CM

## 2023-07-25 PROCEDURE — 97140 MANUAL THERAPY 1/> REGIONS: CPT

## 2023-07-25 PROCEDURE — 97530 THERAPEUTIC ACTIVITIES: CPT

## 2023-07-25 PROCEDURE — 97112 NEUROMUSCULAR REEDUCATION: CPT | Performed by: PHYSICAL THERAPIST

## 2023-07-25 NOTE — PROGRESS NOTES
Daily Note     Today's date: 2023  Patient name: April Judd  : 1990  MRN: [id number]  Referring provider: Amol Arroyo DO  Dx:   Encounter Diagnosis     ICD-10-CM    1. Pain of right hip  M25.551           Start Time: 1045  Stop Time: 1130  Total time in clinic (min): 45 minutes    Subjective: Patient reports she is a little sore today, but no significant changes in pain. She reports she did a lot of cleaning over the weekend. Objective: See treatment diary below      Assessment: Tolerated treatment well. Initiated session with functional strengthening, with improved form noted with TRX squats. Added X walks with red TB to improve hip abductor strength and mechanics with walking, etc. Added BOSU step ups to progress SL balance on compliant surface. She continues to have some soreness with Right LE elevation / march, but no significant pain and able to complete with good tolerance. Progressed clamshells to red TB with some soreness noted after but no significant pain. Patient exhibited good technique with therapeutic exercises and would benefit from continued PT      Plan: Continue per plan of care. Progress treatment as tolerated.        Diagnosis: Right hip pain  Precautions: GERD, anxiety/depression  ( * asterisk indicates given per HEP)  Next Physician Appointment:   Mary Nesbitt:     Manuals 6/27 6/29 7/3 7/6 7/10 7/13 7/17 7/20 7/25       STM TH DK DK TH DK TH DK DK DK       IASTM  DK DK  DK TH DK DK DK       LE stretching    TH                            Neuro Re-Ed                PPT  5" x10 5" x10             TA ball press Stand  3"x15               Supine hip ADD ball sque  5" x10 5" x20             Supine BKFO S/L clams  10x2R  Supine clams GTB x10 Supine clams  GTB  x10 ea  S/L  Clams  10x2 R S/L  Clams  10x2 R S/L  Clams  10x2 R RTB S/L  Clams  x20 R       Wobble Board AP, Lat  x2' ea AP, Lat  x2' ea AP, Lat  x2' ea 2' ea  2' ea 2' ea W/ Foam x2' ea        SLS 2x30" ea 2x30"  ea  2x30" ea          Cone Taps  2 floor, R SL x10 2 floor, FWD, LAT R SL x10ea     2 foam FWD alt x10ea 2 foam FWD alt x10ea       Glut squeeze 5"x15    Bridge x10 Bridge  10x W/ ball squeeze x10         Tandem balance on foam      2x30" ea trampoline ball toss x10ea         BOSU Step ups         FWD x10 R                                       Ther Ex                Supine Hip Flexor                Butterfly stretch  modified ROM 5" x10              LTRs no ball  Alt x10ea Alt x10ea  Alt x10ea           Seated HS stretch    unable            L/S Ext stretch  Standing x15              Heel raises                Standing Hip ABD B/L  10x2 ea B/L  10x2 ea B/L  10x2 ea             Standing Hip Ext B/L  10x2 ea B/L  10x2 ea B/L  10x2 ea                                                             Ther Activity                Bike for LE mobility and endurance Upright  5' Upright  5' Upright  5' Upright  5'  Upright  5' Upright  5' Upright  5' Upright  5'       Side stepping 3x ea at mirror   3x ea at mirror  3x ea at mirror          Step Ups Tap onto 4"  10x 4" step up x10 6" step up x10    Ups/downs 6" x10ea Ups/downs 6" x10ea        Leg Press    60# 10x  60# 10x2  60# 10x2 60# 10x2       TRX Squats        x15 2x10       [place] Walkouts        FWD/BWD #15 x5 laps                        X walks         RTB @ blue line x1 lap                                       Pt Ed HEP     HEP          Re-Evaluation     DK           Modalities                Heat/ice (PRN) Ice 10' Ice 10' Ice 10' Ice 10' Ice 10' Ice 10' Ice 10' Ice 10' Ice 10'       TENS

## 2023-07-27 ENCOUNTER — OFFICE VISIT (OUTPATIENT)
Dept: PHYSICAL THERAPY | Facility: CLINIC | Age: 33
End: 2023-07-27
Payer: COMMERCIAL

## 2023-07-27 DIAGNOSIS — M25.551 PAIN OF RIGHT HIP: Primary | ICD-10-CM

## 2023-07-27 PROCEDURE — 97112 NEUROMUSCULAR REEDUCATION: CPT

## 2023-07-27 PROCEDURE — 97140 MANUAL THERAPY 1/> REGIONS: CPT

## 2023-07-27 PROCEDURE — 97530 THERAPEUTIC ACTIVITIES: CPT

## 2023-07-27 NOTE — PROGRESS NOTES
Daily Note     Today's date: 2023  Patient name: Itzel Doty  : 1990  MRN: [id number]  Referring provider: Raul Dempsey DO  Dx:   Encounter Diagnosis     ICD-10-CM    1. Pain of right hip  M25.551           Start Time: 1215  Stop Time: 1300  Total time in clinic (min): 45 minutes    Subjective: Patient reports no significant soreness on arrival today. Objective: See treatment diary below      Assessment: Tolerated treatment well. Initiated session with TRX squats and stationary bike. Improved form and no signiificant deviations noted with squats. Added single leg leg press with good control and no reports of pain. Patient tolerated balance exercises with improved stability but still requires light UE support to avoid LOB or fall. Patient noted to have TTP along distal aspect of lateral thigh / ITB region. Relieved with STM and cold pack end of session. Patient exhibited good technique with therapeutic exercises and would benefit from continued PT      Plan: Continue per plan of care. Progress treatment as tolerated.        Diagnosis: Right hip pain  Precautions: GERD, anxiety/depression  ( * asterisk indicates given per HEP)  Next Physician Appointment:   Anuja Batch:     Manuals 7/3 7/6 7/10 7/13 7/17 7/20 7/25 7/27          STM DK TH DK TH DK DK DK DK          IASTM DK  DK TH DK DK DK DK          LE stretching  TH                                  Neuro Re-Ed                  PPT 5" x10                 TA ball press                  Supine hip ADD ball sque 5" x20                 Supine BKFO Supine clams GTB x10 Supine clams  GTB  x10 ea  S/L  Clams  10x2 R S/L  Clams  10x2 R S/L  Clams  10x2 R RTB S/L  Clams  x20 R RTB S/L  Clams  x20 R          Wobble Board AP, Lat  x2' ea 2' ea  2' ea 2' ea W/ Foam x2' ea            SLS  2x30"  ea  2x30" ea              Cone Taps 2 floor, FWD, LAT R SL x10ea     2 foam FWD alt x10ea 2 foam FWD alt x10ea           Glut squeeze   Bridge x10 Bridge  10x W/ ball squeeze x10             Tandem balance on foam    2x30" ea trampoline ball toss x10ea             BOSU Step ups       FWD x10 R FWD w/ march x10 R                                              Ther Ex                  Supine Hip Flexor                  Butterfly stretch                  LTRs no ball Alt x10ea  Alt x10ea               Seated HS stretch  unable                L/S Ext stretch                  Heel raises                  Standing Hip ABD B/L  10x2 ea                 Standing Hip Ext B/L  10x2 ea                                                                       Ther Activity                  Bike for LE mobility and endurance Upright  5' Upright  5'  Upright  5' Upright  5' Upright  5' Upright  5' Upright  5'          Side stepping  3x ea at mirror  3x ea at mirror              Step Ups 6" step up x10    Ups/downs 6" x10ea Ups/downs 6" x10ea            Leg Press  60# 10x  60# 10x2  60# 10x2 60# 10x2 60# 10x2; SL 30# x10          TRX Squats      x15 2x10 2x10          [place] Walkouts      FWD/BWD #15 x5 laps  FWD/BWD #15 x5 laps, LAT x2 ea                            X walks       RTB @ blue line x1 lap                                               Pt Ed    HEP              Re-Evaluation   DK               Modalities                  Heat/ice (PRN) Ice 10' Ice 10' Ice 10' Ice 10' Ice 10' Ice 10' Ice 10' Ice 10'          TENS

## 2023-07-28 ENCOUNTER — OFFICE VISIT (OUTPATIENT)
Dept: FAMILY MEDICINE CLINIC | Facility: CLINIC | Age: 33
End: 2023-07-28
Payer: COMMERCIAL

## 2023-07-28 VITALS
SYSTOLIC BLOOD PRESSURE: 122 MMHG | OXYGEN SATURATION: 99 % | HEIGHT: 66 IN | BODY MASS INDEX: 43.23 KG/M2 | WEIGHT: 269 LBS | DIASTOLIC BLOOD PRESSURE: 74 MMHG | HEART RATE: 100 BPM | RESPIRATION RATE: 16 BRPM

## 2023-07-28 DIAGNOSIS — M25.551 PAIN OF RIGHT HIP: ICD-10-CM

## 2023-07-28 DIAGNOSIS — E55.9 VITAMIN D DEFICIENCY: ICD-10-CM

## 2023-07-28 DIAGNOSIS — H93.8X3 CONGESTION OF BOTH EARS: Primary | ICD-10-CM

## 2023-07-28 PROCEDURE — 99213 OFFICE O/P EST LOW 20 MIN: CPT | Performed by: FAMILY MEDICINE

## 2023-07-28 RX ORDER — FLUTICASONE PROPIONATE 50 MCG
1 SPRAY, SUSPENSION (ML) NASAL DAILY
Qty: 11.1 ML | Refills: 0 | Status: SHIPPED | OUTPATIENT
Start: 2023-07-28

## 2023-07-28 NOTE — PROGRESS NOTES
Assessment/Plan:   Diagnoses and all orders for this visit:    Congestion of both ears  -     fluticasone (FLONASE) 50 mcg/act nasal spray; 1 spray into each nostril daily  - advised OTC Zyrtec and Mucinex   - eRx for Flonase sent to pharmacy on file     Vitamin D deficiency  -     Vitamin D 25 hydroxy; Future  - cont Vit D 5000IU QD     Pain of right hip  -     XR hip/pelv 2-3 vws right if performed; Future  - R-hip pain has gotten a lot better s/p starting PT but does hear an intermittent "click" - no FHx or PMHx of arthritis   - will check Xray   - f/u PRN - pt aware and agreeable           Subjective:    Patient ID: Coral Castro is a 34 y.o. female. HPI   27yo F presents to the office for eval of earache   - R>L ear pain for the past 2wks   - hurts to eat/swallow and laying down on it  - muffled hearing   - tried using Peroxide and heat - w/o relief   - denies F/C/N/V/sore throat  - decreased appetite   - (+) congestion, sinus pressure  - R-hip pain has gotten a lot better s/p starting PT but does hear an intermittent "click" - no FHx or PMHx of arthritis   - has been able to tolerate Vit [id number] QD and would like to recheck levels          The following portions of the patient's history were reviewed and updated as appropriate: allergies, current medications, past family history, past medical history, past social history, past surgical history and problem list.    Review of Systems  as per HPI    Objective:  /74 (BP Location: Left arm, Patient Position: Sitting, Cuff Size: Large)   Pulse 100   Resp 16   Ht 5' 6" (1.676 m)   Wt 122 kg (269 lb)   SpO2 99%   BMI 43.42 kg/m²    Physical Exam  Vitals reviewed. Constitutional:       General: She is not in acute distress. Appearance: Normal appearance. She is not ill-appearing, toxic-appearing or diaphoretic. HENT:      Head: Normocephalic and atraumatic. Right Ear: External ear normal. A middle ear effusion is present.  There is no impacted cerumen. Left Ear: External ear normal. A middle ear effusion is present. There is no impacted cerumen. Nose: Congestion present. Right Sinus: Maxillary sinus tenderness present. No frontal sinus tenderness. Left Sinus: Maxillary sinus tenderness present. No frontal sinus tenderness. Mouth/Throat:      Lips: Pink. Mouth: Mucous membranes are moist.      Tongue: No lesions. Palate: No mass. Pharynx: Oropharynx is clear. No pharyngeal swelling, oropharyngeal exudate, posterior oropharyngeal erythema or uvula swelling. Eyes:      General: No scleral icterus. Right eye: No discharge. Left eye: No discharge. Extraocular Movements: Extraocular movements intact. Conjunctiva/sclera: Conjunctivae normal.   Pulmonary:      Effort: Pulmonary effort is normal.   Musculoskeletal:         General: No tenderness. Neurological:      Mental Status: She is alert. BMI Counseling: Body mass index is 43.42 kg/m². The BMI is above normal. Nutrition recommendations include 3-5 servings of fruits/vegetables daily. Exercise recommendations include exercising 3-5 times per week.

## 2023-07-31 ENCOUNTER — OFFICE VISIT (OUTPATIENT)
Dept: PHYSICAL THERAPY | Facility: CLINIC | Age: 33
End: 2023-07-31
Payer: COMMERCIAL

## 2023-07-31 ENCOUNTER — HOSPITAL ENCOUNTER (OUTPATIENT)
Dept: RADIOLOGY | Facility: HOSPITAL | Age: 33
Discharge: HOME | End: 2023-07-31
Payer: COMMERCIAL

## 2023-07-31 DIAGNOSIS — M25.551 PAIN OF RIGHT HIP: Primary | ICD-10-CM

## 2023-07-31 DIAGNOSIS — M25.551 PAIN OF RIGHT HIP: ICD-10-CM

## 2023-07-31 PROCEDURE — 97112 NEUROMUSCULAR REEDUCATION: CPT

## 2023-07-31 PROCEDURE — 97140 MANUAL THERAPY 1/> REGIONS: CPT

## 2023-07-31 PROCEDURE — 73502 X-RAY EXAM HIP UNI 2-3 VIEWS: CPT

## 2023-07-31 PROCEDURE — 97530 THERAPEUTIC ACTIVITIES: CPT

## 2023-07-31 NOTE — PROGRESS NOTES
Daily Note     Today's date: 2023  Patient name: Fredo Coley  : 1990  MRN: [id number]  Referring provider: Breanne Rowe DO  Dx:   Encounter Diagnosis     ICD-10-CM    1. Pain of right hip  M25.551                      Subjective: Pt states that she saw her PCP and  is supposed to go get an x-ray to check for arthritis. Objective: See treatment diary below      Assessment: Tolerated treatment well. Progressed with strengthening as tolerated. Weight was increased on leg press with muscle fatigue noted, no increased pain sx's. Tenderness is still noted at proximal hip at hip flexor and ITB. Pt is able to tolerate increased pressure than originally. Discussed HEP with pt reporting understanding. Will progress as able.       Plan: Continue per plan of care     Diagnosis: Right hip pain  Precautions: GERD, anxiety/depression  ( * asterisk indicates given per HEP)  Next Physician Appointment:   Fransisco Pagan:     Manuals 7/3 7/6 7/10 7/13 7/17 7/20 7/25 7/27 7/31         STM DK TH DK TH DK DK DK DK TH         IASTM DK  DK TH DK DK DK DK          LE stretching  TH                                  Neuro Re-Ed                  PPT 5" x10                 TA ball press                  Supine hip ADD ball sque 5" x20                 Supine BKFO Supine clams GTB x10 Supine clams  GTB  x10 ea  S/L  Clams  10x2 R S/L  Clams  10x2 R S/L  Clams  10x2 R RTB S/L  Clams  x20 R RTB S/L  Clams  x20 R RTB  S/L  Clams  x20 R         Wobble Board AP, Lat  x2' ea 2' ea  2' ea 2' ea W/ Foam x2' ea   2' ea         SLS  2x30"  ea  2x30" ea              Cone Taps 2 floor, FWD, LAT R SL x10ea     2 foam FWD alt x10ea 2 foam FWD alt x10ea           Glut squeeze   Bridge x10 Bridge  10x W/ ball squeeze x10             Tandem balance on foam    2x30" ea trampoline ball toss x10ea             BOSU Step ups       FWD x10 R FWD w/ march x10 R                                              Ther Ex                  Supine Hip Flexor                  Butterfly stretch                  LTRs no ball Alt x10ea  Alt x10ea               Seated HS stretch  unable                L/S Ext stretch                  Heel raises                  Standing Hip ABD B/L  10x2 ea                 Standing Hip Ext B/L  10x2 ea                                                                       Ther Activity                  Bike for LE mobility and endurance Upright  5' Upright  5'  Upright  5' Upright  5' Upright  5' Upright  5' Upright  5' Upright  5'         Side stepping  3x ea at mirror  3x ea at mirror              Step Ups 6" step up x10    Ups/downs 6" x10ea Ups/downs 6" x10ea            Leg Press  60# 10x  60# 10x2  60# 10x2 60# 10x2 60# 10x2; SL 30# x10 70#  10x2  SL 30#  10x2         TRX Squats      x15 2x10 2x10 2x10         [place] Walkouts      FWD/BWD #15 x5 laps  FWD/BWD #15 x5 laps, LAT x2 ea                            X walks       RTB @ blue line x1 lap  YTB  2x at blue line                                             Pt Ed    HEP              Re-Evaluation   DK               Modalities                  Heat/ice (PRN) Ice 10' Ice 10' Ice 10' Ice 10' Ice 10' Ice 10' Ice 10' Ice 10' Ice 10'         TENS

## 2023-08-03 ENCOUNTER — OFFICE VISIT (OUTPATIENT)
Dept: PHYSICAL THERAPY | Facility: CLINIC | Age: 33
End: 2023-08-03
Payer: COMMERCIAL

## 2023-08-03 DIAGNOSIS — M25.551 PAIN OF RIGHT HIP: Primary | ICD-10-CM

## 2023-08-03 PROCEDURE — 97112 NEUROMUSCULAR REEDUCATION: CPT

## 2023-08-03 PROCEDURE — 97140 MANUAL THERAPY 1/> REGIONS: CPT

## 2023-08-03 PROCEDURE — 97530 THERAPEUTIC ACTIVITIES: CPT

## 2023-08-03 NOTE — PROGRESS NOTES
Daily Note     Today's date: 8/3/2023  Patient name: Alissa Lemus  : 1990  MRN: [id number]  Referring provider: Venu Simon DO  Dx:   Encounter Diagnosis     ICD-10-CM    1. Pain of right hip  M25.551           Start Time: 1045  Stop Time: 1130  Total time in clinic (min): 45 minutes    Subjective: Patient reports she feels a little stiff, but no significant changes in pain. Patient reports she got x-ray, but still has to review with PCP. Objective: See treatment diary below      Assessment: Tolerated treatment well. Patient exhibits improved form and motor control with squats and leg press. Added SL balance on foam with trampoline toss. Patient was challenged with maintaining stability, but improved with repetition. Completed all other exercises with good form and tolerance. TTP noted along distal lateral thigh, but reduced with STM and IASTM. Reduced pain with cold pack end of session. Patient exhibited good technique with therapeutic exercises and would benefit from continued PT      Plan: Continue per plan of care. Progress treatment as tolerated.        Diagnosis: Right hip pain  Precautions: GERD, anxiety/depression  ( * asterisk indicates given per HEP)  Next Physician Appointment:   Marie Cade:     Manuals 7/3 7/6 7/10 7/13 7/17 7/20 7/25 7/27 7/31 8/3        STM DK TH DK TH DK DK DK DK TH DK        IASTM DK  DK TH DK DK DK DK  DK        LE stretching  TH                                  Neuro Re-Ed                  PPT 5" x10                 TA ball press                  Supine hip ADD ball sque 5" x20                 Supine BKFO Supine clams GTB x10 Supine clams  GTB  x10 ea  S/L  Clams  10x2 R S/L  Clams  10x2 R S/L  Clams  10x2 R RTB S/L  Clams  x20 R RTB S/L  Clams  x20 R RTB  S/L  Clams  x20 R RTB  S/L  Clams  x20 R        Wobble Board AP, Lat  x2' ea 2' ea  2' ea 2' ea W/ Foam x2' ea   2' ea W/ foam x2' ea        SLS  2x30"  ea  2x30" ea      trampoline GMB toss 2x10ea Cone Taps 2 floor, FWD, LAT R SL x10ea     2 foam FWD alt x10ea 2 foam FWD alt x10ea           Glut squeeze   Bridge x10 Bridge  10x W/ ball squeeze x10             Tandem balance on foam    2x30" ea trampoline ball toss x10ea     trampoline GMB toss x10ea        BOSU Step ups       FWD x10 R FWD w/ march x10 R                                              Ther Ex                  Supine Hip Flexor                  Butterfly stretch                  LTRs no ball Alt x10ea  Alt x10ea               Seated HS stretch  unable                L/S Ext stretch                  Heel raises                  Standing Hip ABD B/L  10x2 ea                 Standing Hip Ext B/L  10x2 ea                                                                       Ther Activity                  Bike for LE mobility and endurance Upright  5' Upright  5'  Upright  5' Upright  5' Upright  5' Upright  5' Upright  5' Upright  5' Upright  5'        Side stepping  3x ea at mirror  3x ea at mirror              Step Ups 6" step up x10    Ups/downs 6" x10ea Ups/downs 6" x10ea            Leg Press  60# 10x  60# 10x2  60# 10x2 60# 10x2 60# 10x2; SL 30# x10 70#  10x2  SL 30#  10x2 70#  10x2, SL 30#  10x2         TRX Squats      x15 2x10 2x10 2x10 2x10 for ecc control        [place] Walkouts      FWD/BWD #15 x5 laps  FWD/BWD #15 x5 laps, LAT x2 ea                            X walks       RTB @ blue line x1 lap  YTB  2x at blue line RTB @ blue line x2 laps                                            Pt Ed    HEP              Re-Evaluation   DK               Modalities                  Heat/ice (PRN) Ice 10' Ice 10' Ice 10' Ice 10' Ice 10' Ice 10' Ice 10' Ice 10' Ice 10' Ice 10'        TENS

## 2023-08-07 ENCOUNTER — OFFICE VISIT (OUTPATIENT)
Dept: PHYSICAL THERAPY | Facility: CLINIC | Age: 33
End: 2023-08-07
Payer: COMMERCIAL

## 2023-08-07 DIAGNOSIS — M25.551 PAIN OF RIGHT HIP: Primary | ICD-10-CM

## 2023-08-07 PROCEDURE — 97112 NEUROMUSCULAR REEDUCATION: CPT

## 2023-08-07 PROCEDURE — 97110 THERAPEUTIC EXERCISES: CPT

## 2023-08-07 PROCEDURE — 97530 THERAPEUTIC ACTIVITIES: CPT

## 2023-08-07 NOTE — PROGRESS NOTES
Daily Note     Today's date: 2023  Patient name: Kay Rousseau  : 1990  MRN: [id number]  Referring provider: Edith Putnam DO  Dx:   Encounter Diagnosis     ICD-10-CM    1. Pain of right hip  M25.551                      Subjective: Pt states that she was able to go to the aquarium on Friday and did fine with the drive and walking, she just was careful and took her time. Pt reports having some increased sciatica pain on the opposite side but she's been doing some stretching for that. Objective: See treatment diary below      Assessment: Tolerated treatment well. Continued with outlined program and progressed strengthening where able. Pt challenged with progressions and muscle fatigue is noted. Cues for form needed at times with improved carryover noted. No increased sx's noted throughout on R, although pt does note some increased pain on the L. Discussed HEP importance with pt reporting understanding. Will progress as able.       Plan: Continue per plan of care     Diagnosis: Right hip pain  Precautions: GERD, anxiety/depression  ( * asterisk indicates given per HEP)  Next Physician Appointment:   Loran Merlin:     Manuals 7/3 7/6 7/10 7/13 7/17 7/20 7/25 7/27 7/31 8/3 8/7       STM DK TH DK TH DK DK DK DK TH DK TH       IASTM DK  DK TH DK DK DK DK  DK        LE stretching  TH                                  Neuro Re-Ed                  PPT 5" x10                 TA ball press                  Supine hip ADD ball sque 5" x20                 Supine BKFO Supine clams GTB x10 Supine clams  GTB  x10 ea  S/L  Clams  10x2 R S/L  Clams  10x2 R S/L  Clams  10x2 R RTB S/L  Clams  x20 R RTB S/L  Clams  x20 R RTB  S/L  Clams  x20 R RTB  S/L  Clams  x20 R        Wobble Board AP, Lat  x2' ea 2' ea  2' ea 2' ea W/ Foam x2' ea   2' ea W/ foam x2' ea w/foam  x2'ea       SLS  2x30"  ea  2x30" ea      trampoline GMB toss 2x10ea        Cone Taps 2 floor, FWD, LAT R SL x10ea     2 foam FWD alt x10ea 2 foam FWD alt x10ea           Glut squeeze   Bridge x10 Bridge  10x W/ ball squeeze x10             Tandem balance on foam    2x30" ea trampoline ball toss x10ea     trampoline GMB toss x10ea        BOSU Step ups       FWD x10 R FWD w/ march x10 R                                              Ther Ex                  Supine Hip Flexor                  Butterfly stretch                  LTRs no ball Alt x10ea  Alt x10ea               Seated HS stretch  unable                L/S Ext stretch                  Heel raises                  Standing Hip ABD B/L  10x2 ea                 Standing Hip Ext B/L  10x2 ea                                                                       Ther Activity                  Bike for LE mobility and endurance Upright  5' Upright  5'  Upright  5' Upright  5' Upright  5' Upright  5' Upright  5' Upright  5' Upright  5' Upright  5'       Side stepping  3x ea at mirror  3x ea at mirror              Step Ups 6" step up x10    Ups/downs 6" x10ea Ups/downs 6" x10ea     Step up  6" 10xea       Leg Press  60# 10x  60# 10x2  60# 10x2 60# 10x2 60# 10x2; SL 30# x10 70#  10x2  SL 30#  10x2 70#  10x2, SL 30#  10x2  75#  10x2  SL 35#  10x2       TRX Squats      x15 2x10 2x10 2x10 2x10 for ecc control        [place] Walkouts      FWD/BWD #15 x5 laps  FWD/BWD #15 x5 laps, LAT x2 ea                            X walks       RTB @ blue line x1 lap  YTB  2x at blue line RTB @ blue line x2 laps RTB @ blue line2x                                           Pt Ed    HEP              Re-Evaluation   DK               Modalities                  Heat/ice (PRN) Ice 10' Ice 10' Ice 10' Ice 10' Ice 10' Ice 10' Ice 10' Ice 10' Ice 10' Ice 10' Ice 10'       TENS

## 2023-08-09 ENCOUNTER — TELEPHONE (OUTPATIENT)
Dept: FAMILY MEDICINE CLINIC | Facility: CLINIC | Age: 33
End: 2023-08-09

## 2023-08-10 ENCOUNTER — OFFICE VISIT (OUTPATIENT)
Dept: PHYSICAL THERAPY | Facility: CLINIC | Age: 33
End: 2023-08-10
Payer: COMMERCIAL

## 2023-08-10 DIAGNOSIS — M25.551 PAIN OF RIGHT HIP: Primary | ICD-10-CM

## 2023-08-10 PROCEDURE — 97530 THERAPEUTIC ACTIVITIES: CPT

## 2023-08-10 PROCEDURE — 97112 NEUROMUSCULAR REEDUCATION: CPT

## 2023-08-10 PROCEDURE — 97140 MANUAL THERAPY 1/> REGIONS: CPT

## 2023-08-10 NOTE — PROGRESS NOTES
Daily Note     Today's date: 8/10/2023  Patient name: Jorge Smith  : 1990  MRN: [id number]  Referring provider: Beti Lee DO  Dx:   Encounter Diagnosis     ICD-10-CM    1. Pain of right hip  M25.551           Start Time: 1045  Stop Time: 1140  Total time in clinic (min): 55 minutes    Subjective: Patient reports she has been feeling better since she started PT. She reports she is able to move her Right hip better; she still notices some stiffness btu has improved      Objective: See treatment diary below      Assessment: Tolerated treatment well. Initiated session with Leg press and TRX exercises. Added lateral lunges to progress quad and glute control. Performed step up and march on BOSU with good control and improved ability to perform on Right LE. Completed exercises today with good tolerance and form, without increase in pain. Patient exhibited good technique with therapeutic exercises and would benefit from continued PT      Plan: Continue per plan of care. Progress treatment as tolerated.        Diagnosis: Right hip pain  Precautions: GERD, anxiety/depression  ( * asterisk indicates given per HEP)  Next Physician Appointment:   Heaven Stokess:     Manuals 7/20 7/25 7/27 7/31 8/3 8/7 8/10          STM DK DK DK TH DK TH           IASTM DK DK DK  DK  DK          LE stretching                                  Neuro Re-Ed                 PPT                 TA ball press                 Supine hip ADD ball sque                 Supine BKFO S/L  Clams  10x2 R RTB S/L  Clams  x20 R RTB S/L  Clams  x20 R RTB  S/L  Clams  x20 R RTB  S/L  Clams  x20 R            Wobble Board W/ Foam x2' ea   2' ea W/ foam x2' ea w/foam  x2'ea w/foam  x2'ea          SLS     trampoline GMB toss 2x10ea            Cone Taps 2 foam FWD alt x10ea 2 foam FWD alt x10ea               Glut squeeze                 Tandem balance on foam     trampoline GMB toss x10ea            BOSU Step ups  FWD x10 R FWD w/ march x10 R    FWD w/ march alt x10ea                                            Ther Ex                 Supine Hip Flexor                 Butterfly stretch                 LTRs no ball                 Seated HS stretch                 L/S Ext stretch                 Heel raises                 Standing Hip ABD                 Standing Hip Ext                                                                    Ther Activity                 Bike for LE mobility and endurance Upright  5' Upright  5' Upright  5' Upright  5' Upright  5' Upright  5' Upright  5'          Side stepping                 Step Ups Ups/downs 6" x10ea     Step up  6" 10xea           Leg Press 60# 10x2 60# 10x2 60# 10x2; SL 30# x10 70#  10x2  SL 30#  10x2 70#  10x2, SL 30#  10x2  75#  10x2  SL 35#  10x2 75#  10x2  SL 35#  10x2          TRX Squats x15 2x10 2x10 2x10 2x10 for ecc control  2x10 for ecc control          TRX Lateral Lunges       Alt x10ea          [place] Walkouts FWD/BWD #15 x5 laps  FWD/BWD #15 x5 laps, LAT x2 ea                               X walks  RTB @ blue line x1 lap  YTB  2x at blue line RTB @ blue line x2 laps RTB @ blue line2x GTB @ blue line x2                                            Pt Ed                 Re-Evaluation                 Modalities                 Heat/ice (PRN) Ice 10' Ice 10' Ice 10' Ice 10' Ice 10' Ice 10' Ice 10'          TENS 5

## 2023-08-14 ENCOUNTER — EVALUATION (OUTPATIENT)
Dept: PHYSICAL THERAPY | Facility: CLINIC | Age: 33
End: 2023-08-14
Payer: COMMERCIAL

## 2023-08-14 ENCOUNTER — TELEPHONE (OUTPATIENT)
Dept: FAMILY MEDICINE CLINIC | Facility: CLINIC | Age: 33
End: 2023-08-14

## 2023-08-14 DIAGNOSIS — M25.551 PAIN OF RIGHT HIP: Primary | ICD-10-CM

## 2023-08-14 PROCEDURE — 97530 THERAPEUTIC ACTIVITIES: CPT

## 2023-08-14 PROCEDURE — 97140 MANUAL THERAPY 1/> REGIONS: CPT

## 2023-08-14 NOTE — PROGRESS NOTES
PT Re-Evaluation     Today's date: 2023  Patient name: Judy Geller  : 1990  MRN: [id number]  Referring provider: Sherryle Babe, DO  Dx:   Encounter Diagnosis     ICD-10-CM    1. Pain of right hip  M25.551           Start Time: 1045  Stop Time: 1130  Total time in clinic (min): 45 minutes    Assessment  Assessment details: Patient is a 34 y.o. female who presents to outpatient PT with reports of Right hip pain that started about 6 months ago. Patient received an x-ray about 2 weeks ago that was unremarkable for osseous abnormalities. Patient arrives for re-evaluation today with improving pain levels to 5/10 at worst. She reports continued sore/achy pain, with occasional sharp pain with elevating Right LE / marching, etc. This affects her ability to perform LE dressing, stair negotiation, and other activities that require Right hip flexion. Scour, LEONARDA, and FADIR testing continue to provoke pain in anterior hip (deep). Improving hip extension and hip abduction/ER strength. She continues to have pain with supine SLR with some weakness noted. Standing hip marching is improving, but some limitations in motion due to pain. She continues to have pain and difficulty with prolonged sitting and stair negotiation. Prolonged standing and walking has improved tolerance since IE. She continues to have some muscle fatigue with prolonged activity, with muscle soreness reported that reduces with rest and cold pack. Patient's current limitations continue to affect her ability to perform most daily activities including stair negotiation, squatting, bending forward, sit-stands, getting in/out of car, walking, standing, and prolonged sitting. This impacts ease with her ADLs and functional tasks. She lives with her parents and sister in a 2-story home, and thus has to navigate stairs frequently in the day. Patient has overall reported improved tolerance and positive response to PT interventions and exercises thus far. Patient will benefit from continued skilled PT services to improve her pain levels, improve mobility, improve strength, and improve overall ease with ADLs to progress towards her PLOF. Patient would benefit from skilled PT services to address these impairments and to maximize function. Please note that patient was recommended to contact her PCP for possible MRI of Right hip due to continued anterior deep hip pain with provocation of symptoms as mentioned above, to rule in/out possible labral pathology. Thank you for the referral.  Impairments: abnormal gait, abnormal or restricted ROM, activity intolerance, impaired balance, impaired physical strength, lacks appropriate home exercise program, pain with function, weight-bearing intolerance, poor posture  and poor body mechanics  Functional limitations: ascending>descending, squatting, bending forward, sit-stands, getting in/out of car, walking, standing, prolonged sittingBarriers to therapy: Chronicity of symptoms  Understanding of Dx/Px/POC: good   Prognosis: good    Goals  Impairment Goals 4-6 weeks   In order to maximize function patient will be able to. ..   - Decrease intensity/duration/frequency of pain to 4/10. Improved  - Demonstrate symmetrical hip AROM without pain. Improving  - Increase hip strength to 4/5 throughout to improve mechanics and stability with walking, standing, etc. Improving  - Demonstrate improved hip flexibility as demonstrated by increased ROM through therapeutic exercise. Improving, continues to have difficulty with HS flexibility / stretching    Functional Goals 6-8 weeks  In order to return to prior level of function patient will be able to. ..   - Participate in ADL's/IADL's/sport specific activities with no greater than 2/10 pain. Improved  - Increase Functional Status Measure (FOTO) to: anticipated at discharge. Improved  - Demonstrate independence and compliant with HEP.  Met  - Demonstrate a squat and or sit to stand with good mechanics and eccentric control without pain/difficulty/compensation. Improved  - Demonstrate functional activities with good core and glute strength without compensation/pain/difficulty. Improved   - Ascend and descend stairs without increased pain/compensation/difficulty and a reciprocal gait pattern. Slightly improved     Plan  Patient would benefit from: skilled PT  Planned modality interventions: cryotherapy  Other planned modality interventions: moist heat  Planned therapy interventions: joint mobilization, manual therapy, neuromuscular re-education, patient education, strengthening, stretching, therapeutic activities, therapeutic exercise, home exercise program, functional ROM exercises, Cristina taping, postural training, balance/weight bearing training, body mechanics training, flexibility and massage  Frequency: 1-2x/week. Duration in weeks: 4  Treatment plan discussed with: patient, PTA and referring physician        Subjective Evaluation    History of Present Illness  Mechanism of injury: Edita Fallon is a 34y.o. year-old female who presents to outpatient PT with reports of Right hip pain that started 6 months ago. Patient reports history of fall about 2 years ago, however mild pain at the time that reduced quickly. She reports no known RIANA of recent pain. She reports she started to notice it while putting on shoes/socks and performing bending forward activities. Patient saw her PCP on 2023 and was recommended for PT at this time. Patient did not receive any imaging.    Quality of life: good    Patient Goals  Patient goals for therapy: decreased pain, increased motion, improved balance, increased strength, independence with ADLs/IADLs and return to sport/leisure activities    Pain  Current pain ratin  At best pain ratin  At worst pain ratin  Location: Right hip  Quality: dull ache and sharp  Relieving factors: rest and heat  Aggravating factors: sitting, standing, stair climbing, walking and lifting  Progression: no change    Social Support  Steps to enter house: yes  Stairs in house: yes   Lives in: multiple-level home  Lives with: parents    Employment status: not working  Exercise history: walking      Diagnostic Tests  No diagnostic tests performed  Treatments  Current treatment: physical therapy        Objective     Observations     Additional Observation Details  Standing posture: Right hip, patella, and medial malleolus slightly lower than Left  Gait Assessment: slight Right hip drop; increased Right trunk rotation    Lumbar Screen  Lumbar range of motion within normal limits with the following exceptions:Flexion: WNLs  Extension: WNLs with tightness  Side-bending and Rotation: WNLs; mild LBP with Left SBing    Active Range of Motion   Left Hip   Normal active range of motion    Right Hip   Flexion: 100 degrees with pain  Extension: 10 degrees   Abduction: 30 degrees   External rotation (90/90): 40 degrees with pain  Internal rotation (90/90): WFL and with pain    Strength/Myotome Testing     Right Hip   Planes of Motion   Flexion: 4-  Extension: 3+  Abduction: 4-  External rotation: 4-  Internal rotation: 3+    Tests     Right Hip   Positive LEONARDA, FADIR, [place]'s and scour. Negative Justino. Additional Tests Details  LLD (supine): Right leg "longer" than Left  Supine to Long-Sit Test: Right LE long to short (ALS)      Subjective: Patient reports she is feeling better, but still has pain with lifting her Right leg up for stairs and getting her pants/shoes on.        Diagnosis: Right hip pain  Precautions: GERD, anxiety/depression  ( * asterisk indicates given per HEP)  Next Physician Appointment:   Ly Brood:     Manuals 7/20 7/25 7/27 7/31 8/3 8/7 8/10 8/14         [facility] JIA RO  DK   DK         IAS DK JIA RO DK         LE stretching                                  Neuro Re-Ed                 PPT                 TA ball press                 Supine hip ADD ball sque                 Supine BKFO S/L  Clams  10x2 R RTB S/L  Clams  x20 R RTB S/L  Clams  x20 R RTB  S/L  Clams  x20 R RTB  S/L  Clams  x20 R            Wobble Board W/ Foam x2' ea   2' ea W/ foam x2' ea w/foam  x2'ea w/foam  x2'ea          SLS     trampoline GMB toss 2x10ea            Cone Taps 2 foam FWD alt x10ea 2 foam FWD alt x10ea               Glut squeeze                 Tandem balance on foam     trampoline GMB toss x10ea            BOSU Step ups  FWD x10 R FWD w/ march x10 R    FWD w/ march alt x10ea                                            Ther Ex                 Supine Hip Flexor                 Butterfly stretch                 LTRs no ball                 Seated HS stretch                 L/S Ext stretch                 Heel raises                 Standing Hip ABD                 Standing Hip Ext                                                                    Ther Activity                 Bike for LE mobility and endurance Upright  5' Upright  5' Upright  5' Upright  5' Upright  5' Upright  5' Upright  5' Upright  5'         Side stepping                 Step Ups Ups/downs 6" x10ea     Step up  6" 10xea           Leg Press 60# 10x2 60# 10x2 60# 10x2; SL 30# x10 70#  10x2  SL 30#  10x2 70#  10x2, SL 30#  10x2  75#  10x2  SL 35#  10x2 75#  10x2  SL 35#  10x2          TRX Squats x15 2x10 2x10 2x10 2x10 for ecc control  2x10 for ecc control          TRX Lateral Lunges       Alt x10ea          [place] Walkouts FWD/BWD #15 x5 laps  FWD/BWD #15 x5 laps, LAT x2 ea                               X walks  RTB @ blue line x1 lap  YTB  2x at blue line RTB @ blue line x2 laps RTB @ blue line2x GTB @ blue line x2                                            Pt Ed        POC, pain science         Re-Evaluation        DK         Modalities                 Heat/ice (PRN) Ice 10' Ice 10' Ice 10' Ice 10' Ice 10' Ice 10' Ice 10' Ice 10'         TENS

## 2023-08-17 ENCOUNTER — OFFICE VISIT (OUTPATIENT)
Dept: PHYSICAL THERAPY | Facility: CLINIC | Age: 33
End: 2023-08-17
Payer: COMMERCIAL

## 2023-08-17 DIAGNOSIS — M25.551 PAIN OF RIGHT HIP: Primary | ICD-10-CM

## 2023-08-17 PROCEDURE — 97112 NEUROMUSCULAR REEDUCATION: CPT

## 2023-08-17 PROCEDURE — 97140 MANUAL THERAPY 1/> REGIONS: CPT

## 2023-08-17 PROCEDURE — 97110 THERAPEUTIC EXERCISES: CPT

## 2023-08-17 NOTE — PROGRESS NOTES
Daily Note     Today's date: 2023  Patient name: Fadi Acuna  : 1990  MRN: [id number]  Referring provider: Amirah Fernando DO  Dx:   Encounter Diagnosis     ICD-10-CM    1. Pain of right hip  M25.551                      Subjective: Pt states that she is doing better today than the other day. Her hip is still sore and some days are worse than others. Pt states that she is scheduled for an MRI next week. Objective: See treatment diary below      Assessment: Tolerated treatment well. Continued with progressions in strengthening from last visits. Pt challenged with TRX squats but uses good form and has no increased sx's. Muscle fatigue is noted. No pain with lateral step up and over. Tenderness noted in hip flexors during STM with decreased pain after. Pt progressing slowly towards her goals and will benefit from continued therapy.       Plan: Continue per plan of care     Diagnosis: Right hip pain  Precautions: GERD, anxiety/depression  ( * asterisk indicates given per HEP)  Next Physician Appointment:   Sudheer Sadler:     Manuals 7/20 7/25 7/27 7/31 8/3 8/7 8/10 8/14 8/17        STM DK DK DK TH DK TH  DK TH        IASTM DK DK DK  DK  DK DK TH        LE stretching                                  Neuro Re-Ed                 PPT                 TA ball press                 Supine hip ADD ball sque                 Supine BKFO S/L  Clams  10x2 R RTB S/L  Clams  x20 R RTB S/L  Clams  x20 R RTB  S/L  Clams  x20 R RTB  S/L  Clams  x20 R            Wobble Board W/ Foam x2' ea   2' ea W/ foam x2' ea w/foam  x2'ea w/foam  x2'ea  2' ea        SLS     trampoline GMB toss 2x10ea    On foam  3x20"        Cone Taps 2 foam FWD alt x10ea 2 foam FWD alt x10ea               Glut squeeze                 Tandem balance on foam     trampoline GMB toss x10ea            BOSU Step ups  FWD x10 R FWD / march x10 R    FWD / march alt x10ea                                            Ther Ex Supine Hip Flexor                 Butterfly stretch                 LTRs no ball                 Seated HS stretch                 L/S Ext stretch                 Heel raises                 Standing Hip ABD                 Standing Hip Ext                                                                    Ther Activity                 Bike for LE mobility and endurance Upright  5' Upright  5' Upright  5' Upright  5' Upright  5' Upright  5' Upright  5' Upright  5' Upright   5'        Side stepping                 Step Ups Ups/downs 6" x10ea     Step up  6" 10xea           Leg Press 60# 10x2 60# 10x2 60# 10x2; SL 30# x10 70#  10x2  SL 30#  10x2 70#  10x2, SL 30#  10x2  75#  10x2  SL 35#  10x2 75#  10x2  SL 35#  10x2  75#  10x2  SL 35#  10x2        TRX Squats x15 2x10 2x10 2x10 2x10 for ecc control  2x10 for ecc control  2x10 for ecc control        TRX Lateral Lunges       Alt x10ea          [place] Walkouts FWD/BWD #15 x5 laps  FWD/BWD #15 x5 laps, LAT x2 ea              Lateral step overs         6" 2x10        X walks  RTB @ blue line x1 lap  YTB  2x at blue line RTB @ blue line x2 laps RTB @ blue line2x GTB @ blue line x2                                            Pt Ed        POC, pain science         Re-Evaluation        DK         Modalities                 Heat/ice (PRN) Ice 10' Ice 10' Ice 10' Ice 10' Ice 10' Ice 10' Ice 10' Ice 10' Ice 10'        TENS

## 2023-08-21 ENCOUNTER — OFFICE VISIT (OUTPATIENT)
Dept: PHYSICAL THERAPY | Facility: CLINIC | Age: 33
End: 2023-08-21
Payer: COMMERCIAL

## 2023-08-21 DIAGNOSIS — M25.551 PAIN OF RIGHT HIP: Primary | ICD-10-CM

## 2023-08-21 PROCEDURE — 97530 THERAPEUTIC ACTIVITIES: CPT

## 2023-08-21 PROCEDURE — 97112 NEUROMUSCULAR REEDUCATION: CPT

## 2023-08-21 PROCEDURE — 97140 MANUAL THERAPY 1/> REGIONS: CPT

## 2023-08-21 NOTE — PROGRESS NOTES
Daily Note     Today's date: 2023  Patient name: Sudheer Metcafl  : 1990  MRN: [id number]  Referring provider: Salena Villalba DO  Dx:   Encounter Diagnosis     ICD-10-CM    1. Pain of right hip  M25.551           Start Time: 1045  Stop Time: 1130  Total time in clinic (min): 45 minutes    Subjective: Patient reports she had a muscle spasms in her inner thigh last week, but it got better the next day. Objective: See treatment diary below      Assessment: Tolerated treatment well. Initiated session with Standing LE strengthening and balance exercises without increase in pain reported. TRX squats performed to tolerance with proper form without increase in pain. Focused on proper quad and glute control with exercises, to avoid straining. STM end of session to anterior thigh to address tenderness. Patient deferred hip flexor stretch due to pain. Ended session with cold pack with positive response. Patient exhibited good technique with therapeutic exercises and would benefit from continued PT      Plan: Continue per plan of care. Progress treatment as tolerated.        Diagnosis: Right hip pain  Precautions: GERD, anxiety/depression  ( * asterisk indicates given per HEP)  Next Physician Appointment:   Tana Middletonr:     Manuals 8/3 8/7 8/10 8/14 8/17 8/21           STM DK TH  DK TH DK           IASTM DK  DK DK TH DK           LE stretching                                  Neuro Re-Ed                 PPT                 TA ball press                 Supine hip ADD ball sque                 Supine BKFO RTB  S/L  Clams  x20 R                Wobble Board W/ foam x2' ea w/foam  x2'ea w/foam  x2'ea  2' ea w/foam  x2'ea           SLS trampoline GMB toss 2x10ea    On foam  3x20"            Cone Taps                 Glut squeeze                 Tandem balance on foam trampoline GMB toss x10ea                BOSU Step ups   FWD / march alt x10ea                                                Ther Ex Supine Hip Flexor                 Butterfly stretch                 LTRs no ball                 Seated HS stretch                 L/S Ext stretch                 Heel raises                 Standing Hip ABD                 Standing Hip Ext                                                                    Ther Activity                 Bike for LE mobility and endurance Upright  5' Upright  5' Upright  5' Upright  5' Upright   5' Upright   5'           Side stepping                 Step Ups  Step up  6" 10xea               Leg Press 70#  10x2, SL 30#  10x2  75#  10x2  SL 35#  10x2 75#  10x2  SL 35#  10x2  75#  10x2  SL 35#  10x2            TRX Squats 2x10 for ecc control  2x10 for ecc control  2x10 for ecc control 2x10 for ecc control           TRX Lateral Lunges   Alt x10ea   Alt x10ea           [place] Walkouts      F/B 15# x5 laps           Lateral step overs     6" 2x10            X walks RTB @ blue line x2 laps RTB @ blue line2x GTB @ blue line x2   GTB @ blue line x2                                             Pt Ed    POC, pain science  POC, pain science           Re-Evaluation    DK             Modalities                 Heat/ice (PRN) Ice 10' Ice 10' Ice 10' Ice 10' Ice 10' Ice 10'           TENS

## 2023-08-24 ENCOUNTER — HOSPITAL ENCOUNTER (OUTPATIENT)
Dept: MRI IMAGING | Facility: HOSPITAL | Age: 33
End: 2023-08-24
Payer: COMMERCIAL

## 2023-08-24 ENCOUNTER — OFFICE VISIT (OUTPATIENT)
Dept: PHYSICAL THERAPY | Facility: CLINIC | Age: 33
End: 2023-08-24
Payer: COMMERCIAL

## 2023-08-24 DIAGNOSIS — M25.551 PAIN OF RIGHT HIP: ICD-10-CM

## 2023-08-24 DIAGNOSIS — M25.551 PAIN OF RIGHT HIP: Primary | ICD-10-CM

## 2023-08-24 PROCEDURE — 97112 NEUROMUSCULAR REEDUCATION: CPT

## 2023-08-24 PROCEDURE — 73721 MRI JNT OF LWR EXTRE W/O DYE: CPT

## 2023-08-24 PROCEDURE — 97140 MANUAL THERAPY 1/> REGIONS: CPT

## 2023-08-24 PROCEDURE — 97110 THERAPEUTIC EXERCISES: CPT

## 2023-08-24 PROCEDURE — G1004 CDSM NDSC: HCPCS

## 2023-08-24 NOTE — PROGRESS NOTES
Daily Note     Today's date: 2023  Patient name: Marlon Friend  : 1990  MRN: [id number]  Referring provider: Dalton Ugarte DO  Dx:   Encounter Diagnosis     ICD-10-CM    1. Pain of right hip  M25.551                      Subjective:  Pt states that she continues with R hip pain, especially with lifting it. Pt reports that she has off and on sciatic pain on the L. Pt had her MRI done this morning for her hip. Objective: See treatment diary below      Assessment: Tolerated treatment well. Progressed pt with increased reps on leg press with no pain sx's noted. Pt challenged with mini TRX squats and requires cues for equal weight bearing as she leans L>R. Muscle fatigue is noted with x-walks. Discussed HEP and importance with pt reporting understanding. Will progress as able after pt's MRI results are assessed by PCP.         Plan: Continue per plan of care     Diagnosis: Right hip pain  Precautions: GERD, anxiety/depression  ( * asterisk indicates given per HEP)  Next Physician Appointment:   Apoorva Pretty:     Manuals 8/3 8/7 8/10 8/14 8/17 8/21 8/24          STM DK TH  DK TH DK TH          IASTM DK  DK DK TH DK           LE stretching                                  Neuro Re-Ed                 PPT                 TA ball press                 Supine hip ADD ball sque                 Supine BKFO RTB  S/L  Clams  x20 R                Wobble Board W/ foam x2' ea w/foam  x2'ea w/foam  x2'ea  2' ea w/foam  x2'ea W/foam  x2' ea          SLS trampoline GMB toss 2x10ea    On foam  3x20"  On foam  3x20"          Cone Taps                 Glut squeeze                 Tandem balance on foam trampoline GMB toss x10ea                BOSU Step ups   FWD / march alt x10ea                                                Ther Ex                 Supine Hip Flexor                 Butterfly stretch                 LTRs no ball                 Seated HS stretch                 L/S Ext stretch Heel raises                 Standing Hip ABD                 Standing Hip Ext                                                                    Ther Activity                 Bike for LE mobility and endurance Upright  5' Upright  5' Upright  5' Upright  5' Upright   5' Upright   5' Upright 5'          Side stepping                 Step Ups  Step up  6" 10xea               Leg Press 70#  10x2, SL 30#  10x2  75#  10x2  SL 35#  10x2 75#  10x2  SL 35#  10x2  75#  10x2  SL 35#  10x2  75#  10x  SL 35#  10x2          TRX Squats 2x10 for ecc control  2x10 for ecc control  2x10 for ecc control 2x10 for ecc control 2x10 for ecc control          TRX Lateral Lunges   Alt x10ea   Alt x10ea Alt  x10 ea          [place] Walkouts      F/B 15# x5 laps           Lateral step overs     6" 2x10            X walks RTB @ blue line x2 laps RTB @ blue line2x GTB @ blue line x2   GTB @ blue line x2 GTB @ blue line x2                                            Pt Ed    POC, pain science  POC, pain science HEP          Re-Evaluation    DK             Modalities                 Heat/ice (PRN) Ice 10' Ice 10' Ice 10' Ice 10' Ice 10' Ice 10' Ice 10'          TENS

## 2023-08-30 ENCOUNTER — TELEPHONE (OUTPATIENT)
Dept: FAMILY MEDICINE CLINIC | Facility: CLINIC | Age: 33
End: 2023-08-30

## 2023-08-30 DIAGNOSIS — M25.551 PAIN OF RIGHT HIP: Primary | ICD-10-CM

## 2023-08-30 NOTE — TELEPHONE ENCOUNTER
----- Message from Dalton Ugarte DO sent at 8/30/2023  1:08 PM EDT -----  Right hip anterosuperior labral tear - referral for Ortho in chart  Also noted to have L-ovarian cyst - could be ovulating - cont to monitor

## 2023-09-11 ENCOUNTER — APPOINTMENT (OUTPATIENT)
Dept: PHYSICAL THERAPY | Facility: CLINIC | Age: 33
End: 2023-09-11
Payer: COMMERCIAL

## 2023-09-14 ENCOUNTER — APPOINTMENT (OUTPATIENT)
Dept: PHYSICAL THERAPY | Facility: CLINIC | Age: 33
End: 2023-09-14
Payer: COMMERCIAL

## 2023-09-15 ENCOUNTER — OFFICE VISIT (OUTPATIENT)
Dept: OBGYN CLINIC | Facility: CLINIC | Age: 33
End: 2023-09-15
Payer: COMMERCIAL

## 2023-09-15 VITALS
DIASTOLIC BLOOD PRESSURE: 81 MMHG | BODY MASS INDEX: 43.23 KG/M2 | HEART RATE: 73 BPM | WEIGHT: 269 LBS | HEIGHT: 66 IN | SYSTOLIC BLOOD PRESSURE: 122 MMHG

## 2023-09-15 DIAGNOSIS — S73.191A TEAR OF RIGHT ACETABULAR LABRUM, INITIAL ENCOUNTER: Primary | ICD-10-CM

## 2023-09-15 DIAGNOSIS — M70.61 TROCHANTERIC BURSITIS OF RIGHT HIP: ICD-10-CM

## 2023-09-15 DIAGNOSIS — M25.551 PAIN OF RIGHT HIP: ICD-10-CM

## 2023-09-15 PROCEDURE — 99244 OFF/OP CNSLTJ NEW/EST MOD 40: CPT | Performed by: STUDENT IN AN ORGANIZED HEALTH CARE EDUCATION/TRAINING PROGRAM

## 2023-09-15 NOTE — PROGRESS NOTES
Ortho Sports Medicine New Patient Hip Visit    Assesment:   34 y.o. female right hip greater trochanter bursitis and anterosuperior labral tear    Plan:  The patient's diagnosis and treatment were discussed at length today. We discussed no treatment, non-operative treatment, and operative treatment. Marlon Fletcher presents today for ongoing right hip pain that is multifactorial nature including anterosuperior labral tear, trochanteric bursitis, and likely some contribution from her piriformis/SI region. She has made significant improvements with physical therapy however I recommend that due to ongoing pain and discomfort we consider a ultrasound-guided hip injection as well as ultrasound-guided trochanteric bursal injection for both diagnostic and therapeutic purposes. She is in agreement with this plan and will refer to Dr. Addy Gomez for procedures. She is to follow-up after visit with Dr. Addy Gomez for further treatment recommendations. Conservative Treatment:     Ice to hip region for 20 minutes at least 1-2 times daily. PT for ROM/strengthening to hip, back, legs and core  OTC NSAIDS prn for pain. Low impact exercises such as biking and swimming were encouraged  Referral to Dr. Addy Gomez provided for ultrasound-guided intra-articular and greater trochanteric cortisone injections. Imaging: All imaging from today was reviewed by myself and explained to the patient. Injection:      I will refer the patient for an ultrasound guided corticosteroid injection with Dr. Addy Gomez      Surgery:    No surgery is recommended at this point, continue with conservative treatment plan as noted. Follow up:    No follow-ups on file. Chief Complaint   Patient presents with   • Right Hip - Pain       History of Present Illness: The patient is a 34 y.o. female referred to me by their primary care physician, seen in clinic for evaluation of right hip pain.       Pain is located to the groin with occasional radiation down the anterior thigh. The pain has been present for 1 year. She does not recall any specific injury or trauma. .  The pain is present daily. Pain is improved by rest and physical therapy. Pain is aggravated by weight bearing and walking. The patient has tried rest and physical therapy. Hip Surgical History:  None      Past Medical, Social and Family History:  Past Medical History:   Diagnosis Date   • Anxiety    • Constipation    • Depression    • Diarrhea    • GERD (gastroesophageal reflux disease)    • Hypothyroidism    • IBS (irritable bowel syndrome)    • Nausea    • Panic attacks    • Rash    • Varicella      Past Surgical History:   Procedure Laterality Date   • WISDOM TOOTH EXTRACTION       Allergies   Allergen Reactions   • Other Shortness Of Breath     almonds   • Strawberry C [Ascorbate - Food Allergy] GI Intolerance     Current Outpatient Medications on File Prior to Visit   Medication Sig Dispense Refill   • Cholecalciferol 1.25 MG (91186 UT) TABS Take 1 tablet (50,000 Units total) by mouth every 7 days x12wks and then take OTC Vit D 4000IU QD (Patient taking differently: Take 5,000 Units by mouth daily od) 12 tablet 0   • fluticasone (FLONASE) 50 mcg/act nasal spray 1 spray into each nostril daily 11.1 mL 0   • levothyroxine 125 mcg tablet Take 125 mcg by mouth daily       • naproxen (NAPROSYN) 500 mg tablet Take 1 tablet (500 mg total) by mouth 2 (two) times a day with meals 28 tablet 0   • ferrous sulfate 325 (65 Fe) mg tablet Take by mouth daily with breakfast (Patient not taking: Reported on 7/12/2023)     • ketoconazole (NIZORAL) 2 % cream Apply topically daily (Patient not taking: Reported on 6/2/2023) 15 g 0     No current facility-administered medications on file prior to visit.      Social History     Socioeconomic History   • Marital status: Single     Spouse name: Not on file   • Number of children: Not on file   • Years of education: Not on file   • Highest education level: Not on file   Occupational History   • Not on file   Tobacco Use   • Smoking status: Never   • Smokeless tobacco: Never   Vaping Use   • Vaping Use: Never used   Substance and Sexual Activity   • Alcohol use: Not Currently   • Drug use: Never   • Sexual activity: Never   Other Topics Concern   • Not on file   Social History Narrative    Brother  in 2017      Social Determinants of Health     Financial Resource Strain: Not on file   Food Insecurity: Not on file   Transportation Needs: Not on file   Physical Activity: Not on file   Stress: Not on file   Social Connections: Not on file   Intimate Partner Violence: Not on file   Housing Stability: Not on file         I have reviewed the past medical, surgical, social and family history, medications and allergies as documented in the EMR. Review of systems: ROS is negative other than that noted in the HPI. Constitutional: Negative for fatigue and fever. HENT: Negative for sore throat. Respiratory: Negative for shortness of breath. Cardiovascular: Negative for chest pain. Gastrointestinal: Negative for abdominal pain. Endocrine: Negative for cold intolerance and heat intolerance. Genitourinary: Negative for flank pain. Musculoskeletal: Negative for back pain. Skin: Negative for rash. Allergic/Immunologic: Negative for immunocompromised state. Neurological: Negative for dizziness. Psychiatric/Behavioral: Negative for agitation. Physical Exam:    Blood pressure 122/81, pulse 73, height 5' 6" (1.676 m), weight 122 kg (269 lb).     General/Constitutional: NAD, well developed, well nourished  HENT: Normocephalic, atraumatic  CV: Intact distal pulses, regular rate  Resp: No respiratory distress or labored breathing  Abd: No abdominal distention  Lymphatic: No lymphadenopathy palpated  Neuro: Alert and Oriented x 3, no focal deficits noted  Psych: Normal mood, normal affect, normal judgement, normal behavior  Skin: Warm, dry, no rashes, no erythema     Hip Exam (focused):    right hip:     No dislocation/deformity  ROM: flexion 110, IR 30, ER 60  Greater troch:tender   IT band: tender  PSIS/piriformis: tender  Purnima test: positive at groin and back  FADDIR: positive  Stitchfield: positive  Abduction: 5/5  AT/GS intact    Back:    No TTP over lumbar spinous processes, paraspinal musculature  Negative SLR     No calf tenderness to palpation bilaterally    LE NV Exam: +2 DP/PT pulses bilaterally  Sensation intact to light touch L2-S1 bilaterally      Hip Imaging:    X-rays of the right hip were reviewed, which demonstrate no acute osseous abnormality. No significant cam or pincer deformity. I have reviewed the radiology report andagree with their impression. MRI of the right hip were reviewed, which demonstrate anterior superior labral tear. No full-thickness chondral loss. No significant cam or pincer deformity. There is evidence of an ovarian cyst.  I have reviewed the radiology report and agree with their impression.       Scribe Attestation    I,:   am acting as a scribe while in the presence of the attending physician.:       I,:   personally performed the services described in this documentation    as scribed in my presence.:

## 2023-09-15 NOTE — LETTER
September 15, 2023     Jamie Hauser DO  [address] [address]    Patient: Roberto Mota   YOB: 1990   Date of Visit: 9/15/2023       Dear Dr. Elaine Hinojosa:    Thank you for referring Kayley Shelton to me for evaluation. Below are my notes for this consultation. If you have questions, please do not hesitate to call me. I look forward to following your patient along with you. Sincerely,        Tiffany He DO        CC: No Recipients    Tiffany He DO  9/15/2023 10:52 AM  Sign when Signing Visit  Ortho Sports Medicine New Patient Hip Visit    Assesment:   34 y.o. female right hip greater trochanter bursitis and anterosuperior labral tear    Plan:  The patient's diagnosis and treatment were discussed at length today. We discussed no treatment, non-operative treatment, and operative treatment. Jerilyn Burris presents today for ongoing right hip pain that is multifactorial nature including anterosuperior labral tear, trochanteric bursitis, and likely some contribution from her piriformis/SI region. She has made significant improvements with physical therapy however I recommend that due to ongoing pain and discomfort we consider a ultrasound-guided hip injection as well as ultrasound-guided trochanteric bursal injection for both diagnostic and therapeutic purposes. She is in agreement with this plan and will refer to Dr. Jad Tabares for procedures. She is to follow-up after visit with Dr. Jad Tabares for further treatment recommendations. Conservative Treatment:     Ice to hip region for 20 minutes at least 1-2 times daily. PT for ROM/strengthening to hip, back, legs and core  OTC NSAIDS prn for pain. Low impact exercises such as biking and swimming were encouraged  Referral to Dr. Jad Tabares provided for ultrasound-guided intra-articular and greater trochanteric cortisone injections. Imaging:     All imaging from today was reviewed by myself and explained to the patient. Injection:      I will refer the patient for an ultrasound guided corticosteroid injection with Dr. Bairon Curry      Surgery:    No surgery is recommended at this point, continue with conservative treatment plan as noted. Follow up:    No follow-ups on file. Chief Complaint   Patient presents with   • Right Hip - Pain       History of Present Illness: The patient is a 34 y.o. female referred to me by their primary care physician, seen in clinic for evaluation of right hip pain. Pain is located to the groin with occasional radiation down the anterior thigh. The pain has been present for 1 year. She does not recall any specific injury or trauma. .  The pain is present daily. Pain is improved by rest and physical therapy. Pain is aggravated by weight bearing and walking. The patient has tried rest and physical therapy.           Hip Surgical History:  None      Past Medical, Social and Family History:  Past Medical History:   Diagnosis Date   • Anxiety    • Constipation    • Depression    • Diarrhea    • GERD (gastroesophageal reflux disease)    • Hypothyroidism    • IBS (irritable bowel syndrome)    • Nausea    • Panic attacks    • Rash    • Varicella      Past Surgical History:   Procedure Laterality Date   • WISDOM TOOTH EXTRACTION       Allergies   Allergen Reactions   • Other Shortness Of Breath     almonds   • Strawberry C [Ascorbate - Food Allergy] GI Intolerance     Current Outpatient Medications on File Prior to Visit   Medication Sig Dispense Refill   • Cholecalciferol 1.25 MG (64045 UT) TABS Take 1 tablet (50,000 Units total) by mouth every 7 days x12wks and then take OTC Vit D 4000IU QD (Patient taking differently: Take 5,000 Units by mouth daily od) 12 tablet 0   • fluticasone (FLONASE) 50 mcg/act nasal spray 1 spray into each nostril daily 11.1 mL 0   • levothyroxine 125 mcg tablet Take 125 mcg by mouth daily       • naproxen (NAPROSYN) 500 mg tablet Take 1 tablet (500 mg total) by mouth 2 (two) times a day with meals 28 tablet 0   • ferrous sulfate 325 (65 Fe) mg tablet Take by mouth daily with breakfast (Patient not taking: Reported on 2023)     • ketoconazole (NIZORAL) 2 % cream Apply topically daily (Patient not taking: Reported on 2023) 15 g 0     No current facility-administered medications on file prior to visit. Social History     Socioeconomic History   • Marital status: Single     Spouse name: Not on file   • Number of children: Not on file   • Years of education: Not on file   • Highest education level: Not on file   Occupational History   • Not on file   Tobacco Use   • Smoking status: Never   • Smokeless tobacco: Never   Vaping Use   • Vaping Use: Never used   Substance and Sexual Activity   • Alcohol use: Not Currently   • Drug use: Never   • Sexual activity: Never   Other Topics Concern   • Not on file   Social History Narrative    Brother  in 2017      Social Determinants of Health     Financial Resource Strain: Not on file   Food Insecurity: Not on file   Transportation Needs: Not on file   Physical Activity: Not on file   Stress: Not on file   Social Connections: Not on file   Intimate Partner Violence: Not on file   Housing Stability: Not on file         I have reviewed the past medical, surgical, social and family history, medications and allergies as documented in the EMR. Review of systems: ROS is negative other than that noted in the HPI. Constitutional: Negative for fatigue and fever. HENT: Negative for sore throat. Respiratory: Negative for shortness of breath. Cardiovascular: Negative for chest pain. Gastrointestinal: Negative for abdominal pain. Endocrine: Negative for cold intolerance and heat intolerance. Genitourinary: Negative for flank pain. Musculoskeletal: Negative for back pain. Skin: Negative for rash. Allergic/Immunologic: Negative for immunocompromised state.    Neurological: Negative for dizziness. Psychiatric/Behavioral: Negative for agitation. Physical Exam:    Blood pressure 122/81, pulse 73, height 5' 6" (1.676 m), weight 122 kg (269 lb). General/Constitutional: NAD, well developed, well nourished  HENT: Normocephalic, atraumatic  CV: Intact distal pulses, regular rate  Resp: No respiratory distress or labored breathing  Abd: No abdominal distention  Lymphatic: No lymphadenopathy palpated  Neuro: Alert and Oriented x 3, no focal deficits noted  Psych: Normal mood, normal affect, normal judgement, normal behavior  Skin: Warm, dry, no rashes, no erythema     Hip Exam (focused):    right hip:     No dislocation/deformity  ROM: flexion 110, IR 30, ER 60  Greater troch:tender   IT band: tender  PSIS/piriformis: tender  Purnima test: positive at groin and back  FADDIR: positive  Stitchfield: positive  Abduction: 5/5  AT/GS intact    Back:    No TTP over lumbar spinous processes, paraspinal musculature  Negative SLR     No calf tenderness to palpation bilaterally    LE NV Exam: +2 DP/PT pulses bilaterally  Sensation intact to light touch L2-S1 bilaterally      Hip Imaging:    X-rays of the right hip were reviewed, which demonstrate no acute osseous abnormality. No significant cam or pincer deformity. I have reviewed the radiology report andagree with their impression. MRI of the right hip were reviewed, which demonstrate anterior superior labral tear. No full-thickness chondral loss. No significant cam or pincer deformity. There is evidence of an ovarian cyst.  I have reviewed the radiology report and agree with their impression.       Scribe Attestation      I,:   am acting as a scribe while in the presence of the attending physician.:       I,:   personally performed the services described in this documentation    as scribed in my presence.:

## 2023-09-18 ENCOUNTER — TELEPHONE (OUTPATIENT)
Dept: OBGYN CLINIC | Facility: CLINIC | Age: 33
End: 2023-09-18

## 2023-09-18 ENCOUNTER — APPOINTMENT (OUTPATIENT)
Dept: PHYSICAL THERAPY | Facility: CLINIC | Age: 33
End: 2023-09-18
Payer: COMMERCIAL

## 2023-09-18 DIAGNOSIS — N83.202 CYST OF LEFT OVARY: Primary | ICD-10-CM

## 2023-09-18 NOTE — TELEPHONE ENCOUNTER
Pt. Calling for order for pelvic u/s. Recommended in 8-12 weeks d/t ovarian cyst visualized on MRI. Order placed.  Has yearly scheduled in December

## 2023-09-21 ENCOUNTER — EVALUATION (OUTPATIENT)
Dept: PHYSICAL THERAPY | Facility: CLINIC | Age: 33
End: 2023-09-21
Payer: COMMERCIAL

## 2023-09-21 ENCOUNTER — TELEPHONE (OUTPATIENT)
Age: 33
End: 2023-09-21

## 2023-09-21 DIAGNOSIS — M25.551 PAIN OF RIGHT HIP: Primary | ICD-10-CM

## 2023-09-21 PROCEDURE — 97140 MANUAL THERAPY 1/> REGIONS: CPT

## 2023-09-21 PROCEDURE — 97530 THERAPEUTIC ACTIVITIES: CPT

## 2023-09-21 PROCEDURE — 97110 THERAPEUTIC EXERCISES: CPT

## 2023-09-21 NOTE — PROGRESS NOTES
PT Re-Evaluation     Today's date: 2023  Patient name: Jyoti Robertson  : 1990  MRN: [id number]  Referring provider: Sander Al DO  Dx:   Encounter Diagnosis     ICD-10-CM    1. Pain of right hip  M25.551           Start Time: 1045  Stop Time: 1130  Total time in clinic (min): 45 minutes    Assessment  Assessment details: Patient is a 34 y.o. female who presents to outpatient PT with reports of Right hip pain that started about 7 months ago. Patient was absent for PT for past 1 month as per physician's orders due to MRI performed and follow-up with orthopedic. Per Dr. Gina Ya, patient was not recommended for surgery at this time, however was recommended for injection per patient's preference. Patient arrives for re-evaluation today with continued anterior Right hip pain that continues to affect activities such as descending stairs, squatting, bending forward, prolonged standing, and prolonged sitting tasks. This affects her participation and tolerance with ADLs, household chores, and other daily functional activities. Scour, LEONARDA, and FADIR testing continue to provoke pain in anterior hip (deep). She continues to have weakness with glute and hip flexor musculature. This affect her motion with getting out of bed and out of car. She continues to have some muscle fatigue with prolonged activity, with muscle soreness reported that reduces with rest and cold pack. She lives with her parents and sister in a 2-story home, and thus has to navigate stairs frequently in the day. Patient has overall reported improved tolerance and positive response to PT interventions and exercises thus far. She was recommended by orthopedic specialist to continue further PT as it has been assisting with her functional mobility thus far.  Patient will benefit from continued skilled PT services to improve her pain levels, improve mobility, improve strength, and improve overall ease with ADLs, caring for her family, and household chores. Patient would benefit from skilled PT services to address these impairments, to maximize function, and to return her to max potential of PLOF. Thank you for the referral.  Impairments: abnormal or restricted ROM, activity intolerance, impaired physical strength, pain with function, weight-bearing intolerance and poor posture   Functional limitations: descendings stairs, squatting, bending forward, standing, prolonged sittingBarriers to therapy: Chronicity of symptoms  Understanding of Dx/Px/POC: good   Prognosis: good    Goals  Impairment Goals 4-6 weeks   In order to maximize function patient will be able to. ..   - Decrease intensity/duration/frequency of pain to 4/10. Ongoing  - Demonstrate symmetrical hip AROM without pain. Slightly Improved  - Increase hip strength to 4/5 throughout to improve mechanics and stability with walking, standing, etc. Slightly Improved  - Demonstrate improved hip flexibility as demonstrated by increased ROM through therapeutic exercise. Improving, continues to have difficulty with HS flexibility / stretching    Functional Goals 6-8 weeks  In order to return to prior level of function patient will be able to. ..   - Participate in ADL's/IADL's/sport specific activities with no greater than 2/10 pain. Improved  - Increase Functional Status Measure (FOTO) to: anticipated at discharge. Improved  - Demonstrate independence and compliant with HEP. Met  - Demonstrate a squat and or sit to stand with good mechanics and eccentric control without pain/difficulty/compensation. Improved  - Demonstrate functional activities with good core and glute strength without compensation/pain/difficulty. Improved   - Ascend and descend stairs without increased pain/compensation/difficulty and a reciprocal gait pattern.  Improved, ascending improved     Plan  Patient would benefit from: skilled PT  Planned modality interventions: cryotherapy  Other planned modality interventions: moist heat  Planned therapy interventions: joint mobilization, manual therapy, neuromuscular re-education, patient education, strengthening, stretching, therapeutic activities, therapeutic exercise, home exercise program, functional ROM exercises, Cristina taping, postural training, balance/weight bearing training, body mechanics training, flexibility, massage, nerve gliding, kinesiology taping, IASTM, transfer training and gait training  Frequency: 1-2x/week. Duration in weeks: 4  Treatment plan discussed with: patient, PTA and referring physician        Subjective Evaluation    History of Present Illness  Mechanism of injury: Keshia Bryan is a 34y.o. year-old female who presents to outpatient PT with reports of Right hip pain that started 6 months ago. Patient reports history of fall about 2 years ago, however mild pain at the time that reduced quickly. She reports no known RIANA of recent pain. She reports she started to notice it while putting on shoes/socks and performing bending forward activities. Patient saw her PCP on 2023 and was recommended for PT at this time. Patient did not receive any imaging.    Quality of life: good    Patient Goals  Patient goals for therapy: decreased pain, increased motion, improved balance, increased strength, independence with ADLs/IADLs and return to sport/leisure activities    Pain  Current pain rating: 3  At best pain ratin  At worst pain ratin  Location: Right hip  Quality: dull ache and sharp  Relieving factors: rest and heat  Aggravating factors: sitting, standing, stair climbing, walking and lifting  Progression: no change    Social Support  Steps to enter house: yes  Stairs in house: yes   Lives in: multiple-level home  Lives with: parents    Employment status: not working  Exercise history: walking      Diagnostic Tests  No diagnostic tests performed  Treatments  Current treatment: physical therapy        Objective     Observations     Additional Observation Details  Standing posture: Right hip, patella, and medial malleolus slightly lower than Left  Gait Assessment: slight Right hip drop; increased Right trunk rotation    Lumbar Screen  Lumbar range of motion within normal limits with the following exceptions:Flexion: WNLs but slight pinching on Left side; repeated movements worsened pain  Extension: WNLs with reduced pain; repeated movements reduced pain  Side-bending and Rotation: WNLs; mild LBP with Right SBing    Active Range of Motion   Left Hip   Normal active range of motion    Right Hip   Flexion: 120 degrees with pain  Extension: 10 degrees   Abduction: 30 degrees   External rotation (90/90): 45 degrees with pain  Internal rotation (90/90): Right hip active internal rotation 90/90: more pain thatn ER. WFL and with pain    Strength/Myotome Testing     Right Hip   Planes of Motion   Flexion: 4-  Extension: 3+  Abduction: 3+  Adduction: 3  External rotation: 4-  Internal rotation: 4-    Tests     Right Hip   Positive LEONARDA, FADIR, Gaenslen's and scour. Negative Justino. Additional Tests Details  On Re-eval: LLD (supine) bilaterally equal    On IE:  LLD (supine): Right leg "longer" than Left  Supine to Long-Sit Test: Right LE long to short (ALS)      Subjective: Patient reports she has been feeling better, but still has a lot of tightness in her Right anterior hip. She reports her Left sided sciatica has been bothering her more.         Diagnosis: Right hip pain  Precautions: GERD, anxiety/depression  ( * asterisk indicates given per HEP)  Next Physician Appointment:   Johnathon Blackwell:     Manuals 8/3 8/7 8/10 8/14 8/17 8/21 8/24 9/21         [facility] DK TH  DK TH DK TH DK         IASTM DK  DK DK TH DK           LE stretching        DK                          Neuro Re-Ed                 PPT                 TA ball press                 Supine hip ADD ball sque                 Supine BKFO RTB  S/L  Clams  x20 R                Wobble Board W/ foam x2' ea w/foam  x2'ea w/foam  x2'ea  2' ea w/foam  x2'ea W/foam  x2' ea          SLS trampoline GMB toss 2x10ea    On foam  3x20"  On foam  3x20"          Cone Taps                 Glut squeeze                 Quad set        Supine 2x10         Tandem balance on foam trampoline GMB toss x10ea                BOSU Step ups   FWD w/ march alt x10ea                                                Ther Ex                 Supine Hip Flexor        Right 5"x5         Butterfly stretch                 LTRs no ball                 Seated HS stretch                 L/S Ext stretch        Standing x5         Heel raises                 Standing Hip ABD                 Standing Hip Ext                                                                    Ther Activity                 Bike for LE mobility and endurance Upright  5' Upright  5' Upright  5' Upright  5' Upright   5' Upright   5' Upright 5'          Side stepping                 Step Ups  Step up  6" 10xea               Leg Press 70#  10x2, SL 30#  10x2  75#  10x2  SL 35#  10x2 75#  10x2  SL 35#  10x2  75#  10x2  SL 35#  10x2  75#  10x  SL 35#  10x2          TRX Squats 2x10 for ecc control  2x10 for ecc control  2x10 for ecc control 2x10 for ecc control 2x10 for ecc control          TRX Lateral Lunges   Alt x10ea   Alt x10ea Alt  x10 ea          [place] Walkouts      F/B 15# x5 laps           Lateral step overs     6" 2x10            X walks RTB @ blue line x2 laps RTB @ blue line2x GTB @ blue line x2   GTB @ blue line x2 GTB @ blue line x2                                            Pt Ed    POC, pain science  POC, pain science HEP POC         Re-Evaluation    DK    DK         Modalities                 Heat/ice (PRN) Ice 10' Ice 10' Ice 10' Ice 10' Ice 10' Ice 10' Ice 10' Ice 10'         TENS

## 2023-09-21 NOTE — TELEPHONE ENCOUNTER
Caller: patient    Doctor: Chelsea Haskins    Reason for call: patient called to cancel [facility] appt with Dr Chelsea Haskins on 9/26 at this time    Call back#: n/a

## 2023-09-25 ENCOUNTER — OFFICE VISIT (OUTPATIENT)
Dept: PHYSICAL THERAPY | Facility: CLINIC | Age: 33
End: 2023-09-25
Payer: COMMERCIAL

## 2023-09-25 DIAGNOSIS — M25.551 PAIN OF RIGHT HIP: Primary | ICD-10-CM

## 2023-09-25 PROCEDURE — 97110 THERAPEUTIC EXERCISES: CPT

## 2023-09-25 PROCEDURE — 97140 MANUAL THERAPY 1/> REGIONS: CPT

## 2023-09-25 PROCEDURE — 97112 NEUROMUSCULAR REEDUCATION: CPT

## 2023-09-25 NOTE — PROGRESS NOTES
Daily Note     Today's date: 2023  Patient name: Shreya Mendoza  : 1990  MRN: [id number]  Referring provider: Rebecca Corey DO  Dx:   Encounter Diagnosis     ICD-10-CM    1. Pain of right hip  M25.551           Start Time: 1047  Stop Time: 1132  Total time in clinic (min): 45 minutes    Subjective: Patient reports taking a lot of walks over the weekend and feeling slightly sore today, but nothing out of the ordinary. Patient notes not being able to complete her HEP stretches over the weekend since she walked a lot and had a busy schedule. Objective: See treatment diary below      Assessment: Tolerated treatment well. Patient did not experience any worsening of symptoms throughout today's session. Progressed DL leg press exercise by increasing weight which was tolerable for the patient today. Patient was able to self correct during wobble board and SLS on foam, in order to maintain center of balance to prevent any bouts of LOB. Tightness noted with R hip flexor/quad muscles which improved after STM of these areas. Patient continued to respond favorably to both manual therapy and icing R hip and experienced the most relief after these interventions. Muscle fatigue present at the conclusion of session. Continue to progress patient as able. Patient exhibited good technique with therapeutic exercises and would benefit from continued PT      Plan: Continue per plan of care. Progress treatment as tolerated.        Diagnosis: Right hip pain  Precautions: GERD, anxiety/depression  ( * asterisk indicates given per HEP)  Next Physician Appointment:    Johnathon Blackwell:     Manuals 8/3 8/7 8/10 8/14 8/17 8/21 8/24 9/21 9/25        STM DK TH  DK TH DK TH JIA         IAS DK  DK DK TH DK           LE stretching        DK                          Neuro Re-Ed                 PPT                 TA ball press                 Supine hip ADD ball sque                 Supine BKFO RTB  S/L  Clams  x20 R Wobble Board W/ foam x2' ea w/foam  x2'ea w/foam  x2'ea  2' ea w/foam  x2'ea W/foam  x2' ea  W/foam  x2' ea        SLS trampoline GMB toss 2x10ea    On foam  3x20"  On foam  3x20"  On foam 3x20"        Cone Taps                 Glut squeeze                 Quad set        Supine 2x10 Supine 2x10        Tandem balance on foam trampoline GMB toss x10ea                BOSU Step ups   FWD w/ march alt x10ea                                                Ther Ex                 Supine Hip Flexor        Right 5"x5         Butterfly stretch                 LTRs no ball                 Seated HS stretch                 L/S Ext stretch        Standing x5 Standing x5        Heel raises                 Standing Hip ABD                 Standing Hip Ext                                                                    Ther Activity                 Bike for LE mobility and endurance Upright  5' Upright  5' Upright  5' Upright  5' Upright   5' Upright   5' Upright 5'  Upright 5'        Side stepping                 Step Ups  Step up  6" 10xea               Leg Press 70#  10x2, SL 30#  10x2  75#  10x2  SL 35#  10x2 75#  10x2  SL 35#  10x2  75#  10x2  SL 35#  10x2  75#  10x  SL 35#  10x2  85# 2x10  SL 35# 2x10        TRX Squats 2x10 for ecc control  2x10 for ecc control  2x10 for ecc control 2x10 for ecc control 2x10 for ecc control  2x10 for ecc control        TRX Lateral Lunges   Alt x10ea   Alt x10ea Alt  x10 ea  Alt x10 ea        [place] Walkouts      F/B 15# x5 laps           Lateral step overs     6" 2x10            X walks RTB @ blue line x2 laps RTB @ blue line2x GTB @ blue line x2   GTB @ blue line x2 GTB @ blue line x2  GTB @ blue line x2                                          Pt Ed    POC, pain science  POC, pain science HEP POC         Re-Evaluation    DK    DK         Modalities                 Heat/ice (PRN) Ice 10' Ice 10' Ice 10' Ice 10' Ice 10' Ice 10' Ice 10' Ice 10' Ice 5'        TENS [id number]

## 2023-09-28 ENCOUNTER — APPOINTMENT (OUTPATIENT)
Dept: PHYSICAL THERAPY | Facility: CLINIC | Age: 33
End: 2023-09-28
Payer: COMMERCIAL

## 2023-09-29 ENCOUNTER — OFFICE VISIT (OUTPATIENT)
Dept: PHYSICAL THERAPY | Facility: CLINIC | Age: 33
End: 2023-09-29
Payer: COMMERCIAL

## 2023-09-29 DIAGNOSIS — M25.551 PAIN OF RIGHT HIP: Primary | ICD-10-CM

## 2023-09-29 PROCEDURE — 97140 MANUAL THERAPY 1/> REGIONS: CPT

## 2023-09-29 PROCEDURE — 97110 THERAPEUTIC EXERCISES: CPT

## 2023-09-29 PROCEDURE — 97530 THERAPEUTIC ACTIVITIES: CPT

## 2023-09-29 NOTE — PROGRESS NOTES
Daily Note     Today's date: 2023  Patient name: Liliya Walker  : 1990  MRN: [id number]  Referring provider: Matt Tuttle DO  Dx:   Encounter Diagnosis     ICD-10-CM    1. Pain of right hip  M25.551           Start Time: 1015  Stop Time: 1100  Total time in clinic (min): 45 minutes    Subjective: Patient reports she is feeling a little sore today because she had to drive to [employer] yesterday. Objective: See treatment diary below      Assessment: Tolerated treatment well. Initiated session with functional strengthening. [employer] Corine walkouts to work on posture and motor control. Patient challenged with blue TB for X walks, however performed without increased pain. Performed standing adductor stretches with good form and tolerance. Good response to manual treatment and cold pack end of session. Patient exhibited good technique with therapeutic exercises and would benefit from continued PT      Plan: Continue per plan of care. Progress treatment as tolerated.        Diagnosis: Right hip pain  Precautions: GERD, anxiety/depression  ( * asterisk indicates given per HEP)  Next Physician Appointment:    Roland Zarate:     Manuals 8/3 8/7 8/10 8/14 8/17 8/21 8/24 9/21 9/25 9/29       STM DK TH  DK TH DK TH DK KA DK       IASTM DK  DK DK TH DK    DK       LE stretching        DK                          Neuro Re-Ed                 PPT                 TA ball press                 Supine hip ADD ball sque                 Supine BKFO RTB  S/L  Clams  x20 R                Wobble Board W/ foam x2' ea w/foam  x2'ea w/foam  x2'ea  2' ea w/foam  x2'ea W/foam  x2' ea  W/foam  x2' ea W/foam  x2' ea       SLS trampoline GMB toss 2x10ea    On foam  3x20"  On foam  3x20"  On foam 3x20"        Cone Taps                 Glut squeeze                 Quad set        Supine 2x10 Supine 2x10        Tandem balance on foam trampoline GMB toss x10ea                BOSU Step ups   FWD / march alt x10ea Ther Ex                 Supine Hip Flexor        Right 5"x5         Butterfly stretch                 LTRs no ball                 Standing ADD stretch          10"x10ea       Seated HS stretch                 L/S Ext stretch        Standing x5 Standing x5 Standing x10       Heel raises                 Standing Hip ABD                 Standing Hip Ext                                                                    Ther Activity                 Bike for LE mobility and endurance Upright  5' Upright  5' Upright  5' Upright  5' Upright   5' Upright   5' Upright 5'  Upright 5' Upright 5'       Side stepping                 Step Ups  Step up  6" 10xea               Leg Press 70#  10x2, SL 30#  10x2  75#  10x2  SL 35#  10x2 75#  10x2  SL 35#  10x2  75#  10x2  SL 35#  10x2  75#  10x  SL 35#  10x2  85# 2x10  SL 35# 2x10 85# 2x10  SL 35# 2x10       TRX Squats 2x10 for ecc control  2x10 for ecc control  2x10 for ecc control 2x10 for ecc control 2x10 for ecc control  2x10 for ecc control        TRX Lateral Lunges   Alt x10ea   Alt x10ea Alt  x10 ea  Alt x10 ea        [place] Walkouts      F/B 15# x5 laps    B/F 15# x6 laps       Lateral step overs     6" 2x10            X walks RTB @ blue line x2 laps RTB @ blue line2x GTB @ blue line x2   GTB @ blue line x2 GTB @ blue line x2  GTB @ blue line x2 BTB @ blue line x2                                         Pt Ed    POC, pain science  POC, pain science HEP POC         Re-Evaluation    DK    DK         Modalities                 Heat/ice (PRN) Ice 10' Ice 10' Ice 10' Ice 10' Ice 10' Ice 10' Ice 10' Ice 10' Ice 5' Ice 10'       TENS

## 2023-10-02 ENCOUNTER — OFFICE VISIT (OUTPATIENT)
Dept: PHYSICAL THERAPY | Facility: CLINIC | Age: 33
End: 2023-10-02
Payer: COMMERCIAL

## 2023-10-02 DIAGNOSIS — M25.551 PAIN OF RIGHT HIP: Primary | ICD-10-CM

## 2023-10-02 PROCEDURE — 97140 MANUAL THERAPY 1/> REGIONS: CPT

## 2023-10-02 PROCEDURE — 97530 THERAPEUTIC ACTIVITIES: CPT

## 2023-10-02 PROCEDURE — 97110 THERAPEUTIC EXERCISES: CPT

## 2023-10-02 NOTE — PROGRESS NOTES
Daily Note     Today's date: 10/2/2023  Patient name: Opal Guerra  : 1990  MRN: [id number]  Referring provider: Rhonda Patiño DO  Dx:   Encounter Diagnosis     ICD-10-CM    1. Pain of right hip  M25.551                      Subjective: Pt states that she is doing ok, she just irritated her hip some yesterday when trying on shoes. It feels stiff some this morning. Objective: See treatment diary below      Assessment: Tolerated treatment well. Continued with outlined program and progressed as tolerated. Pt was able to perform heel taps on 4" box with no increased sx's. Pt had the most difficulty with increased hip pain during SLS. Tenderness noted lateral hip > anterior hip during STM with decreased sx's after. Will continue to progress pt as tolerated as she will benefit from continued therapy.         Plan: Continue per plan of care     Diagnosis: Right hip pain  Precautions: GERD, anxiety/depression  ( * asterisk indicates given per HEP)  Next Physician Appointment:    Bryon Jewell:     Manuals 10/2 8/7 8/10 8/14 8/17 8/21 8/24 9/21 9/25 9/29   STM TH TH  DK TH DK TH DK KA DK   IASTM   DK DK TH DK    DK   LE stretching        DK                  Neuro Re-Ed             PPT             TA ball press             Supine hip ADD ball sque             Supine BKFO             Wobble Board  w/foam  x2'ea w/foam  x2'ea  2' ea w/foam  x2'ea W/foam  x2' ea  W/foam  x2' ea W/foam  x2' ea   SLS W/ball toss 10x green    On foam  3x20"  On foam  3x20"  On foam 3x20"    Cone Taps             Glut squeeze             Quad set        Supine 2x10 Supine 2x10    Tandem balance on foam             BOSU Step ups   FWD / march alt x10ea                                    Ther Ex             Supine Hip Flexor        Right 5"x5     Butterfly stretch             LTRs no ball             Standing ADD stretch 10x10" ea         10"x10ea   Seated HS stretch             L/S Ext stretch Standing  x10       Standing x5 Standing x5 Standing x10                Standing Hip ABD             Standing Hip Ext                                                    Ther Activity             Bike for LE mobility and endurance Upright  5' Upright  5' Upright  5' Upright  5' Upright   5' Upright   5' Upright 5'  Upright 5' Upright 5'   Heel taps 4" 10x2            Step Ups  Step up  6" 10xea           Leg Press 85# 2x10  SL 35#  2x10 75#  10x2  SL 35#  10x2 75#  10x2  SL 35#  10x2  75#  10x2  SL 35#  10x2  75#  10x  SL 35#  10x2  85# 2x10  SL 35# 2x10 85# 2x10  SL 35# 2x10   TRX Squats 2x10  2x10 for ecc control  2x10 for ecc control 2x10 for ecc control 2x10 for ecc control  2x10 for ecc control    TRX Lateral Lunges   Alt x10ea   Alt x10ea Alt  x10 ea  Alt x10 ea    [place]ine Walkouts      F/B 15# x5 laps    B/F 15# x6 laps   Lateral step overs     6" 2x10        X walks BTB @ blue line  x2 RTB @ blue line2x GTB @ blue line x2   GTB @ blue line x2 GTB @ blue line x2  GTB @ blue line x2 BTB @ blue line x2                             Pt Ed HEP   POC, pain science  POC, pain science HEP POC     Re-Evaluation    DK    DK     Modalities             Heat/ice (PRN) Ice 10' Ice 10' Ice 10' Ice 10' Ice 10' Ice 10' Ice 10' Ice 10' Ice 5' Ice 10'   TENS

## 2023-10-05 ENCOUNTER — OFFICE VISIT (OUTPATIENT)
Dept: PHYSICAL THERAPY | Facility: CLINIC | Age: 33
End: 2023-10-05
Payer: COMMERCIAL

## 2023-10-05 DIAGNOSIS — M25.551 PAIN OF RIGHT HIP: Primary | ICD-10-CM

## 2023-10-05 PROCEDURE — 97140 MANUAL THERAPY 1/> REGIONS: CPT

## 2023-10-05 PROCEDURE — 97530 THERAPEUTIC ACTIVITIES: CPT

## 2023-10-05 PROCEDURE — 97110 THERAPEUTIC EXERCISES: CPT

## 2023-10-05 NOTE — PROGRESS NOTES
Daily Note     Today's date: 10/5/2023  Patient name: Claude Botello  : 1990  MRN: [id number]  Referring provider: Paradise Huerta DO  Dx:   Encounter Diagnosis     ICD-10-CM    1. Pain of right hip  M25.551                      Subjective: Pt states that she was sore after her last visit but just muscle sore, not hip pain. Yesterday she reports that she did have hip pain and she isn't sure why. Pt reports that the pain radiated down her leg some. Pt continues to be anxious about getting an injection. Objective: See treatment diary below      Assessment: Tolerated treatment well. Pt continues to be challenged overall due to increased hip pain. Pt attempts all ex's and is able to perform most without increased pain, however it is difficult to progress due to increased pain in general between visits. Pt moves slowly through ex's as she is cautious to avoid pain. Discussed following up with surgeon in regards to plan as pt is anxious regarding an injection. Will progress as able.         Plan: Continue per plan of care     Diagnosis: Right hip pain  Precautions: GERD, anxiety/depression  ( * asterisk indicates given per HEP)  Next Physician Appointment:    Katy Sanon:     Manuals 10/2 10/5 8/10 8/14 8/17 8/21 8/24 9/21 9/25 9/29   STM TH TH  DK TH DK TH DK KA DK   IASTM   DK DK TH DK    DK   LE stretching        DK                  Neuro Re-Ed             PPT             TA ball press             Supine hip ADD ball sque             Supine BKFO             Wobble Board   w/foam  x2'ea  2' ea w/foam  x2'ea W/foam  x2' ea  W/foam  x2' ea W/foam  x2' ea   SLS W/ball toss 10x green    On foam  3x20"  On foam  3x20"  On foam 3x20"    Cone Taps             Glut squeeze             Quad set        Supine 2x10 Supine 2x10    Tandem balance on foam             BOSU Step ups   FWD / march alt x10ea                                    Ther Ex             Supine Hip Flexor        Right 5"x5     Butterfly stretch             LTRs no ball             Standing ADD stretch 10x10" ea 10x10" ea        10"x10ea   Seated HS stretch             L/S Ext stretch Standing  x10       Standing x5 Standing x5 Standing x10                Standing Hip ABD             Standing Hip Ext                                                    Ther Activity             Bike for LE mobility and endurance Upright  5' Upright  5' Upright  5' Upright  5' Upright   5' Upright   5' Upright 5'  Upright 5' Upright 5'   Heel taps 4" 10x2 4" 10x2           Step Ups             Leg Press 85# 2x10  SL 35#  2x10 85#  2x10  SL 35#  2x10 75#  10x2  SL 35#  10x2  75#  10x2  SL 35#  10x2  75#  10x  SL 35#  10x2  85# 2x10  SL 35# 2x10 85# 2x10  SL 35# 2x10   TRX Squats 2x10  2x10 for ecc control  2x10 for ecc control 2x10 for ecc control 2x10 for ecc control  2x10 for ecc control    TRX Lateral Lunges   Alt x10ea   Alt x10ea Alt  x10 ea  Alt x10 ea    [place] Walkouts  F/B  10# x8 laps    F/B 15# x5 laps    B/F 15# x6 laps   Lateral step overs     6" 2x10        X walks BTB @ blue line  x2  GTB @ blue line x2   GTB @ blue line x2 GTB @ blue line x2  GTB @ blue line x2 BTB @ blue line x2                             Pt Ed HEP   POC, pain science  POC, pain science HEP POC     Re-Evaluation    DK    DK     Modalities             Heat/ice (PRN) Ice 10' Ice 10' Ice 10' Ice 10' Ice 10' Ice 10' Ice 10' Ice 10' Ice 5' Ice 10'   TENS

## 2023-10-09 ENCOUNTER — OFFICE VISIT (OUTPATIENT)
Dept: PHYSICAL THERAPY | Facility: CLINIC | Age: 33
End: 2023-10-09
Payer: COMMERCIAL

## 2023-10-09 DIAGNOSIS — M25.551 PAIN OF RIGHT HIP: Primary | ICD-10-CM

## 2023-10-09 PROCEDURE — 97110 THERAPEUTIC EXERCISES: CPT | Performed by: PHYSICAL THERAPIST

## 2023-10-09 PROCEDURE — 97140 MANUAL THERAPY 1/> REGIONS: CPT | Performed by: PHYSICAL THERAPIST

## 2023-10-09 PROCEDURE — 97530 THERAPEUTIC ACTIVITIES: CPT | Performed by: PHYSICAL THERAPIST

## 2023-10-09 NOTE — PROGRESS NOTES
Daily Note     Today's date: 10/9/2023  Patient name: Donal Baig  : 1990  MRN: [id number]  Referring provider: Zaid Esposito DO  Dx:   Encounter Diagnosis     ICD-10-CM    1. Pain of right hip  M25.551           Start Time: 1130  Stop Time: 1215  Total time in clinic (min): 45 minutes    Subjective: Patient reports she still has pain when she does a lot of activities (driving etc). She is still contemplating on getting injection. Objective: See treatment diary below      Assessment: Tolerated treatment well. Initiated session with functional strengthening. Patient exhibits good form with leg press and TRX squats. Improved motor control with luke walkouts. Standing adductor and lumbar extension stretches. Patient exhibited good technique with therapeutic exercises and would benefit from continued PT      Plan: Continue per plan of care. Progress treatment as tolerated.        Diagnosis: Right hip pain  Precautions: GERD, anxiety/depression  ( * asterisk indicates given per HEP)  Next Physician Appointment:    Rosa Isela Dennis:     Manuals 10/2 10/5 10/9          [facility] TH  JIA          IASANAHY             LE stretching                          Neuro Re-Ed             PPT             TA ball press             Supine hip ADD ball sque             Supine BKFO             Wobble Board   x2' ea          SLS W/ball toss 10x green  W/ball toss 10x green          Cone Taps             Glut squeeze             Quad set             Tandem balance on foam             BOSU Step ups                                       Ther Ex             Supine Hip Flexor             Butterfly stretch             LTRs no ball             Standing ADD stretch 10x10" ea 10x10" ea 10x10" ea          Seated HS stretch             L/S Ext stretch Standing  x10  Standing  x10                       Standing Hip ABD             Standing Hip Ext                                                    Ther Activity             Bike for LE mobility and endurance Upright  5' Upright  5' Upright  5'          Heel taps 4" 10x2 4" 10x2           Step Ups             Leg Press 85# 2x10  SL 35#  2x10 85#  2x10  SL 35#  2x10 85#  2x10  SL 35#  2x10          TRX Squats 2x10  2x10          TRX Lateral Lunges             Corine Walkouts  F/B  10# x8 laps F/B  10# x8 laps          Lateral step overs             X walks BTB @ blue line  x2                                      Pt Ed HEP            Re-Evaluation             Modalities             Heat/ice (PRN) Ice 10' Ice 10' Ice 10'          TENS

## 2023-10-12 ENCOUNTER — OFFICE VISIT (OUTPATIENT)
Dept: PHYSICAL THERAPY | Facility: CLINIC | Age: 33
End: 2023-10-12
Payer: COMMERCIAL

## 2023-10-12 DIAGNOSIS — M25.551 PAIN OF RIGHT HIP: Primary | ICD-10-CM

## 2023-10-12 PROCEDURE — 97140 MANUAL THERAPY 1/> REGIONS: CPT

## 2023-10-12 PROCEDURE — 97530 THERAPEUTIC ACTIVITIES: CPT

## 2023-10-12 PROCEDURE — 97110 THERAPEUTIC EXERCISES: CPT

## 2023-10-12 NOTE — PROGRESS NOTES
Daily Note     Today's date: 10/12/2023  Patient name: Liliya Walker  : 1990  MRN: [id number]  Referring provider: Matt Tuttle DO  Dx:   Encounter Diagnosis     ICD-10-CM    1. Pain of right hip  M25.551                      Subjective: Pt states that she is probably feeling better than the other day. Her hip isn't quite as painful. Objective: See treatment diary below      Assessment: Tolerated treatment well. Continued to slowly integrate progressions in strengthening as tolerated. No pain noted with heel taps, just muscle fatigue. IASTM to anterior and lateral hip with tenderness noted. Pt progressing slowly and will benefit from continued therapy. Plan: Continue per plan of care.       Diagnosis: Right hip pain  Precautions: GERD, anxiety/depression  ( * asterisk indicates given per HEP)  Next Physician Appointment:    Roland Zarate:     Manuals 10/2 10/5 10/9 10/12         STM TH TH DK          IASTM    TH         LE stretching                          Neuro Re-Ed             PPT             TA ball press             Supine hip ADD ball sque             Supine BKFO             Wobble Board   x2' ea 2' ea         SLS W/ball toss 10x green  W/ball toss 10x green          Cone Taps             Glut squeeze             Quad set             Tandem balance on foam             BOSU Step ups                                       Ther Ex             Supine Hip Flexor             Butterfly stretch             LTRs no ball             Standing ADD stretch 10x10" ea 10x10" ea 10x10" ea 10x10"ea         Seated HS stretch             L/S Ext stretch Standing  x10  Standing  x10                       Standing Hip ABD             Standing Hip Ext                                                    Ther Activity             Bike for LE mobility and endurance Upright  5' Upright  5' Upright  5' Upright  5'         Heel taps 4" 10x2 4" 10x2  4' 10x2         Step Ups    6" 10x2         Leg Press 85# 2x10  SL 35#  2x10 85#  2x10  SL 35#  2x10 85#  2x10  SL 35#  2x10 85#  2x10  SL 35#  2x10         Mini lunge    10x ea         TRX Squats 2x10  2x10 2x10         TRX Lateral Lunges             Corine Walkouts  F/B  10# x8 laps F/B  10# x8 laps          Lateral step overs             X walks BTB @ blue line  x2   BTB@ blue line x2                                   Pt Ed HEP            Re-Evaluation             Modalities             Heat/ice (PRN) Ice 10' Ice 10' Ice 10'          TENS

## 2023-10-16 ENCOUNTER — OFFICE VISIT (OUTPATIENT)
Dept: PHYSICAL THERAPY | Facility: CLINIC | Age: 33
End: 2023-10-16
Payer: COMMERCIAL

## 2023-10-16 DIAGNOSIS — M25.551 PAIN OF RIGHT HIP: Primary | ICD-10-CM

## 2023-10-16 PROCEDURE — 97530 THERAPEUTIC ACTIVITIES: CPT | Performed by: PHYSICAL THERAPIST

## 2023-10-16 PROCEDURE — 97140 MANUAL THERAPY 1/> REGIONS: CPT | Performed by: PHYSICAL THERAPIST

## 2023-10-16 PROCEDURE — 97112 NEUROMUSCULAR REEDUCATION: CPT | Performed by: PHYSICAL THERAPIST

## 2023-10-16 NOTE — PROGRESS NOTES
Daily Note     Today's date: 10/16/2023  Patient name: Breezy Johnson  : 1990  MRN: [id number]  Referring provider: Marija Deras DO  Dx:   Encounter Diagnosis     ICD-10-CM    1. Pain of right hip  M25.551           Start Time: 1045  Stop Time: 1130  Total time in clinic (min): 45 minutes    Subjective: Patient reports she was very sore over the weekend. She reports she is still debating getting the injection as recommended by orthopedic specialist.       Objective: See treatment diary below      Assessment: Tolerated treatment well. Good tolerance to progression of weight with leg press. Added SL RDL to progress HS/ glute strength. Performed with some soreness reported, but no increase in pain. Good tolerance and reduced soreness after adductor stretching. Responded well to manual techniques and cold pack end of session. Patient exhibited good technique with therapeutic exercises and would benefit from continued PT      Plan: Continue per plan of care. Progress treatment as tolerated.        Diagnosis: Right hip pain  Precautions: GERD, anxiety/depression  ( * asterisk indicates given per HEP)  Next Physician Appointment:    Delta Lorenzo:     Manuals 10/2 10/5 10/9 10/12 10/16        [facility] TH TH DK  DK        IAS    TH DK        LE stretching                          Neuro Re-Ed             PPT             TA ball press             Supine hip ADD ball sque             Supine BKFO             Wobble Board   x2' ea 2' ea         SLS W/ball toss 10x green  W/ball toss 10x green          SL RDL cone tap     4 cones on chair x3ea        Glut squeeze             Quad set             Tandem balance on foam             BOSU Step ups                                       Ther Ex             Supine Hip Flexor             Butterfly stretch             LTRs no ball             Standing ADD stretch 10x10" ea 10x10" ea 10x10" ea 10x10"ea 10x10"ea        Seated HS stretch             L/S Ext stretch Standing  x10 Standing  x10  Standing  x10                     Standing Hip ABD             Standing Hip Ext                                                    Ther Activity             Bike for LE mobility and endurance Upright  5' Upright  5' Upright  5' Upright  5' Upright  5'        Heel taps 4" 10x2 4" 10x2  4' 10x2         Step Ups    6" 10x2         Leg Press 85# 2x10  SL 35#  2x10 85#  2x10  SL 35#  2x10 85#  2x10  SL 35#  2x10 85#  2x10  SL 35#  2x10 90#  2x10  SL 40#  2x10        Mini lunge    10x ea         TRX Squats 2x10  2x10 2x10         TRX Lateral Lunges             Corine Walkouts  F/B  10# x8 laps F/B  10# x8 laps  F/B  10# x8 laps        Lateral step overs             X walks BTB @ blue line  x2   BTB@ blue line x2 BTB@ blue line x2                                  Pt Ed HEP            Re-Evaluation             Modalities             Heat/ice (PRN) Ice 10' Ice 10' Ice 10'  Ice 10'        TENS

## 2023-10-19 ENCOUNTER — OFFICE VISIT (OUTPATIENT)
Dept: PHYSICAL THERAPY | Facility: CLINIC | Age: 33
End: 2023-10-19
Payer: COMMERCIAL

## 2023-10-19 DIAGNOSIS — M25.551 PAIN OF RIGHT HIP: Primary | ICD-10-CM

## 2023-10-19 PROCEDURE — 97140 MANUAL THERAPY 1/> REGIONS: CPT

## 2023-10-19 PROCEDURE — 97530 THERAPEUTIC ACTIVITIES: CPT

## 2023-10-19 PROCEDURE — 97110 THERAPEUTIC EXERCISES: CPT

## 2023-10-19 NOTE — PROGRESS NOTES
Daily Note     Today's date: 10/19/2023  Patient name: Marilyn Reed  : 1990  MRN: [id number]  Referring provider: Rachna Gongora DO  Dx:   Encounter Diagnosis     ICD-10-CM    1. Pain of right hip  M25.551                      Subjective: Pt states that she is doing about the same, no worse no better. She has pain that goes down      Objective: See treatment diary below      Assessment: Tolerated treatment well. Progressed pt with strengthening as indicated in table. Increased reps with leg press with no issues. Mini lunges with no increased pain. STS caused increased knee pain on the L, however she was able to perform with no hip pain. Less tenderness noted with manuals. Discussed HEP and plan with pt reporting understanding and compliance. Will progress as able. Plan: Continue per plan of care.       Diagnosis: Right hip pain  Precautions: GERD, anxiety/depression  ( * asterisk indicates given per HEP)  Next Physician Appointment:    Vincenzo Woods:     Manuals 10/2 10/5 10/9 10/12 10/16 10/19       STM TH TH DK  DK TH       IAS    TH DK TH       LE stretching                          Neuro Re-Ed             PPT             TA ball press             Supine hip ADD ball sque             Supine BKFO             Wobble Board   x2' ea 2' ea         SLS W/ball toss 10x green  W/ball toss 10x green          SL RDL cone tap     4 cones on chair x3ea        Glut squeeze             Quad set             Tandem balance on foam             BOSU Step ups                                       Ther Ex             Supine Hip Flexor             Butterfly stretch             LTRs no ball             Standing ADD stretch 10x10" ea 10x10" ea 10x10" ea 10x10"ea 10x10"ea        Seated HS stretch             L/S Ext stretch Standing  x10  Standing  x10  Standing  x10 Stand  x10                    Standing Hip ABD             Standing Hip Ext                                                    Ther Activity Bike for LE mobility and endurance Upright  5' Upright  5' Upright  5' Upright  5' Upright  5' Upright  5'       Heel taps 4" 10x2 4" 10x2  4' 10x2  4" 10x2       Step Ups    6" 10x2         Leg Press 85# 2x10  SL 35#  2x10 85#  2x10  SL 35#  2x10 85#  2x10  SL 35#  2x10 85#  2x10  SL 35#  2x10 90#  2x10  SL 40#  2x10 90#  3x10  SL40#  3x10       Mini lunge    10x ea  10x ea on busu       TRX Squats 2x10  2x10 2x10         TRX Lateral Lunges             Corine Walkouts  F/B  10# x8 laps F/B  10# x8 laps  F/B  10# x8 laps        Lateral step overs             X walks BTB @ blue line  x2   BTB@ blue line x2 BTB@ blue line x2        STS with KB      5# 10x2                    Pt Ed HEP            Re-Evaluation             Modalities             Heat/ice (PRN) Ice 10' Ice 10' Ice 10'  Ice 10'        TENS

## 2023-10-23 ENCOUNTER — EVALUATION (OUTPATIENT)
Dept: PHYSICAL THERAPY | Facility: CLINIC | Age: 33
End: 2023-10-23
Payer: COMMERCIAL

## 2023-10-23 DIAGNOSIS — M25.551 PAIN OF RIGHT HIP: Primary | ICD-10-CM

## 2023-10-23 PROCEDURE — 97530 THERAPEUTIC ACTIVITIES: CPT | Performed by: PHYSICAL THERAPIST

## 2023-10-23 PROCEDURE — 97140 MANUAL THERAPY 1/> REGIONS: CPT | Performed by: PHYSICAL THERAPIST

## 2023-10-23 NOTE — PROGRESS NOTES
PT Re-Evaluation     Today's date: 10/23/2023  Patient name: Ricky Anderson  : 1990  MRN: [id number]  Referring provider: Rosalba Anaya DO  Dx:   Encounter Diagnosis     ICD-10-CM    1. Pain of right hip  M25.551             Start Time: 1130  Stop Time: 1215  Total time in clinic (min): 45 minutes    Assessment  Assessment details: Patient is a 34 y.o. female who presents to outpatient PT with reports of Right hip pain that started about 8 months ago. Patient has been making steady progress in overall function since start of PT. Improved pain levels to 3-4/10 at worst (compared to 5/10 at last re-eval). Patient reports reduced difficulty with sitting, standing, and descending stairs. She continues to have tightness with standing flexion / bending over tasks. She continues to have pain with scour, LEONARDA, and FADIR testings. Joint mobs posterio-inferior and lateral directions cause some pain, however short-axis distraction provides some relief. Improving glute and hip adductor muscle strength noted. She continues to have pain with hip flexor strength testing. Overall improved mechanics and stability with functional tasks. However, current pain levels affect her participation and tolerance with ADLs, household chores, and other daily functional activities. She continues to have some muscle fatigue with prolonged activity, with muscle soreness reported that reduces with rest and cold pack. She lives with her parents and sister in a 2-story home, and thus has to navigate stairs frequently in the day. Patient has overall reported improved tolerance and positive response to PT interventions and exercises thus far. She was recommended by orthopedic specialist to continue further PT as it has been assisting with her functional mobility thus far.  Patient will benefit from continued skilled PT services to improve her pain levels, improve mobility, improve strength, and improve overall ease with ADLs, caring for her family, and household chores. Patient would benefit from skilled PT services to address these impairments, to maximize function, and to return her to max potential of PLOF. Thank you for the referral.  Impairments: abnormal or restricted ROM, activity intolerance, impaired physical strength, pain with function, weight-bearing intolerance and poor posture   Functional limitations: descendings stairs, squatting, bending forward, standing, prolonged sittingBarriers to therapy: Chronicity of symptoms  Understanding of Dx/Px/POC: good   Prognosis: good    Goals  Impairment Goals 4-6 weeks   In order to maximize function patient will be able to. ..   - Decrease intensity/duration/frequency of pain to 4/10. Improved  - Demonstrate symmetrical hip AROM without pain. Improved  - Increase hip strength to 4/5 throughout to improve mechanics and stability with walking, standing, etc. Improved  - Demonstrate improved hip flexibility as demonstrated by increased ROM through therapeutic exercise. Improving, continues to have difficulty with HS flexibility / stretching    Functional Goals 6-8 weeks  In order to return to prior level of function patient will be able to. ..   - Participate in ADL's/IADL's/sport specific activities with no greater than 2/10 pain. Improved  - Increase Functional Status Measure (FOTO) to: anticipated at discharge. Met  - Demonstrate independence and compliant with HEP. Met  - Demonstrate a squat and or sit to stand with good mechanics and eccentric control without pain/difficulty/compensation. Improved, pain with squatting  - Demonstrate functional activities with good core and glute strength without compensation/pain/difficulty. Improved   - Ascend and descend stairs without increased pain/compensation/difficulty and a reciprocal gait pattern.  Partially Met, ascending improved     Plan  Patient would benefit from: skilled PT  Planned modality interventions: cryotherapy  Other planned modality interventions: moist heat  Planned therapy interventions: joint mobilization, manual therapy, neuromuscular re-education, patient education, strengthening, stretching, therapeutic activities, therapeutic exercise, home exercise program, functional ROM exercises, Cristina taping, postural training, balance/weight bearing training, body mechanics training, flexibility, massage, nerve gliding, kinesiology taping, IASTM, transfer training and gait training  Frequency: 1-2x/week. Duration in weeks: 4  Treatment plan discussed with: patient, PTA and referring physician        Subjective Evaluation    History of Present Illness  Mechanism of injury: Esau Lou is a 34y.o. year-old female who presents to outpatient PT with reports of Right hip pain that started 6 months ago. Patient reports history of fall about 2 years ago, however mild pain at the time that reduced quickly. She reports no known RIANA of recent pain. She reports she started to notice it while putting on shoes/socks and performing bending forward activities. Patient saw her PCP on 2023 and was recommended for PT at this time. Patient did not receive any imaging.    Quality of life: good    Patient Goals  Patient goals for therapy: decreased pain, increased motion, improved balance, increased strength, independence with ADLs/IADLs and return to sport/leisure activities    Pain  Current pain rating: 3  At best pain ratin  At worst pain ratin  Location: Right hip  Quality: dull ache  Relieving factors: rest and heat  Aggravating factors: sitting, standing, stair climbing, walking and lifting  Progression: improved    Social Support  Steps to enter house: yes  Stairs in house: yes   Lives in: multiple-level home  Lives with: parents    Employment status: not working  Exercise history: walking      Diagnostic Tests  No diagnostic tests performed  Treatments  Current treatment: physical therapy        Objective     Observations     Additional Observation Details  Standing posture: Right hip, patella, and medial malleolus slightly lower than Left  Gait Assessment: slight Right hip drop; increased Right trunk rotation    Lumbar Screen  Lumbar range of motion within normal limits with the following exceptions:Flexion: WNLs with tightness; repeated movements slightly increased soreness  Extension: WNLs with reduced pain; repeated movements reduced pain  Side-bending and Rotation: WNLs; mild Right hip pain with Right SBing    Active Range of Motion   Left Hip   Normal active range of motion    Right Hip   Flexion: 120 degrees with pain  Extension: 10 degrees   Abduction: 30 degrees   External rotation (90/90): 45 (mild) degrees with pain  Internal rotation (90/90): Right hip active internal rotation 90/90: mild. WFL and with pain    Strength/Myotome Testing     Right Hip   Planes of Motion   Flexion: 4- (painful with supine SLR)  Extension: 3+  Abduction: 3+  Adduction: 3+  External rotation: 4- (painful)  Internal rotation: 4- (painful)    Tests     Right Hip   Positive LEONARDA, FADIR, Gaenslen's and scour. Negative Justino. Additional Tests Details  On Re-eval: LLD (supine) bilaterally equal    On IE:  LLD (supine): Right leg "longer" than Left  Supine to Long-Sit Test: Right LE long to short (ALS)      Subjective: Patient reports she has been feeling better in function overall, but still has pain with certain movements.        Diagnosis: Right hip pain  Precautions: GERD, anxiety/depression  ( * asterisk indicates given per HEP)  Next Physician Appointment:    Vincenzo Woods:     Manuals 10/2 10/5 10/9 10/12 10/16 10/19 10/23        [facility]        [facility]         LE stretching               Joint Mobs       DK, short axis distract w/ ER/IR                       Neuro Re-Ed               PPT               TA ball press               Supine hip ADD ball sque               Supine BKFO               Wobble Board   x2' ea 2' ea SLS W/ball toss 10x green  W/ball toss 10x green            SL RDL cone tap     4 cones on chair x3ea          Glut squeeze               Quad set               Tandem balance on foam               BOSU Step ups                                             Ther Ex               Supine Hip Flexor               Butterfly stretch               LTRs no ball               Standing ADD stretch 10x10" ea 10x10" ea 10x10" ea 10x10"ea 10x10"ea          Seated HS stretch               L/S Ext stretch Standing  x10  Standing  x10  Standing  x10 Stand  x10                        Standing Hip ABD               Standing Hip Ext                                                            Ther Activity               Bike for LE mobility and endurance Upright  5' Upright  5' Upright  5' Upright  5' Upright  5' Upright  5' Upright  5'        Heel taps 4" 10x2 4" 10x2  4' 10x2  4" 10x2         Step Ups    6" 10x2           Leg Press 85# 2x10  SL 35#  2x10 85#  2x10  SL 35#  2x10 85#  2x10  SL 35#  2x10 85#  2x10  SL 35#  2x10 90#  2x10  SL 40#  2x10 90#  3x10  SL40#  3x10         Mini lunge    10x ea  10x ea on busu         TRX Squats 2x10  2x10 2x10           TRX Lateral Lunges               [place] Walkouts  F/B  10# x8 laps F/B  10# x8 laps  F/B  10# x8 laps          Lateral step overs               X walks BTB @ blue line  x2   BTB@ blue line x2 BTB@ blue line x2          STS with KB      5# 10x2                        Pt Ed HEP      HEP, POC        Re-Evaluation       DK        Modalities               Heat/ice (PRN) Ice 10' Ice 10' Ice 10'  Ice 10'  Ice 10'        TENS

## 2023-10-24 ENCOUNTER — HOSPITAL ENCOUNTER (OUTPATIENT)
Dept: ULTRASOUND IMAGING | Facility: HOSPITAL | Age: 33
Discharge: HOME | End: 2023-10-24
Attending: OBSTETRICS & GYNECOLOGY
Payer: COMMERCIAL

## 2023-10-24 DIAGNOSIS — N83.202 CYST OF LEFT OVARY: ICD-10-CM

## 2023-10-24 PROCEDURE — 76856 US EXAM PELVIC COMPLETE: CPT

## 2023-10-24 PROCEDURE — 76830 TRANSVAGINAL US NON-OB: CPT

## 2023-10-26 ENCOUNTER — OFFICE VISIT (OUTPATIENT)
Dept: PHYSICAL THERAPY | Facility: CLINIC | Age: 33
End: 2023-10-26
Payer: COMMERCIAL

## 2023-10-26 DIAGNOSIS — M25.551 PAIN OF RIGHT HIP: Primary | ICD-10-CM

## 2023-10-26 PROCEDURE — 97530 THERAPEUTIC ACTIVITIES: CPT

## 2023-10-26 PROCEDURE — 97112 NEUROMUSCULAR REEDUCATION: CPT

## 2023-10-26 PROCEDURE — 97110 THERAPEUTIC EXERCISES: CPT

## 2023-10-26 NOTE — PROGRESS NOTES
Daily Note     Today's date: 10/26/2023  Patient name: Lexii Adams  : 1990  MRN: [id number]  Referring provider: Tay Bhakta DO  Dx:   Encounter Diagnosis     ICD-10-CM    1. Pain of right hip  M25.551                      Subjective: Pt states that she is doing ok. She went for a long walk yesterday on paved surface for about 2.5 miles. Pt reports mild discomfort but if she slowed down she was able to tolerate. She iced afterward. Objective: See treatment diary below      Assessment: Tolerated treatment well. Continued to progress pt with strengthening as tolerated. Pt able to perform mini lunges with no reports of increased pain sx's. Pt has knee pain with STS so is only able to perform 10 reps. Less tenderness noted during manuals. Pt will benefit from continued therapy. Will progress pt as able. Plan: Continue per plan of care.       Diagnosis: Right hip pain  Precautions: GERD, anxiety/depression  ( * asterisk indicates given per HEP)  Next Physician Appointment:    Te Abreu:     Manuals 10/2 10/5 10/9 10/12 10/16 10/19 10/23 10/26       STM TH TH DK  DK TH DK TH       IASTM    TH DK TH         LE stretching               Joint Mobs       DK, short axis distract w/ ER/IR                       Neuro Re-Ed               PPT               TA ball press               Supine hip ADD ball sque               Supine BKFO               Wobble Board   x2' ea 2' ea           SLS W/ball toss 10x green  W/ball toss 10x green            SL RDL cone tap     4 cones on chair x3ea          Glut squeeze               Quad set               Tandem balance on foam               BOSU Step ups               Bridges over SB        10x2                      Ther Ex               Supine Hip Flexor               Butterfly stretch               LTRs no ball               Standing ADD stretch 10x10" ea 10x10" ea 10x10" ea 10x10"ea 10x10"ea          Seated HS stretch               L/S Ext stretch Standing  x10  Standing  x10  Standing  x10 Stand  x10                        Standing Hip ABD               Standing Hip Ext                                                            Ther Activity               Bike for LE mobility and endurance Upright  5' Upright  5' Upright  5' Upright  5' Upright  5' Upright  5' Upright  5' Upright  5'       Heel taps 4" 10x2 4" 10x2  4' 10x2  4" 10x2         Step Ups    6" 10x2           Leg Press 85# 2x10  SL 35#  2x10 85#  2x10  SL 35#  2x10 85#  2x10  SL 35#  2x10 85#  2x10  SL 35#  2x10 90#  2x10  SL 40#  2x10 90#  3x10  SL40#  3x10  95#  3x10  SL 40#  3x10       Mini lunge    10x ea  10x ea on busu  10x2 ea on bosu       TRX Squats 2x10  2x10 2x10           TRX Lateral Lunges               Corine Walkouts  F/B  10# x8 laps F/B  10# x8 laps  F/B  10# x8 laps   F/B  10# x8  laps       Lateral step overs               X walks BTB @ blue line  x2   BTB@ blue line x2 BTB@ blue line x2          STS with KB      5# 10x2  5# 10x                      Pt Ed HEP      HEP, POC HEP       Re-Evaluation       DK        Modalities               Heat/ice (PRN) Ice 10' Ice 10' Ice 10'  Ice 10'  Ice 10' Ice 10'       TENS

## 2023-10-30 ENCOUNTER — OFFICE VISIT (OUTPATIENT)
Dept: PHYSICAL THERAPY | Facility: CLINIC | Age: 33
End: 2023-10-30
Payer: COMMERCIAL

## 2023-10-30 DIAGNOSIS — M25.551 PAIN OF RIGHT HIP: Primary | ICD-10-CM

## 2023-10-30 PROCEDURE — 97112 NEUROMUSCULAR REEDUCATION: CPT

## 2023-10-30 PROCEDURE — 97110 THERAPEUTIC EXERCISES: CPT

## 2023-10-30 PROCEDURE — 97530 THERAPEUTIC ACTIVITIES: CPT

## 2023-10-30 NOTE — PROGRESS NOTES
Daily Note     Today's date: 10/30/2023  Patient name: Letty Fraser  : 1990  MRN: [id number]  Referring provider: Taylor Reyes DO  Dx:   Encounter Diagnosis     ICD-10-CM    1. Pain of right hip  M25.551                      Subjective: Pt states that she is doing well since lv. She reports that she didn't do a while lot this weekend to really test it besides some yard work. Objective: See treatment diary below      Assessment: Tolerated treatment well. Continued with outlined program as indicated and progressed where able. Pt has minimal discomfort/pulling in her R hip during DL leg press, however no other complaints with SL leg press. Tenderness is noted anterior hip during STM with less pain after. Discussed HEP with pt reporting understanding. Plan: Continue per plan of care.       Diagnosis: Right hip pain  Precautions: GERD, anxiety/depression  ( * asterisk indicates given per HEP)  Next Physician Appointment:    Clare Vides:     Manuals 10/2 10/5 10/9 10/12 10/16 10/19 10/23 10/26 10/30      STM TH TH DK  DK TH DK TH TH      IASTM    TH DK TH         LE stretching               Joint Mobs       DK, short axis distract w/ ER/IR                       Neuro Re-Ed               PPT               TA ball press               Supine hip ADD ball sque               Supine BKFO               Wobble Board   x2' ea 2' ea     2' ea      SLS W/ball toss 10x green  W/ball toss 10x green            SL RDL cone tap     4 cones on chair x3ea          Glut squeeze               Quad set               Tandem balance on foam               BOSU Step ups               Bridges over SB        10x2 10x2                     Ther Ex               Supine Hip Flexor               Butterfly stretch               LTRs no ball               Standing ADD stretch 10x10" ea 10x10" ea 10x10" ea 10x10"ea 10x10"ea          Seated HS stretch               L/S Ext stretch Standing  x10  Standing  x10  Standing  x10 Stand  x10                        Standing Hip ABD               Standing Hip Ext                                                            Ther Activity               Bike for LE mobility and endurance Upright  5' Upright  5' Upright  5' Upright  5' Upright  5' Upright  5' Upright  5' Upright  5' Upright 5'      Heel taps 4" 10x2 4" 10x2  4' 10x2  4" 10x2   4" 10x2      Step Ups    6" 10x2           Leg Press 85# 2x10  SL 35#  2x10 85#  2x10  SL 35#  2x10 85#  2x10  SL 35#  2x10 85#  2x10  SL 35#  2x10 90#  2x10  SL 40#  2x10 90#  3x10  SL40#  3x10  95#  3x10  SL 40#  3x10 95#  3x10  SL 40#  3x10      Mini lunge    10x ea  10x ea on busu  10x2 ea on bosu       TRX Squats 2x10  2x10 2x10     2x10      TRX Lateral Lunges               [place]ine Walkouts  F/B  10# x8 laps F/B  10# x8 laps  F/B  10# x8 laps   F/B  10# x8  laps       Lateral step overs               X walks BTB @ blue line  x2   BTB@ blue line x2 BTB@ blue line x2          STS with KB      5# 10x2  5# 10x                      Pt Ed HEP      HEP, POC HEP       Re-Evaluation       DK        Modalities               Heat/ice (PRN) Ice 10' Ice 10' Ice 10'  Ice 10'  Ice 10' Ice 10' Ice 10'      TENS

## 2023-11-02 ENCOUNTER — OFFICE VISIT (OUTPATIENT)
Dept: PHYSICAL THERAPY | Facility: CLINIC | Age: 33
End: 2023-11-02
Payer: COMMERCIAL

## 2023-11-02 DIAGNOSIS — M25.551 PAIN OF RIGHT HIP: Primary | ICD-10-CM

## 2023-11-02 PROCEDURE — 97112 NEUROMUSCULAR REEDUCATION: CPT | Performed by: PHYSICAL THERAPIST

## 2023-11-02 PROCEDURE — 97530 THERAPEUTIC ACTIVITIES: CPT | Performed by: PHYSICAL THERAPIST

## 2023-11-02 PROCEDURE — 97140 MANUAL THERAPY 1/> REGIONS: CPT | Performed by: PHYSICAL THERAPIST

## 2023-11-02 NOTE — PROGRESS NOTES
Daily Note     Today's date: 2023  Patient name: Breezy Johnson  : 1990  MRN: [id number]  Referring provider: Marija Deras DO  Dx:   Encounter Diagnosis     ICD-10-CM    1. Pain of right hip  M25.551           Start Time: 1000  Stop Time: 1045  Total time in clinic (min): 45 minutes    Subjective: Patient reports no new changes in pain. Objective: See treatment diary below      Assessment: Tolerated treatment well. Performed leg press and TRX squats with good form and no increase in pain. Added SL RDLs to chair to progress quad and glute control and stability. Patient had slight difficulty maintaining balance, but no reports of increased pain. Patient exhibited good technique with therapeutic exercises and would benefit from continued PT      Plan: Continue per plan of care. Progress treatment as tolerated.        Diagnosis: Right hip pain  Precautions: GERD, anxiety/depression  ( * asterisk indicates given per HEP)  Next Physician Appointment:    Delta Lorenzo:     Manuals 10/19 10/23 10/26 10/30 11/2        STM TH DK TH TH DK        [facility]    DK        LE stretching             Joint Mobs  DK, short axis distract w/ ER/IR                        Neuro Re-Ed             PPT             TA ball press             Supine hip ADD ball sque             Supine BKFO             Wobble Board    2' ea         SLS             SL RDL cone tap             Glut squeeze             Quad set             Tandem balance on foam             BOSU Step ups             Bridges over SB   10x2 10x2         SL RDL      To chair b/l x5ea                                  Ther Ex             Supine Hip Flexor             Butterfly stretch             LTRs no ball             Standing ADD stretch             Seated HS stretch             L/S Ext stretch Stand  x10    Stand 2x10                     Standing Hip ABD             Standing Hip Ext                                                    Ther Activity Bike for LE mobility and endurance Upright  5' Upright  5' Upright  5' Upright 5' Upright 5'        Heel taps 4" 10x2   4" 10x2         Step Ups             Leg Press 90#  3x10  SL40#  3x10  95#  3x10  SL 40#  3x10 95#  3x10  SL 40#  3x10 95#  3x10  SL 40#  3x10        Mini lunge 10x ea on busu  10x2 ea on bosu  10x2 ea on bosu        TRX Squats    2x10 2x10        TRX Lateral Lunges             [place] Walkouts   F/B  10# x8  laps  F/B  20# x8  laps        Lateral step overs             X walks             STS with KB 5# 10x2  5# 10x  5# 10x                     Pt Ed  HEP, POC HEP          Re-Evaluation  DK           Modalities             Heat/ice (PRN)  Ice 10' Ice 10' Ice 10' Ice 10'        TENS

## 2023-11-06 ENCOUNTER — OFFICE VISIT (OUTPATIENT)
Dept: PHYSICAL THERAPY | Facility: CLINIC | Age: 33
End: 2023-11-06
Payer: COMMERCIAL

## 2023-11-06 DIAGNOSIS — M25.551 PAIN OF RIGHT HIP: Primary | ICD-10-CM

## 2023-11-06 PROCEDURE — 97530 THERAPEUTIC ACTIVITIES: CPT

## 2023-11-06 PROCEDURE — 97112 NEUROMUSCULAR REEDUCATION: CPT

## 2023-11-06 PROCEDURE — 97140 MANUAL THERAPY 1/> REGIONS: CPT

## 2023-11-06 NOTE — PROGRESS NOTES
Daily Note     Today's date: 2023  Patient name: Kailyn Alvares  : 1990  MRN: [id number]  Referring provider: Adonis Martines DO  Dx:   Encounter Diagnosis     ICD-10-CM    1. Pain of right hip  M25.551             Start Time: 1046  Stop Time: 1140  Total time in clinic (min): 54 minutes    Subjective: Patient reports no new complaints or major changes since last session. Objective: See treatment diary below      Assessment: Tolerated treatment well. Progressed leg press by increasing weight and reps which was challenging, but overall tolerable for patient to complete all reps. Patient was moderately challenged with recently added SL RDL exercise, due to balance deficits vs strength deficits. Patient responded favorably to manual therapy and noted feeling relief at the conclusion of session. Continue to progress patient as able. Patient exhibited good technique with therapeutic exercises and would benefit from continued PT      Plan: Continue per plan of care. Progress treatment as tolerated.        Diagnosis: Right hip pain  Precautions: GERD, anxiety/depression  ( * asterisk indicates given per HEP)  Next Physician Appointment:    Betzy Art:     Manuals 10/19 10/23 10/26 10/30 11/2 11/6       [facility] TH DK TH TH DK KA       [facility]    DK KA       LE stretching             Joint Mobs  DK, short axis distract w/ ER/IR                        Neuro Re-Ed             PPT             TA ball press             Supine hip ADD ball sque             Supine BKFO             Wobble Board    2' ea         SLS             SL RDL cone tap             Glut squeeze             Quad set             Tandem balance on foam             BOSU Step ups             Bridges over SB   10x2 10x2         SL RDL      To chair b/l x5ea To chair b/l x5ea                                 Ther Ex             Supine Hip Flexor             Butterfly stretch             LTRs no ball             Standing ADD stretch Seated HS stretch             L/S Ext stretch Stand  x10    Stand 2x10 Stand 2x10                    Standing Hip ABD             Standing Hip Ext                                                    Ther Activity             Bike for LE mobility and endurance Upright  5' Upright  5' Upright  5' Upright 5' Upright 5' Upright 5'       Heel taps 4" 10x2   4" 10x2         Step Ups             Leg Press 90#  3x10  SL40#  3x10  95#  3x10  SL 40#  3x10 95#  3x10  SL 40#  3x10 95#  3x10  SL 40#  3x10 95#  3x10  SL 50#  2x10       Mini lunge 10x ea on busu  10x2 ea on bosu  10x2 ea on bosu 2x10 ea on bosu       TRX Squats    2x10 2x10 2x10       TRX Lateral Lunges             [place] Walkouts   F/B  10# x8  laps  F/B  20# x8  laps F/B  20# x8  laps       Lateral step overs             X walks             STS with KB 5# 10x2  5# 10x  5# 10x 5# 2x10                    Pt Ed  HEP, POC HEP          Re-Evaluation  DK           Modalities             Heat/ice (PRN)  Ice 10' Ice 10' Ice 10' Ice 10' Ice 10'       TENS                     [id number]

## 2023-11-09 ENCOUNTER — OFFICE VISIT (OUTPATIENT)
Dept: PHYSICAL THERAPY | Facility: CLINIC | Age: 33
End: 2023-11-09
Payer: COMMERCIAL

## 2023-11-09 DIAGNOSIS — M25.551 PAIN OF RIGHT HIP: Primary | ICD-10-CM

## 2023-11-09 PROCEDURE — 97140 MANUAL THERAPY 1/> REGIONS: CPT

## 2023-11-09 PROCEDURE — 97112 NEUROMUSCULAR REEDUCATION: CPT

## 2023-11-09 PROCEDURE — 97530 THERAPEUTIC ACTIVITIES: CPT

## 2023-11-09 NOTE — PROGRESS NOTES
Daily Note     Today's date: 2023  Patient name: Marilyn Reed  : 1990  MRN: [id number]  Referring provider: Rachna Gongora DO  Dx:   Encounter Diagnosis     ICD-10-CM    1. Pain of right hip  M25.551                      Subjective: Pt states that she did a lot of yard work yesterday and feels achy this morning. Pt reports that she feels some cracking in her hip and it can be painful at times. Objective: See treatment diary below      Assessment: Tolerated treatment well. Continued with outlined program and progressed with strengthening as tolerated. Pt notes minimal discomfort with heel taps on 6" however with increased reps she had no pain. Tenderness throughout ant and lateral hip during IASTM with decreased tenderness noted after. Fatigue is noted with mini goblet squats however pt had no hip or knee pain. Plan: Continue per plan of care.       Diagnosis: Right hip pain  Precautions: GERD, anxiety/depression  ( * asterisk indicates given per HEP)  Next Physician Appointment:    Vincenzo Woods:     Manuals 10/19 10/23 10/26 10/30 11/2 11/6 11/9      STM TH DK TH TH DK KA       IASTM TH    DK KA TH      LE stretching             Joint Mobs  DK, short axis distract w/ ER/IR                        Neuro Re-Ed             PPT             TA ball press             Supine hip ADD ball sque             Supine BKFO             Wobble Board    2' ea   2' ea      SLS             SL RDL cone tap             Glut squeeze             Quad set             Tandem ambulation on foam f/b             BOSU Step ups             Bridges over SB   10x2 10x2         SL RDL      To chair b/l x5ea To chair b/l x5ea                                 Ther Ex             Supine Hip Flexor             Butterfly stretch             LTRs no ball             Standing ADD stretch             Seated HS stretch             L/S Ext stretch Stand  x10    Stand 2x10 Stand 2x10 Stand 2x10                   Standing Hip ABD             Standing Hip Ext                                                    Ther Activity             Bike for LE mobility and endurance Upright  5' Upright  5' Upright  5' Upright 5' Upright 5' Upright 5' Upright 5'      Heel taps 4" 10x2   4" 10x2   6" 10x2      Step Ups             Leg Press 90#  3x10  SL40#  3x10  95#  3x10  SL 40#  3x10 95#  3x10  SL 40#  3x10 95#  3x10  SL 40#  3x10 95#  3x10  SL 50#  2x10 95#  3x10  SL 50#  3x10      Mini lunge 10x ea on busu  10x2 ea on bosu  10x2 ea on bosu 2x10 ea on bosu       TRX Squats    2x10 2x10 2x10       TRX Lateral Lunges             [place] Walkouts   F/B  10# x8  laps  F/B  20# x8  laps F/B  20# x8  laps       Lateral step overs       6" 10x2      X walks             STS with KB 5# 10x2  5# 10x  5# 10x 5# 2x10       Goblet squat to chair       5# 10x2      Pt Ed  HEP, POC HEP          Re-Evaluation  DK           Modalities             Heat/ice (PRN)  Ice 10' Ice 10' Ice 10' Ice 10' Ice 10' Ice 10'      TENS                     [id number]

## 2023-11-13 ENCOUNTER — OFFICE VISIT (OUTPATIENT)
Dept: PHYSICAL THERAPY | Facility: CLINIC | Age: 33
End: 2023-11-13
Payer: COMMERCIAL

## 2023-11-13 DIAGNOSIS — M25.551 PAIN OF RIGHT HIP: Primary | ICD-10-CM

## 2023-11-13 PROCEDURE — 97530 THERAPEUTIC ACTIVITIES: CPT | Performed by: PHYSICAL THERAPIST

## 2023-11-13 PROCEDURE — 97110 THERAPEUTIC EXERCISES: CPT | Performed by: PHYSICAL THERAPIST

## 2023-11-13 PROCEDURE — 97140 MANUAL THERAPY 1/> REGIONS: CPT | Performed by: PHYSICAL THERAPIST

## 2023-11-13 NOTE — PROGRESS NOTES
Daily Note     Today's date: 2023  Patient name: Ricky Yadav  : 1990  MRN: [id number]  Referring provider: Lore Cardenas DO  Dx:   Encounter Diagnosis     ICD-10-CM    1. Pain of right hip  M25.551           Start Time: 1045  Stop Time: 1130  Total time in clinic (min): 45 minutes    Subjective: Patient reports slightly improve pain levels. She reports she had some pain with driving due to brake / accelerating using Right LE. Objective: See treatment diary below      Assessment: Tolerated treatment well. Focused on core strengthening today. Added Paloff press to engage multifidus and obliques, and also TA control for better posture. Added KB carry with step ups with improve TA strength and posture control with stairs. Patient noted to have significant tenderness along hip flexors, relieved slightly with STM. Patient exhibited good technique with therapeutic exercises and would benefit from continued PT      Plan: Continue per plan of care. Progress treatment as tolerated.        Diagnosis: Right hip pain  Precautions: GERD, anxiety/depression  ( * asterisk indicates given per HEP)  Next Physician Appointment:    Freddie Chahal:     Manuals          STM DK KA  DK         IASTM JIA KA TH          LE stretching             Joint Mobs                          Neuro Re-Ed             PPT             TA ball press             Supine hip ADD ball sque             Supine BKFO             Wobble Board   2' ea 2' ea         SLS             SL RDL cone tap             Glut squeeze             Quad set             Tandem ambulation on foam f/b             BOSU Step ups             Bridges over SB             SL RDL  To chair b/l x5ea To chair b/l x5ea                                     Ther Ex             Supine Hip Flexor             Butterfly stretch             LTRs no ball             Standing ADD stretch             Seated HS stretch             L/S Ext stretch Stand 2x10 Stand 2x10 Stand 2x10 Stand 2x10                      Standing Hip ABD             Standing Hip Ext                                                    Ther Activity             Bike for LE mobility and endurance Upright 5' Upright 5' Upright 5' Upright 5'         Heel taps   6" 10x2          Step Ups    6" with 5# KB carry x10ea         Leg Press 95#  3x10  SL 40#  3x10 95#  3x10  SL 50#  2x10 95#  3x10  SL 50#  3x10          Mini lunge 10x2 ea on bosu 2x10 ea on bosu           TRX Squats 2x10 2x10           TRX Lateral Lunges             [place] Walkouts F/B  20# x8  laps F/B  20# x8  laps  F/B  20# x10  laps         [place] Paloff Press    10# 2x10ea         Lateral step overs   6" 10x2          X walks             STS with KB 5# 10x 5# 2x10           Goblet squat to chair   5# 10x2          Pt Ed    POC         Re-Evaluation             Modalities             Heat/ice (PRN) Ice 10' Ice 10' Ice 10' Ice 10'         TENS

## 2023-11-16 ENCOUNTER — OFFICE VISIT (OUTPATIENT)
Dept: PHYSICAL THERAPY | Facility: CLINIC | Age: 33
End: 2023-11-16
Payer: COMMERCIAL

## 2023-11-16 DIAGNOSIS — M25.551 PAIN OF RIGHT HIP: Primary | ICD-10-CM

## 2023-11-16 PROCEDURE — 97112 NEUROMUSCULAR REEDUCATION: CPT

## 2023-11-16 PROCEDURE — 97530 THERAPEUTIC ACTIVITIES: CPT

## 2023-11-16 PROCEDURE — 97110 THERAPEUTIC EXERCISES: CPT

## 2023-11-16 NOTE — PROGRESS NOTES
Daily Note     Today's date: 2023  Patient name: Sander Vegas  : 1990  MRN: [id number]  Referring provider: Torri Abel DO  Dx:   Encounter Diagnosis     ICD-10-CM    1. Pain of right hip  M25.551                      Subjective: Pt states that she is a little more sore now, as she had increased pain when carrying groceries up the stairs. Pt reports that she had a sharp pain yesterday and it still feels "pulled" today. Objective: See treatment diary below      Assessment: Tolerated treatment well. Progressed pt with core strengthening to improve overall function and support/stabilization. Muscle fatigue noted with modified planks, no increased sx's. Minimal discomfort noted during goblet squats, however it was minimal and she was able to perform 2 sets of 10. Tenderness noted ant hip during manuals. Pt progressing slowly towards her goals and will benefit from continued therapy. Plan: Continue per plan of care.       Diagnosis: Right hip pain  Precautions: GERD, anxiety/depression  ( * asterisk indicates given per HEP)  Next Physician Appointment:    Juan Manleying:     Manuals         STM DK KA  DK TH        IASTM DK KA TH          LE stretching             Joint Mobs                          Neuro Re-Ed             PPT             TA ball press             Supine hip ADD ball sque             Supine BKFO             Wobble Board   2' ea 2' ea 2' ea        SLS             SL RDL cone tap             Glut squeeze             Quad set             Tandem ambulation on foam f/b             BOSU Step ups             Bridges over SB     20x        SL RDL  To chair b/l x5ea To chair b/l x5ea           Modified planks     On boxes/elbows 20"x3        Quadruped TA (cat)+ alt UE     10x3"  10x ea UE        Ther Ex             Supine Hip Flexor             Butterfly stretch             LTRs no ball             Standing ADD stretch             Seated HS stretch L/S Ext stretch Stand 2x10 Stand 2x10 Stand 2x10 Stand 2x10                      Standing Hip ABD             Standing Hip Ext                                                    Ther Activity             Bike for LE mobility and endurance Upright 5' Upright 5' Upright 5' Upright 5' Upright  5'        Heel taps   6" 10x2          Step Ups    6" with 5# KB carry x10ea         Leg Press 95#  3x10  SL 40#  3x10 95#  3x10  SL 50#  2x10 95#  3x10  SL 50#  3x10          Mini lunge 10x2 ea on bosu 2x10 ea on bosu           TRX Squats 2x10 2x10           TRX Lateral Lunges             [place]ine Walkouts F/B  20# x8  laps F/B  20# x8  laps  F/B  20# x10  laps         [place] Paloff Press    10# 2x10ea 10#  2x10ea        Lateral step overs   6" 10x2          X walks             STS with KB 5# 10x 5# 2x10           Goblet squat to chair   5# 10x2  5# 10x2        Pt Ed    POC HEP        Re-Evaluation             Modalities             Heat/ice (PRN) Ice 10' Ice 10' Ice 10' Ice 10' Ice 10'        TENS

## 2023-11-20 ENCOUNTER — OFFICE VISIT (OUTPATIENT)
Dept: PHYSICAL THERAPY | Facility: CLINIC | Age: 33
End: 2023-11-20
Payer: COMMERCIAL

## 2023-11-20 DIAGNOSIS — M25.551 PAIN OF RIGHT HIP: Primary | ICD-10-CM

## 2023-11-20 PROCEDURE — 97530 THERAPEUTIC ACTIVITIES: CPT

## 2023-11-20 PROCEDURE — 97112 NEUROMUSCULAR REEDUCATION: CPT

## 2023-11-20 PROCEDURE — 97110 THERAPEUTIC EXERCISES: CPT

## 2023-11-20 NOTE — PROGRESS NOTES
Daily Note     Today's date: 2023  Patient name: Esau Lou  : 1990  MRN: [id number]  Referring provider: Svitlana Crooks DO  Dx:   Encounter Diagnosis     ICD-10-CM    1. Pain of right hip  M25.551                      Subjective: Pt states that she is still a little sore and tight in her hip. She did some of the core ex's at home this morning. Objective: See treatment diary below      Assessment: Tolerated treatment well. Progressed pt with hip and core strengthening as indicated. Pt challenged with wall sits due to weakness and muscle fatigue is noted. Cues for form needed with goblet squats with good carryover noted. IASTM to anterior hip with tenderness/tightness noted. Will continue to progress pt as tolerated. Plan: Continue per plan of care.       Diagnosis: Right hip pain  Precautions: GERD, anxiety/depression  ( * asterisk indicates given per HEP)  Next Physician Appointment:    Louis Raines:     Manuals        STM JIA KA   TH        IASTM JIA KA TH   TH       LE stretching             Joint Mobs                          Neuro Re-Ed             PPT             TA ball press             Supine hip ADD ball sque             Supine BKFO             Wobble Board   2' ea 2' ea 2' ea 2'ea       SLS             SL RDL cone tap             Glut squeeze             Quad set             Tandem ambulation on foam f/b             BOSU Step ups             Bridges over SB     20x        SL RDL  To chair b/l x5ea To chair b/l x5ea           Modified planks     On boxes/elbows 20"x3        Quadruped TA (cat)+ alt UE     10x3"  10x ea UE        Ther Ex             Supine Hip Flexor             Butterfly stretch             LTRs no ball             Standing ADD stretch             Seated HS stretch             L/S Ext stretch Stand 2x10 Stand 2x10 Stand 2x10 Stand 2x10  Stand  2x10       Piriformis stretch      10x10"       Standing Hip ABD Standing Hip Ext                                                    Ther Activity             Bike for LE mobility and endurance Upright 5' Upright 5' Upright 5' Upright 5' Upright  5' Upright  5'       Heel taps   6" 10x2          Step Ups    6" with 5# KB carry x10ea         Leg Press    Seat 6 95#  3x10  SL 40#  3x10 95#  3x10  SL 50#  2x10 95#  3x10  SL 50#  3x10   100#  3x10  SL 50#  3x10       Mini lunge 10x2 ea on bosu 2x10 ea on bosu           TRX Squats 2x10 2x10           TRX Lateral Lunges             [place] Walkouts F/B  20# x8  laps F/B  20# x8  laps  F/B  20# x10  laps  F/B  20# 10 laps       [place] Paloff Press    10# 2x10ea 10#  2x10ea        Wall sits with press             Lateral step overs   6" 10x2          X walks             STS with KB 5# 10x 5# 2x10           Goblet squat to chair   5# 10x2  5# 10x2 5# 10x2       Pt Ed    POC HEP        Re-Evaluation             Modalities             Heat/ice (PRN) Ice 10' Ice 10' Ice 10' Ice 10' Ice 10' Ice 10'       TENS

## 2023-11-21 NOTE — PROGRESS NOTES
PT Re-Evaluation     Today's date: 2023  Patient name: Melany Garcia  : 1990  MRN: [id number]  Referring provider: Carly Esqueda DO  Dx:   Encounter Diagnosis     ICD-10-CM    1. Pain of right hip  M25.551             Start Time: 1045  Stop Time: 1130  Total time in clinic (min): 45 minutes    Assessment  Assessment details: Patient is a 34 y.o. female who presents to outpatient PT with reports of Right hip pain that started about 8 months ago. Patient has been making steady progress in overall function since start of PT. Improved pain levels to 3-4/10 at worst (compared to 5/10 at last re-eval). Describes pain primarily as soreness and achiness at most. She reports there are times where she notices 0/10 pain, or more manageable symptoms that last about 1-2 days. She reports improved tolerance and overall ability performing sitting, standing, and other functional tasks. Her primary concerns continue to be instability and issues with balance getting up from chair, squatting, and ascending stairs. She continues to have tightness with standing flexion / bending over tasks. She continues to have pain with scour, LEONARDA, and FADIR testings. Joint mobs posterio-inferior and lateral directions cause some pain, however short-axis distraction provides some relief. Improve glute and hip flexor muscle strength noted overall. However, current pain levels affect her participation and tolerance with ADLs, household chores, and other daily functional activities. She continues to have some muscle fatigue with prolonged activity, with muscle soreness reported that reduces with rest and cold pack. She lives with her parents and sister in a 2-story home, and thus has to navigate stairs frequently in the day. Patient has overall reported improved tolerance and positive response to PT interventions and exercises thus far.  She was recommended by orthopedic specialist to continue further PT as it has been assisting with her functional mobility thus far. Patient will benefit from continued skilled PT services to improve her pain levels, improve mobility, improve strength, and improve overall ease with ADLs, caring for her family, and household chores. Patient would benefit from skilled PT services to address these impairments, to maximize function, and to return her to max potential of PLOF. Thank you for the referral.  Impairments: abnormal or restricted ROM, activity intolerance, impaired physical strength, pain with function, weight-bearing intolerance and poor posture   Functional limitations: ascending stairs, squattingBarriers to therapy: Chronicity of symptoms  Understanding of Dx/Px/POC: good   Prognosis: good    Goals  Impairment Goals 4-6 weeks   In order to maximize function patient will be able to. ..   - Decrease intensity/duration/frequency of pain to 4/10. Improved  - Demonstrate symmetrical hip AROM without pain. Improved  - Increase hip strength to 4/5 throughout to improve mechanics and stability with walking, standing, etc. Improved  - Demonstrate improved hip flexibility as demonstrated by increased ROM through therapeutic exercise. Improving, continues to have difficulty with HS flexibility / stretching    Functional Goals 6-8 weeks  In order to return to prior level of function patient will be able to. ..   - Participate in ADL's/IADL's/sport specific activities with no greater than 2/10 pain. Improved  - Increase Functional Status Measure (FOTO) to: anticipated at discharge. Met  - Demonstrate independence and compliant with HEP. Met  - Demonstrate a squat and or sit to stand with good mechanics and eccentric control without pain/difficulty/compensation. Improved, pain with squatting and sit-stands  - Demonstrate functional activities with good core and glute strength without compensation/pain/difficulty.  Partially Met   - Ascend and descend stairs without increased pain/compensation/difficulty and a reciprocal gait pattern. Partially Met, ascending has some discomfort/instability    Plan  Patient would benefit from: skilled PT  Planned modality interventions: cryotherapy  Other planned modality interventions: moist heat  Planned therapy interventions: joint mobilization, manual therapy, neuromuscular re-education, patient education, strengthening, stretching, therapeutic activities, therapeutic exercise, home exercise program, functional ROM exercises, Cristina taping, postural training, balance/weight bearing training, body mechanics training, flexibility, massage, nerve gliding, kinesiology taping, IASTM, transfer training and gait training  Frequency: 1-2x/week. Duration in weeks: 4  Treatment plan discussed with: patient, PTA and referring physician        Subjective Evaluation    History of Present Illness  Mechanism of injury: Jimmy Jensen is a 34y.o. year-old female who presents to outpatient PT with reports of Right hip pain that started 6 months ago. Patient reports history of fall about 2 years ago, however mild pain at the time that reduced quickly. She reports no known RIANA of recent pain. She reports she started to notice it while putting on shoes/socks and performing bending forward activities. Patient saw her PCP on 2023 and was recommended for PT at this time. Patient did not receive any imaging.    Quality of life: good    Patient Goals  Patient goals for therapy: decreased pain, increased motion, improved balance, increased strength, independence with ADLs/IADLs and return to sport/leisure activities    Pain  Current pain ratin  At best pain ratin  At worst pain ratin  Location: Right hip  Quality: dull ache  Relieving factors: rest and heat  Aggravating factors: sitting, stair climbing and lifting  Progression: improved    Social Support  Steps to enter house: yes  Stairs in house: yes   Lives in: multiple-level home  Lives with: parents    Employment status: not working  Exercise history: walking      Diagnostic Tests  No diagnostic tests performed  Treatments  Current treatment: physical therapy        Objective     Observations     Additional Observation Details  Standing posture: Right hip, patella, and medial malleolus slightly lower than Left  Gait Assessment: slight Right hip drop; increased Right trunk rotation    Lumbar Screen  Lumbar range of motion within normal limits with the following exceptions:Flexion: WNLs with tightness; repeated movements slightly increased soreness  Extension: WNLs with reduced pain; repeated movements reduced pain  Side-bending and Rotation: WNLs; mild Right hip pain with Right SBing    Active Range of Motion   Left Hip   Normal active range of motion    Right Hip   Flexion: 120 degrees with pain  Extension: 10 degrees   Abduction: 30 degrees   External rotation (90/90): 45 (mild) degrees with pain  Internal rotation (90/90): Right hip active internal rotation 90/90: mild. WFL and with pain    Strength/Myotome Testing     Right Hip   Planes of Motion   Flexion: 4- (painful with supine SLR)  Extension: 3+  Abduction: 4  Adduction: 4-  External rotation: 4- (painful)  Internal rotation: 4- (painful)    Tests     Right Hip   Positive LEONARDA, FADIR, Gaenslen's and scour. Negative Justino. Additional Tests Details  On Re-eval: LLD (supine) bilaterally equal    On IE:  LLD (supine): Right leg "longer" than Left  Supine to Long-Sit Test: Right LE long to short (ALS)      Subjective: Patient reports overall pain levels have improved, mainly describes as soreness/achiness. She reports overall improved function since start of PT, with improved strength. She continues to have difficulty/instability with ascending stairs and sit-stands from chair.         Diagnosis: Right hip pain  Precautions: GERD, anxiety/depression  ( * asterisk indicates given per HEP)  Next Physician Appointment:    Savannah Mtz:     Manuals 11/13 11/16 11/20 11/22 STM DK TH             IASTM   TH DK, ant hip           LE stretching               Joint Mobs                              Neuro Re-Ed               PPT               TA ball press               Supine hip ADD ball sque               Supine BKFO               Wobble Board 2' ea 2' ea 2'ea            SLS               SL RDL cone tap               Glut squeeze               Quad set               Tandem ambulation on foam f/b               BOSU Step ups               Bridges over SB  20x             SL RDL                Modified planks  On boxes/elbows 20"x3             Quadruped TA (cat)+ alt UE  10x3"  10x ea UE             Ther Ex               Supine Hip Flexor               Butterfly stretch               LTRs no ball               Standing ADD stretch               Seated HS stretch               L/S Ext stretch Stand 2x10  Stand  2x10            Piriformis stretch   10x10"            Standing Hip ABD               Standing Hip Ext                                                            Ther Activity               Bike for LE mobility and endurance Upright 5' Upright  5' Upright  5' Upright  5'           Heel taps               Step Ups 6" with 5# KB carry x10ea              Leg Press    Seat 6   100#  3x10  SL 50#  3x10            Mini lunge               TRX Squats               TRX Lateral Lunges               [place] Walkouts F/B  20# x10  laps  F/B  20# 10 laps            [place] Paloff Press 10# 2x10ea 10#  2x10ea             Wall sits with press               Lateral step overs               X walks               STS with KB               Goblet squat to chair  5# 10x2 5# 10x2            Pt Ed POC HEP  POC           Re-Evaluation    DK           Modalities               Heat/ice (PRN) Ice 10' Ice 10' Ice 10' Ice 10'           TENS

## 2023-11-22 ENCOUNTER — EVALUATION (OUTPATIENT)
Dept: PHYSICAL THERAPY | Facility: CLINIC | Age: 33
End: 2023-11-22
Payer: COMMERCIAL

## 2023-11-22 DIAGNOSIS — M25.551 PAIN OF RIGHT HIP: Primary | ICD-10-CM

## 2023-11-22 PROCEDURE — 97140 MANUAL THERAPY 1/> REGIONS: CPT | Performed by: PHYSICAL THERAPIST

## 2023-11-22 PROCEDURE — 97530 THERAPEUTIC ACTIVITIES: CPT | Performed by: PHYSICAL THERAPIST

## 2023-11-22 PROCEDURE — 97112 NEUROMUSCULAR REEDUCATION: CPT | Performed by: PHYSICAL THERAPIST

## 2023-11-28 ENCOUNTER — OFFICE VISIT (OUTPATIENT)
Dept: PHYSICAL THERAPY | Facility: CLINIC | Age: 33
End: 2023-11-28
Payer: COMMERCIAL

## 2023-11-28 DIAGNOSIS — M25.551 PAIN OF RIGHT HIP: Primary | ICD-10-CM

## 2023-11-28 PROCEDURE — 97110 THERAPEUTIC EXERCISES: CPT

## 2023-11-28 PROCEDURE — 97530 THERAPEUTIC ACTIVITIES: CPT

## 2023-11-28 PROCEDURE — 97112 NEUROMUSCULAR REEDUCATION: CPT

## 2023-11-28 NOTE — PROGRESS NOTES
Daily Note     Today's date: 2023  Patient name: Melany Garcia  : 1990  MRN: [id number]  Referring provider: Carly Esqueda DO  Dx:   Encounter Diagnosis     ICD-10-CM    1. Pain of right hip  M25.551                      Subjective: Pt states that her hip is a little more sore today but she is unsure why. Objective: See treatment diary below      Assessment: Tolerated treatment well. Continued with outlined program to improve overall LE strength with good tolerance to ex's. Pt had no reports of increased s'x with squats and lunges in small range. Tenderness/tightness noted with IASTM which was minimally decreased after. Muscle fatigue noted with SB bridges. Pt progressing slowly towards her goals and will benefit from continued therapy. Plan: Continue per plan of care.       Diagnosis: Right hip pain  Precautions: GERD, anxiety/depression  ( * asterisk indicates given per HEP)  Next Physician Appointment:    Helga Brandt:     Manuals           STM DK TH             IASTM   TH DK, ant hip TH, ant hip          LE stretching               Joint Mobs                              Neuro Re-Ed               PPT               TA ball press               Supine hip ADD ball sque               Supine BKFO               Wobble Board 2' ea 2' ea 2'ea  2' ea          SLS               SL RDL cone tap               Glut squeeze               Quad set               Tandem ambulation on foam f/b               BOSU Step ups               Bridges over SB  20x   20x          SL RDL                Modified planks  On boxes/elbows 20"x3             Quadruped TA (cat)+ alt UE  10x3"  10x ea UE             Ther Ex               Supine Hip Flexor               Butterfly stretch               LTRs no ball               Standing ADD stretch               Seated HS stretch               L/S Ext stretch Stand 2x10  Stand  2x10            Piriformis stretch   10x10" Standing Hip ABD               Standing Hip Ext                                                            Ther Activity               Bike for LE mobility and endurance Upright 5' Upright  5' Upright  5' Upright  5' Upright  5'          Heel taps               Step Ups 6" with 5# KB carry x10ea              Leg Press    Seat 6   100#  3x10  SL 50#  3x10  100#  3x10  SL 50#  3x10          Mini lunge     Alt on bosu 10xea          TRX Squats     20x          TRX Lateral Lunges               [place] Walkouts F/B  20# x10  laps  F/B  20# 10 laps            Corine Paloff Press 10# 2x10ea 10#  Nabor Jones sits with press               Lateral step overs               X walks               STS with KB               Goblet squat to chair  5# 10x2 5# 10x2            Pt Ed POC HEP  POC           Re-Evaluation    DK           Modalities               Heat/ice (PRN) Ice 10' Ice 10' Ice 10' Ice 10' Ice 10'          TENS

## 2023-11-30 ENCOUNTER — OFFICE VISIT (OUTPATIENT)
Dept: PHYSICAL THERAPY | Facility: CLINIC | Age: 33
End: 2023-11-30
Payer: COMMERCIAL

## 2023-11-30 DIAGNOSIS — M25.551 PAIN OF RIGHT HIP: Primary | ICD-10-CM

## 2023-11-30 PROCEDURE — 97530 THERAPEUTIC ACTIVITIES: CPT

## 2023-11-30 PROCEDURE — 97110 THERAPEUTIC EXERCISES: CPT

## 2023-11-30 PROCEDURE — 97112 NEUROMUSCULAR REEDUCATION: CPT

## 2023-11-30 NOTE — PROGRESS NOTES
Daily Note     Today's date: 2023  Patient name: Jimmy Jensen  : 1990  MRN: [id number]  Referring provider: Shahram Wolf DO  Dx:   Encounter Diagnosis     ICD-10-CM    1. Pain of right hip  M25.551                      Subjective: Pt states that she is having less hip pain than her last visit. It has been better since. Objective: See treatment diary below      Assessment: Tolerated treatment well. Focused on overall core and hip strengthening with only c/o pain was with SLS. Cues for goblet squat to improve squat with good carryover noted. IASTM to ant hip with less restrictions noted. Pt progressing slowly towards her goals and will benefit from continued therapy. Plan: Continue per plan of care.       Diagnosis: Right hip pain  Precautions: GERD, anxiety/depression  ( * asterisk indicates given per HEP)  Next Physician Appointment:    Vinh Anna:     Manuals          STM DK TH             IASTM   TH DK, ant hip TH, ant hip TH ant hip         LE stretching               Joint Mobs                              Neuro Re-Ed               PPT               TA ball press               Supine hip ADD ball sque               Supine BKFO               Wobble Board 2' ea 2' ea 2'ea  2' ea          SLS      3x30" ea         SL RDL cone tap               Glut squeeze               Quad set               Tandem ambulation on foam f/b               BOSU Step ups               Bridges over SB  20x   20x          SL RDL                Modified planks  On boxes/elbows 20"x3             Quadruped TA (cat)+ alt UE  10x3"  10x ea UE             Ther Ex               Supine Hip Flexor               Butterfly stretch               LTRs no ball               Standing ADD stretch               Seated HS stretch               L/S Ext stretch Stand 2x10  Stand  2x10            Piriformis stretch   10x10"            Standing Hip ABD               Standing Hip Ext Ther Activity               Bike for LE mobility and endurance Upright 5' Upright  5' Upright  5' Upright  5' Upright  5' Upright  5'         Heel taps               Step Ups 6" with 5# KB carry x10ea              Leg Press    Seat 6   100#  3x10  SL 50#  3x10  100#  3x10  SL 50#  3x10 100#  3x10  SL 50#  3x10         Mini lunge     Alt on bosu 10xea          TRX Squats     20x          TRX Lateral Lunges               [place] Walkouts F/B  20# x10  laps  F/B  20# 10 laps   F/B  20#  10 laps         Delia Paloff Press 10# 2x10ea 10#  2x10ea    10#   2x10 Lance [place] sits with press               Lateral step overs               X walks      RTB 2x         STS with KB               Goblet squat to chair  5# 10x2 5# 10x2   5# 10x2         Pt Ed POC HEP  POC           Re-Evaluation    DK           Modalities               Heat/ice (PRN) Ice 10' Ice 10' Ice 10' Ice 10' Ice 10' Ice 10'         TENS

## 2023-12-05 ENCOUNTER — OFFICE VISIT (OUTPATIENT)
Dept: PHYSICAL THERAPY | Facility: CLINIC | Age: 33
End: 2023-12-05
Payer: COMMERCIAL

## 2023-12-05 DIAGNOSIS — M25.551 PAIN OF RIGHT HIP: Primary | ICD-10-CM

## 2023-12-05 PROCEDURE — 97140 MANUAL THERAPY 1/> REGIONS: CPT | Performed by: PHYSICAL THERAPIST

## 2023-12-05 PROCEDURE — 97530 THERAPEUTIC ACTIVITIES: CPT | Performed by: PHYSICAL THERAPIST

## 2023-12-05 PROCEDURE — 97112 NEUROMUSCULAR REEDUCATION: CPT | Performed by: PHYSICAL THERAPIST

## 2023-12-05 NOTE — PROGRESS NOTES
Daily Note     Today's date: 2023  Patient name: Genevieve Jenkins  : 1990  MRN: [id number]  Referring provider: Alexx Vu DO  Dx:   Encounter Diagnosis     ICD-10-CM    1. Pain of right hip  M25.551           Start Time: 1045  Stop Time: 1130  Total time in clinic (min): 45 minutes    Subjective: Patient reports no new changes in pain. She is seeing Dr. Ivory Grajeda tomorrow for follow-up. Objective: See treatment diary below      Assessment: Tolerated treatment well. Worked on and progressed core endurance and strength today. Added Dirty Baby exercise to improve core engagement / activation with walking tasks. Patient reported some soreness in hip end of session but no increase in pain. Patient exhibited good technique with therapeutic exercises and would benefit from continued PT      Plan: Continue per plan of care. Progress treatment as tolerated.        Diagnosis: Right hip pain  Precautions: GERD, anxiety/depression  ( * asterisk indicates given per HEP)  Next Physician Appointment:   Doc HEP:     Manuals         STM DK TH             IASTM   TH DK, ant hip TH, ant hip TH ant hip TH ant hip        LE stretching               Joint Mobs                              Neuro Re-Ed               PPT               TA ball press               Supine hip ADD ball sque               Supine BKFO               Wobble Board 2' ea 2' ea 2'ea  2' ea          SLS      3x30" ea         SL RDL cone tap               Glut squeeze               Quad set               Tandem ambulation on foam f/b               BOSU Step ups               Bridges over SB  20x   20x          SL RDL                Modified planks  On boxes/elbows 20"x3             Quadruped TA (cat)+ alt UE  10x3"  10x ea UE             Ther Ex               Supine Hip Flexor               Butterfly stretch               LTRs no ball               Standing ADD stretch               Seated HS stretch               L/S Ext stretch Stand 2x10  Stand  2x10            Piriformis stretch   10x10"            Standing Hip ABD               Standing Hip Ext                                                            Ther Activity               Bike for LE mobility and endurance Upright 5' Upright  5' Upright  5' Upright  5' Upright  5' Upright  5' Upright  5'        Heel taps               Step Ups 6" with 5# KB carry x10ea              Leg Press    Seat 6   100#  3x10  SL 50#  3x10  100#  3x10  SL 50#  3x10 100#  3x10  SL 50#  3x10 100#  3x10  SL 50#  3x10        Mini lunge     Alt on bosu 10xea          TRX Squats     20x          TRX Lateral Lunges               [place] Walkouts F/B  20# x10  laps  F/B  20# 10 laps   F/B  20#  10 laps F/B w/ bar  10#  X5 laps ea        Corine Paloff Press 10# 2x10ea 10#  2x10ea    10#   2x10 ea 10#   2x10 Dionte Nestorson sits with press               Lateral step overs               X walks      RTB 2x GTB 2x        STS with KB               Goblet squat to chair  5# 10x2 5# 10x2   5# 10x2         Dirty Baby       5# KB x3 laps                       Pt Ed POC HEP  POC           Re-Evaluation    DK           Modalities               Heat/ice (PRN) Ice 10' Ice 10' Ice 10' Ice 10' Ice 10' Ice 10' Ice 10'        TENS

## 2023-12-06 ENCOUNTER — OFFICE VISIT (OUTPATIENT)
Dept: OBGYN CLINIC | Facility: CLINIC | Age: 33
End: 2023-12-06
Payer: COMMERCIAL

## 2023-12-06 VITALS
WEIGHT: 269 LBS | HEART RATE: 83 BPM | DIASTOLIC BLOOD PRESSURE: 87 MMHG | RESPIRATION RATE: 17 BRPM | BODY MASS INDEX: 43.23 KG/M2 | HEIGHT: 66 IN | SYSTOLIC BLOOD PRESSURE: 130 MMHG

## 2023-12-06 DIAGNOSIS — S73.191A TEAR OF RIGHT ACETABULAR LABRUM, INITIAL ENCOUNTER: Primary | ICD-10-CM

## 2023-12-06 PROCEDURE — 99213 OFFICE O/P EST LOW 20 MIN: CPT | Performed by: STUDENT IN AN ORGANIZED HEALTH CARE EDUCATION/TRAINING PROGRAM

## 2023-12-06 NOTE — PROGRESS NOTES
Ortho Sports Medicine Hip- Follow-Up Visit    Assesment:   34 y.o. female right hip anterior superior acetabular labral tear, psoas tendonitis    Plan:  The patient's diagnosis and treatment were discussed at length today. We discussed no treatment, non-operative treatment, and operative treatment. Kaleigh Figueroa returns today for follow-up regarding her right hip pain. She has continued pain in the groin despite physical therapy. We discussed the possibility of corticosteroid injection under ultrasound or MSK guidance. We also discussed the possibility of an MR arthrogram to get an improved understanding of extent of acetabular labral tear and/or any cartilage damage. Patient is frustrated with lack of improvement and is interested in MR arthrogram.  I explained this can be both diagnostic and therapeutic. In the meantime she should continue with physical therapy for continue close chain strengthening. She is also interested in meeting with hip arthroscopy surgeon. I did explain to him not sure if she is a candidate given BMI, smoking status however we will send her to Dr. Darren Liu for additional input. Conservative Treatment:     Ice to hip region for 20 minutes at least 1-2 times daily. PT for ROM/strengthening to hip, back, legs and core  OTC NSAIDS prn for pain. Let pain guide gradual return activities. Referral to Dr. Darren Liu for after MRI arthrogram right hip. Imaging: All imaging from today was reviewed by myself and explained to the patient. We will obtain MRI arthrogram of right hip for diagnostic and therapeutic purposes. Injection:      No Injection planned at this time. Surgery:    No surgery is recommended at this point, continue with conservative treatment plan as noted. Follow up:    Continue care with Dr. Darren Liu        Chief Complaint   Patient presents with    Right Hip - Follow-up       History of Present Illness:     The patient is returns for follow up of right hip pain.  She states that she continues to have diffuse right groin pain. She describes her pain as dull and achy that is worse with periods of prolonged sitting as well as bending down to tie her shoes or crossing her legs. She states that she has been compliant with outpatient physical therapy, however she does feel like she has plateaued. She does occasionally get a clicking and snapping sensation felt in the lateral aspect of her hip with certain movements. She denies any new injury or trauma. She denies any numbness or tingling. I have reviewed the past medical, surgical, social and family history, medications and allergies as documented in the EMR. Review of systems: ROS is negative other than that noted in the HPI. Constitutional: Negative for fatigue and fever.        Past Medical, Social and Family History:  Past Medical History:   Diagnosis Date    Anxiety     Constipation     Depression     Diarrhea     GERD (gastroesophageal reflux disease)     Hypothyroidism     IBS (irritable bowel syndrome)     Nausea     Panic attacks     Rash     Varicella      Past Surgical History:   Procedure Laterality Date    WISDOM TOOTH EXTRACTION       Allergies   Allergen Reactions    Other Shortness Of Breath     almonds    Strawberry C [Ascorbate - Food Allergy] GI Intolerance     Current Outpatient Medications on File Prior to Visit   Medication Sig Dispense Refill    Cholecalciferol 1.25 MG (90451 UT) TABS Take 1 tablet (50,000 Units total) by mouth every 7 days x12wks and then take OTC Vit D 4000IU QD (Patient taking differently: Take 5,000 Units by mouth daily od) 12 tablet 0    fluticasone (FLONASE) 50 mcg/act nasal spray 1 spray into each nostril daily 11.1 mL 0    levothyroxine 125 mcg tablet Take 125 mcg by mouth daily        naproxen (NAPROSYN) 500 mg tablet Take 1 tablet (500 mg total) by mouth 2 (two) times a day with meals 28 tablet 0    ferrous sulfate 325 (65 Fe) mg tablet Take by mouth daily with breakfast (Patient not taking: Reported on 2023)      ketoconazole (NIZORAL) 2 % cream Apply topically daily (Patient not taking: Reported on 2023) 15 g 0     No current facility-administered medications on file prior to visit. Social History     Socioeconomic History    Marital status: Single     Spouse name: Not on file    Number of children: Not on file    Years of education: Not on file    Highest education level: Not on file   Occupational History    Not on file   Tobacco Use    Smoking status: Never    Smokeless tobacco: Never   Vaping Use    Vaping Use: Never used   Substance and Sexual Activity    Alcohol use: Not Currently    Drug use: Never    Sexual activity: Never   Other Topics Concern    Not on file   Social History Narrative    Brother  in 2017      Social Determinants of Health     Financial Resource Strain: Not on file   Food Insecurity: Not on file   Transportation Needs: Not on file   Physical Activity: Not on file   Stress: Not on file   Social Connections: Not on file   Intimate Partner Violence: Not on file   Housing Stability: Not on file         I have reviewed the past medical, surgical, social and family history, medications and allergies as documented in the EMR. Review of systems: ROS is negative other than that noted in the HPI. Constitutional: Negative for fatigue and fever. HENT: Negative for sore throat. Respiratory: Negative for shortness of breath. Cardiovascular: Negative for chest pain. Gastrointestinal: Negative for abdominal pain. Endocrine: Negative for cold intolerance and heat intolerance. Genitourinary: Negative for flank pain. Musculoskeletal: Negative for back pain. Skin: Negative for rash. Allergic/Immunologic: Negative for immunocompromised state. Neurological: Negative for dizziness. Psychiatric/Behavioral: Negative for agitation. Physical Exam:    Blood pressure 130/87, pulse 83, resp.  rate 17, height 5' 6" (1.676 m), weight 122 kg (269 lb). General/Constitutional: NAD, well developed, well nourished  HENT: Normocephalic, atraumatic  CV: Intact distal pulses, regular rate  Resp: No respiratory distress or labored breathing  Abd: No abdominal distention  Lymphatic: No lymphadenopathy palpated  Neuro: Alert and Oriented x 3, no focal deficits noted  Psych: Normal mood, normal affect, normal judgement, normal behavior  Skin: Warm, dry, no rashes, no erythema     Hip Exam (focused):    right hip:     No dislocation/deformity  ROM: flexion 110, IR 30, ER 60  Greater troch:tender   IT band: tender  PSIS/piriformis: tender  Purnima test: positive at groin and back  FADDIR: positive  [place]: positive  Abduction: 5/5  AT/GS intact  Pain with resisted knee flexion     Back:    No TTP over lumbar spinous processes, paraspinal musculature  Negative SLR     No calf tenderness to palpation bilaterally    LE NV Exam: +2 DP/PT pulses bilaterally  Sensation intact to light touch L2-S1 bilaterally      Hip Imaging:    X-rays of the right hip were reviewed, which demonstrate no acute osseous abnormality. No significant cam or pincer deformity. I have reviewed the radiology report andagree with their impression. MRI of the right hip were reviewed, which demonstrate anterior superior labral tear. No full-thickness chondral loss. No significant cam or pincer deformity. There is evidence of an ovarian cyst.  I have reviewed the radiology report and agree with their impression.       Scribe Attestation      I,:  Cyndi Michael am acting as a scribe while in the presence of the attending physician.:       I,:  Danish Domínguez, DO personally performed the services described in this documentation    as scribed in my presence.:

## 2023-12-07 ENCOUNTER — OFFICE VISIT (OUTPATIENT)
Dept: PHYSICAL THERAPY | Facility: CLINIC | Age: 33
End: 2023-12-07
Payer: COMMERCIAL

## 2023-12-07 DIAGNOSIS — M25.551 PAIN OF RIGHT HIP: Primary | ICD-10-CM

## 2023-12-07 PROCEDURE — 97110 THERAPEUTIC EXERCISES: CPT

## 2023-12-07 PROCEDURE — 97530 THERAPEUTIC ACTIVITIES: CPT

## 2023-12-07 PROCEDURE — 97112 NEUROMUSCULAR REEDUCATION: CPT

## 2023-12-07 NOTE — PROGRESS NOTES
Daily Note     Today's date: 2023  Patient name: Slime Horvath  : 1990  MRN: [id number]  Referring provider: Dawn Huitron DO  Dx:   Encounter Diagnosis     ICD-10-CM    1. Pain of right hip  M25.551                      Subjective: pt reports to therapy after follow up with surgeon yesterday. Pt states that she is scheduled for an MRI in January and still needs to follow up with a different surgeon. Objective: See treatment diary below      Assessment: Tolerated treatment well. Continued with outlined program to improve strength and progressed as tolerated. Increased resistance on leg press with fatigue noted, however no increased sx's. Increased height for step ups to 8" with minimal sx's. Pt progressing slowly towards her goals. Plan: Continue per plan of care.       Diagnosis: Right hip pain  Precautions: GERD, anxiety/depression  ( * asterisk indicates given per HEP)  Next Physician Appointment:   Doc HEP:     Manuals        STM DK TH             IASTM   TH DK, ant hip TH, ant hip TH ant hip TH ant hip TH ant hip       LE stretching               Joint Mobs                              Neuro Re-Ed               PPT               TA ball press               Supine hip ADD ball sque               Supine BKFO               Wobble Board 2' ea 2' ea 2'ea  2' ea   2' ea       SLS      3x30" ea  Foam 3x30" ea       SL RDL cone tap               Glut squeeze               Quad set               Tandem ambulation on foam f/b               BOSU Step ups               Bridges over SB  20x   20x          SL RDL                Modified planks  On boxes/elbows 20"x3             Quadruped TA (cat)+ alt UE  10x3"  10x ea UE             Ther Ex               Supine Hip Flexor               Butterfly stretch               LTRs no ball               Standing ADD stretch               Seated HS stretch               L/S Ext stretch Stand 2x10 Stand  2x10            Piriformis stretch   10x10"            Standing Hip ABD               Standing Hip Ext                                                            Ther Activity               Bike for LE mobility and endurance Upright 5' Upright  5' Upright  5' Upright  5' Upright  5' Upright  5' Upright  5' Upright  5'       Heel taps               Step Ups 6" with 5# KB carry x10ea       8" with 5# KB  X10 ea       Leg Press    Seat 6   100#  3x10  SL 50#  3x10  100#  3x10  SL 50#  3x10 100#  3x10  SL 50#  3x10 100#  3x10  SL 50#  3x10 110#  3x10  SL 55#  3x10       Mini lunge     Alt on bosu 10xea          TRX Squats     20x   20x       TRX Lateral Lunges        20x ea       Corine Walkouts F/B  20# x10  laps  F/B  20# 10 laps   F/B  20#  10 laps F/B w/ bar  10#  X5 laps ea        Corine Paloff Press 10# 2x10ea 10#  2x10ea    10#   2x10 ea 10#   2x10 [place] sits with press               Lateral step overs               X walks      RTB 2x GTB 2x        STS with KB               Goblet squat to chair  5# 10x2 5# 10x2   5# 10x2         Dirty Baby       5# KB x3 laps                       Pt Ed POC HEP  POC           Re-Evaluation    DK           Modalities               Heat/ice (PRN) Ice 10' Ice 10' Ice 10' Ice 10' Ice 10' Ice 10' Ice 10' Ice 10'       TENS

## 2023-12-12 ENCOUNTER — OFFICE VISIT (OUTPATIENT)
Dept: PHYSICAL THERAPY | Facility: CLINIC | Age: 33
End: 2023-12-12
Payer: COMMERCIAL

## 2023-12-12 DIAGNOSIS — M25.551 PAIN OF RIGHT HIP: Primary | ICD-10-CM

## 2023-12-12 PROCEDURE — 97112 NEUROMUSCULAR REEDUCATION: CPT | Performed by: PHYSICAL THERAPIST

## 2023-12-12 PROCEDURE — 97110 THERAPEUTIC EXERCISES: CPT | Performed by: PHYSICAL THERAPIST

## 2023-12-12 PROCEDURE — 97140 MANUAL THERAPY 1/> REGIONS: CPT | Performed by: PHYSICAL THERAPIST

## 2023-12-12 NOTE — PROGRESS NOTES
Daily Note     Today's date: 2023  Patient name: Opal Guerra  : 1990  MRN: [id number]  Referring provider: Rhonda Patiño DO  Dx:   Encounter Diagnosis     ICD-10-CM    1. Pain of right hip  M25.551           Start Time: 1045  Stop Time: 1130  Total time in clinic (min): 45 minutes    Subjective: Patient reports no changes in pain on arrival.      Objective: See treatment diary below      Assessment: Tolerated treatment well. Patient exhibited good technique with therapeutic exercises. Patient demonstrated improved posture and motor control with LE strengthening exercises. Cuing for TA activation/engagement and posture with Corine walkouts and "Dirty Baby" walks. Patient tolerated exercises well, with good response to manual treatment and ice pack. Plan: Potential discharge next visit.      Diagnosis: Right hip pain  Precautions: GERD, anxiety/depression  ( * asterisk indicates given per HEP)  Next Physician Appointment:   Doc HEP:     Manuals       STM DK TH             IASTM   TH DK, ant hip TH, ant hip TH ant hip TH ant hip TH ant hip DK, ant hip      LE stretching               Joint Mobs                              Neuro Re-Ed               PPT               TA ball press               Supine hip ADD ball sque               Supine BKFO               Wobble Board 2' ea 2' ea 2'ea  2' ea   2' ea       SLS      3x30" ea  Foam 3x30" ea       SL RDL cone tap               Glut squeeze               Quad set               Tandem ambulation on foam f/b               BOSU Step ups               Bridges over SB  20x   20x    20x      SL RDL                Modified planks  On boxes/elbows 20"x3             Quadruped TA (cat)+ alt UE  10x3"  10x ea UE             Ther Ex               Supine Hip Flexor               Butterfly stretch               LTRs no ball               Standing ADD stretch               Seated HS stretch L/S Ext stretch Stand 2x10  Stand  2x10      Stand  2x10      Piriformis stretch   10x10"            Standing Hip ABD               Standing Hip Ext                                                            Ther Activity               Bike for LE mobility and endurance Upright 5' Upright  5' Upright  5' Upright  5' Upright  5' Upright  5' Upright  5' Upright  5' Upright  5'      Heel taps               Step Ups 6" with 5# KB carry x10ea       8" with 5# KB  X10 ea       Leg Press    Seat 6   100#  3x10  SL 50#  3x10  100#  3x10  SL 50#  3x10 100#  3x10  SL 50#  3x10 100#  3x10  SL 50#  3x10 110#  3x10  SL 55#  3x10 110#  3x10  SL 55#  3x10      Mini lunge     Alt on bosu 10xea          TRX Squats     20x   20x       TRX Lateral Lunges        20x ea       Corine Walkouts F/B  20# x10  laps  F/B  20# 10 laps   F/B  20#  10 laps F/B w/ bar  10#  X5 laps ea  F/B w/ bar  10#  X5 laps ea      [place] Paloff Press 10# 2x10ea 10#  2x10ea    10#   2x10 ea 10#   2x10 Mardene Bottoms sits with press               Lateral step overs               X walks      RTB 2x GTB 2x  GTB 3x      STS with KB               Goblet squat to chair  5# 10x2 5# 10x2   5# 10x2         Dirty Baby       5# KB x3 laps  10# KB x2 laps                     Pt Ed POC HEP  POC           Re-Evaluation    DK           Modalities               Heat/ice (PRN) Ice 10' Ice 10' Ice 10' Ice 10' Ice 10' Ice 10' Ice 10' Ice 10' Ice 10'      TENS

## 2023-12-13 ENCOUNTER — ANNUAL EXAM (OUTPATIENT)
Dept: OBGYN CLINIC | Facility: CLINIC | Age: 33
End: 2023-12-13
Payer: COMMERCIAL

## 2023-12-13 VITALS
WEIGHT: 265 LBS | HEIGHT: 66 IN | BODY MASS INDEX: 42.59 KG/M2 | DIASTOLIC BLOOD PRESSURE: 74 MMHG | SYSTOLIC BLOOD PRESSURE: 128 MMHG

## 2023-12-13 DIAGNOSIS — N83.8 PARATUBAL CYST: ICD-10-CM

## 2023-12-13 DIAGNOSIS — Z01.419 WOMEN'S ANNUAL ROUTINE GYNECOLOGICAL EXAMINATION: Primary | ICD-10-CM

## 2023-12-13 PROCEDURE — 99395 PREV VISIT EST AGE 18-39: CPT | Performed by: OBSTETRICS & GYNECOLOGY
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
## 2023-12-13 NOTE — PATIENT INSTRUCTIONS
Please call [phone] to schedule your ultrasound(s). [place] [facility] [place] [id number] [facility]

## 2023-12-14 ENCOUNTER — OFFICE VISIT (OUTPATIENT)
Dept: PHYSICAL THERAPY | Facility: CLINIC | Age: 33
End: 2023-12-14
Payer: COMMERCIAL

## 2023-12-14 DIAGNOSIS — M25.551 PAIN OF RIGHT HIP: Primary | ICD-10-CM

## 2023-12-14 PROCEDURE — 97530 THERAPEUTIC ACTIVITIES: CPT

## 2023-12-14 PROCEDURE — 97110 THERAPEUTIC EXERCISES: CPT

## 2023-12-14 PROCEDURE — 97112 NEUROMUSCULAR REEDUCATION: CPT

## 2023-12-14 NOTE — PROGRESS NOTES
Discharge    Today's date: 2023  Patient name: Marlon Friend  : 1990  MRN: [id number]  Referring provider: Dalton Ugarte DO  Dx:   Encounter Diagnosis     ICD-10-CM    1. Pain of right hip  M25.551                      Subjective: Pt states that she feels comfortable with  being discharged today and doing things on her own even though she continues with some discomfort. Pt is hopeful that the MRI in January is helpful. Objective: See treatment diary below      Assessment: Tolerated treatment well. Focused on cores strengthening and reviewing ex's for pt's HEP. Pt reports and demonstrates good understanding of her ex's. Questions/concerns discussed. Pt will be discharged to her HEP at this time.         Plan: Discharge     Diagnosis: Right hip pain  Precautions: GERD, anxiety/depression  ( * asterisk indicates given per HEP)  Next Physician Appointment:   Doc HEP:     Manuals    STM DK TH           IASTM   TH DK, ant hip TH, ant hip TH ant hip TH ant hip TH ant hip DK, ant hip TH, ant hip                LE stretching             Joint Mobs                          Neuro Re-Ed             PPT             TA ball press             Supine hip ADD ball sque             Supine BKFO             Wobble Board 2' ea 2' ea 2'ea  2' ea   2' ea  2' ea   SLS      3x30" ea  Foam 3x30" ea  Foam  3x30" ea   SL RDL cone tap             Glut squeeze             Quad set             Tandem ambulation on foam f/b             BOSU Step ups             Bridges over SB  20x   20x    20x    SL RDL              Modified planks  On boxes/elbows 20"x3           Quadruped TA (cat)+ alt UE  10x3"  10x ea UE           Ther Ex             Supine Hip Flexor             Butterfly stretch             LTRs no ball             Standing ADD stretch             Seated HS stretch             L/S Ext stretch Stand 2x10  Stand  2x10      Stand  2x10 Piriformis stretch   10x10"          Standing Hip ABD             Standing Hip Ext                                                    Ther Activity             Bike for LE mobility and endurance Upright 5' Upright  5' Upright  5' Upright  5' Upright  5' Upright  5' Upright  5' Upright  5' Upright  5' Upright  5'     Heel taps             Step Ups 6" with 5# KB carry x10ea       8" with 5# KB  X10 ea     Leg Press    Seat 6   100#  3x10  SL 50#  3x10  100#  3x10  SL 50#  3x10 100#  3x10  SL 50#  3x10 100#  3x10  SL 50#  3x10 110#  3x10  SL 55#  3x10 110#  3x10  SL 55#  3x10 110#  3x10  SL 55#  3x10   Mini lunge     Alt on bosu 10xea        TRX Squats     20x   20x     TRX Lateral Lunges        20x ea     Corine Walkouts F/B  20# x10  laps  F/B  20# 10 laps   F/B  20#  10 laps F/B w/ bar  10#  X5 laps ea  F/B w/ bar  10#  X5 laps ea F/B w/bar  10#  X5 laps ea   Desdemona Paloff Press 10# 2x10ea 10#  2x10ea    10#   2x10 ea 10#   2x10 Radha Fraga sits with press             Lateral step overs             X walks      RTB 2x GTB 2x  GTB 3x    STS with KB             Goblet squat to chair  5# 10x2 5# 10x2   5# 10x2       Dirty Baby       5# KB x3 laps  10# KB x2 laps 10# Kbx2 laps                Pt Ed POC HEP  POC      HEP   Re-Evaluation    DK         Modalities             Heat/ice (PRN) Ice 10' Ice 10' Ice 10' Ice 10' Ice 10' Ice 10' Ice 10' Ice 10' Ice 10' Ice 10'   TENS

## 2024-01-08 ENCOUNTER — HOSPITAL ENCOUNTER (OUTPATIENT)
Dept: RADIOLOGY | Facility: HOSPITAL | Age: 34
Discharge: HOME | End: 2024-01-08
Attending: STUDENT IN AN ORGANIZED HEALTH CARE EDUCATION/TRAINING PROGRAM

## 2024-01-24 ENCOUNTER — HOSPITAL ENCOUNTER (OUTPATIENT)
Dept: ULTRASOUND IMAGING | Facility: HOSPITAL | Age: 34
Discharge: HOME | End: 2024-01-24
Attending: OBSTETRICS & GYNECOLOGY
Payer: COMMERCIAL

## 2024-01-24 DIAGNOSIS — N83.8 PARATUBAL CYST: ICD-10-CM

## 2024-01-24 PROCEDURE — 76856 US EXAM PELVIC COMPLETE: CPT

## 2024-01-24 PROCEDURE — 76830 TRANSVAGINAL US NON-OB: CPT

## 2024-02-06 ENCOUNTER — TELEPHONE (OUTPATIENT)
Dept: HEMATOLOGY ONCOLOGY | Facility: CLINIC | Age: 34
End: 2024-02-06

## 2024-02-06 NOTE — TELEPHONE ENCOUNTER
LEFT MESSAGE FOR PT INFORMING APPT WAS R/S DUE TO PROVIDER BEING OOO, LEFT CALL BACK NUMBER WITH ANY QUESTIONS.

## 2024-04-06 LAB
ALBUMIN SERPL-MCNC: 4.3 G/DL (ref 3.6–5.1)
ALBUMIN/GLOB SERPL: 1.6 (CALC) (ref 1–2.5)
ALP SERPL-CCNC: 80 U/L (ref 31–125)
ALT SERPL-CCNC: 8 U/L (ref 6–29)
AST SERPL-CCNC: 11 U/L (ref 10–30)
BASOPHILS # BLD AUTO: 89 CELLS/UL (ref 0–200)
BASOPHILS NFR BLD AUTO: 1.5 %
BILIRUB SERPL-MCNC: 0.6 MG/DL (ref 0.2–1.2)
BUN SERPL-MCNC: 7 MG/DL (ref 7–25)
BUN/CREAT SERPL: NORMAL (CALC) (ref 6–22)
CALCIUM SERPL-MCNC: 9.4 MG/DL (ref 8.6–10.2)
CHLORIDE SERPL-SCNC: 105 MMOL/L (ref 98–110)
CO2 SERPL-SCNC: 27 MMOL/L (ref 20–32)
CREAT SERPL-MCNC: 0.7 MG/DL (ref 0.5–0.97)
EOSINOPHIL # BLD AUTO: 159 CELLS/UL (ref 15–500)
EOSINOPHIL NFR BLD AUTO: 2.7 %
ERYTHROCYTE [DISTWIDTH] IN BLOOD BY AUTOMATED COUNT: 12.4 % (ref 11–15)
FERRITIN SERPL-MCNC: 18 NG/ML (ref 16–154)
FOLATE SERPL-MCNC: 8.4 NG/ML
GFR/BSA.PRED SERPLBLD CYS-BASED-ARV: 118 ML/MIN/1.73M2
GLOBULIN SER CALC-MCNC: 2.7 G/DL (CALC) (ref 1.9–3.7)
GLUCOSE SERPL-MCNC: 89 MG/DL (ref 65–99)
HCT VFR BLD AUTO: 39.4 % (ref 35–45)
HGB BLD-MCNC: 13.1 G/DL (ref 11.7–15.5)
IRON SATN MFR SERPL: 33 % (CALC) (ref 16–45)
IRON SERPL-MCNC: 85 MCG/DL (ref 40–190)
LYMPHOCYTES # BLD AUTO: 2070.9 CELLS/UL (ref 850–3900)
LYMPHOCYTES NFR BLD AUTO: 35.1 %
MCH RBC QN AUTO: 30.3 PG (ref 27–33)
MCHC RBC AUTO-ENTMCNC: 33.2 G/DL (ref 32–36)
MCV RBC AUTO: 91.2 FL (ref 80–100)
METHYLMALONATE SERPL-SCNC: 115 NMOL/L (ref 87–318)
MONOCYTES # BLD AUTO: 472 CELLS/UL (ref 200–950)
MONOCYTES NFR BLD AUTO: 8 %
NEUTROPHILS # BLD AUTO: 3109 CELLS/UL (ref 1500–7800)
NEUTROPHILS NFR BLD AUTO: 52.7 %
PLATELET # BLD AUTO: 302 THOUSAND/UL (ref 140–400)
PMV BLD REES-ECKER: 11.6 FL (ref 7.5–12.5)
POTASSIUM SERPL-SCNC: 4.5 MMOL/L (ref 3.5–5.3)
PROT SERPL-MCNC: 7 G/DL (ref 6.1–8.1)
RBC # BLD AUTO: 4.32 MILLION/UL (ref 3.8–5.1)
SODIUM SERPL-SCNC: 137 MMOL/L (ref 135–146)
TIBC SERPL-MCNC: 254 MCG/DL (CALC) (ref 250–450)
VIT B12 SERPL-MCNC: 424 PG/ML (ref 200–1100)
WBC # BLD AUTO: 5.9 THOUSAND/UL (ref 3.8–10.8)

## 2024-04-08 ENCOUNTER — TELEPHONE (OUTPATIENT)
Dept: OBGYN CLINIC | Facility: CLINIC | Age: 34
End: 2024-04-08

## 2024-04-08 ENCOUNTER — TELEPHONE (OUTPATIENT)
Age: 34
End: 2024-04-08

## 2024-04-08 DIAGNOSIS — N83.8 PARATUBAL CYST: ICD-10-CM

## 2024-04-08 DIAGNOSIS — N94.9 ADNEXAL CYST: Primary | ICD-10-CM

## 2024-04-08 NOTE — TELEPHONE ENCOUNTER
Patient called and stated she had spoke to the dr about surgery for a cyst she has, patient stated she is having more symptoms (pain, and pressure in that area) and would like to move forward, escalating to the office guidance, not sure to schedule surgery or come in for an office visit with the provider. Please call patient to discus. Thank you.

## 2024-04-08 NOTE — TELEPHONE ENCOUNTER
Repeat US ordered,   Can scheduled out for diagnostic lap, L removal of ovarian cyst   Will need pre-op visit.

## 2024-04-08 NOTE — TELEPHONE ENCOUNTER
Called and spoke with pt.   Pt now scheduled for Diag. Lap. With Left Cystectomy on Mon. 05/06/24 @ [facility] in the morning.     Pre op appt 4/22/24   Pt aware she needs to call central scheduling to schedule repeat US in the next 1 to 2 wks.     Dr ALVARADO, please send over scheduling sheet when able and I will finish the process  Thanks!

## 2024-04-08 NOTE — TELEPHONE ENCOUNTER
[facility] OB GYN Department  Surgery Scheduling Sheet    Patient Name: Nadine Lacey  : 1990    Provider: Saritha Hoyos DO     Needed: no; Language: N/A    Procedure: exam under anesthesia, diagnostic laparoscopy, and cystectomy, possible LSO    Diagnosis: adnexal cyst    Special Needs or Equipment: none    Anesthesia: General anesthesia    Length of stay: outpatient  Does patient have comorbid conditions that will require close perioperative monitoring prior to safe discharge: no    The patient has comorbid conditions that will require close perioperative monitoring prior to safe discharge, including N/A.   This may require acute care beyond the usual and routine recovery period. As such, inpatient admission post-operatively is expected and appropriate, and anticipated hospital length of stay will be >2 midnights.    Pre-Admission Testing Needed: yes   Labs that should be ordered: cbc    Order PAT that is recommended in prep for procedure?: not yet    Medical Clearance Needed: no; Provider: N/A    MA Form Signed (tubals/hysterectomy): Not Indicated    Surgical Drink Given: no     How many days out of work: 7 day(s)     How many days no drivin day(s)       Is pre op appt needed?  yes  Interval for post op appt: 2 week(s)     For Surgical Scheduler:     Surgery Scheduled On:  [place]: [facility], [facility], and [facility]    Pre-op Appt:   Post op Appt:  Consult/Medical clearance appt:

## 2024-04-08 NOTE — TELEPHONE ENCOUNTER
DO Eduarda Alvarado MA    [facility] OB GYN Department  Surgery Scheduling Sheet    Patient Name: Nadine Lacey  : 1990    Provider: Saritha Hoyos DO     Needed: no; Language: N/A    Procedure: exam under anesthesia, diagnostic laparoscopy, and left cystectomy, possible LSO    Diagnosis: adnexal cyst    Special Needs or Equipment: none    Anesthesia: General anesthesia    Length of stay: outpatient  -Does patient have comorbid conditions that will require close perioperative monitoring prior to safe discharge: no    -The patient has comorbid conditions that will require close perioperative monitoring prior to safe discharge, including N/A.  -This may require acute care beyond the usual and routine recovery period. As such, inpatient admission post-operatively is expected and appropriate, and anticipated hospital length of stay will be >2 midnights.    Pre-Admission Testing Needed: yes  Labs that should be ordered: cbc    Order PAT that is recommended in prep for procedure?: not indicated    Medical Clearance Needed: no; Provider: N/A    MA Form Signed (tubals/hysterectomy): Not Indicated    Surgical Drink Given: no    How many days out of work: 7 day(s)    How many days no drivin day(s)      Is pre op appt needed?  yes  Interval for post op appt: 2 week(s)    For Surgical Scheduler:    Surgery Scheduled On: MON. 24-AFTERNOON   [place]: [facility]    Pre-op Appt: 24  Post op Appt: 24  Consult/Medical clearance appt: N/A

## 2024-04-10 ENCOUNTER — TELEPHONE (OUTPATIENT)
Dept: HEMATOLOGY ONCOLOGY | Facility: CLINIC | Age: 34
End: 2024-04-10

## 2024-04-10 ENCOUNTER — OFFICE VISIT (OUTPATIENT)
Dept: HEMATOLOGY ONCOLOGY | Facility: CLINIC | Age: 34
End: 2024-04-10
Payer: COMMERCIAL

## 2024-04-10 VITALS
RESPIRATION RATE: 15 BRPM | BODY MASS INDEX: 43.71 KG/M2 | OXYGEN SATURATION: 99 % | WEIGHT: 272 LBS | SYSTOLIC BLOOD PRESSURE: 122 MMHG | HEIGHT: 66 IN | TEMPERATURE: 97.3 F | DIASTOLIC BLOOD PRESSURE: 80 MMHG | HEART RATE: 99 BPM

## 2024-04-10 DIAGNOSIS — R79.0 LOW FERRITIN: Primary | ICD-10-CM

## 2024-04-10 PROCEDURE — 99214 OFFICE O/P EST MOD 30 MIN: CPT

## 2024-04-10 RX ORDER — LEVOTHYROXINE SODIUM 137 MCG
137 TABLET ORAL DAILY
COMMUNITY
Start: 2024-03-12

## 2024-04-10 RX ORDER — SODIUM CHLORIDE 9 MG/ML
20 INJECTION, SOLUTION INTRAVENOUS ONCE
OUTPATIENT
Start: 2024-04-24

## 2024-04-10 NOTE — PROGRESS NOTES
[address] [facility] [place]  [address] [facility] [id number]  Hematology Ambulatory Follow-Up  Nadine Lacey, 1990, [id number]  4/10/2024    Assessment/Plan:  1. Low ferritin  Ms. Lacey is a 34-year-old female seen in follow-up for low ferritin.  She previously had a ferritin of 10 with no anemia but was symptomatic treated with IV iron.  After receiving IV iron her symptoms resolved.  Her most recent ferritin has been downtrending and she trialed oral iron, Slow Fe, which caused her significant nausea and she does not take this consistently due to the side effects.  We discussed that since her symptoms return after her ferritin is less than 50 I suggest another course of IV iron.  She will receive IV Venofer 200 mg x 3 doses.  She will then repeat labs in 4 months.  If at that time her ferritin is below 50 I suggested starting maintenance IV iron every 4 to 6 months pending labs and symptoms.  She knows to call the office if her symptoms return for sooner evaluation prior to her follow-up.  I discussed with the patient and her mother that her iron deficiency is likely due to a malabsorption condition as she does not have excess blood loss.  She does have IBS but is fairly well-controlled.  Since there has not been any anemia present I do not believe further workup with gastroenterology is warranted at this time.  If for some reason her iron does not respond to this course of IV iron further investigation with GI may be recommended.    - CBC and differential; Future  - Comprehensive metabolic panel; Future  - Folate; Future  - Vitamin B12; Future  - Methylmalonic acid, serum; Future  - Iron Panel (Includes Ferritin, Iron Sat%, Iron, and TIBC); Future      The patient is scheduled for follow-up in approximately 4 months.   Patient voiced agreement and understanding to the above. Patient knows to call the Hematology/Oncology office with any questions  and concerns regarding the above.      Barrier(s) to care: None  The patient is able to self care.  ------------------------------------------------------------------------------------------------------    Chief Complaint   Patient presents with    Follow-up       History of present illness:   Nadine Lacey is a 34-year-old female seen in follow-up for low ferritin.  She has no source of chronic blood loss but does have a diagnosis of IBS which could be contributing with the malabsorption component.  She has symptoms that could also be associated due to her thyroid condition and has had a difficult time regulating her Synthroid dosage.  Her hemoglobin has remained within normal limits and no signs of anemia.        04/13/2018:  Hemoglobin 13.1, MCV 96, platelets 271, WBC 4.8  04/17/2019:  Hemoglobin 13 7, MCV 92.6, platelets 273, WBC 4.6  04/28/2020:  Hemoglobin 13.3, MCV 93.5, platelets 266, WBC 5.7  08/16/2021:  Hemoglobin 12.9, MCV 91.7, platelets 254, WBC 4.9  05/16/2022: Ferritin 11, iron saturation 35%, serum iron 95, TIBC 273, vitamin B12 418  07/18/2022:  Hemoglobin 12.8, MCV 90, platelets 272, WBC 4.6              Ferritin 66, iron saturation 76%, TIBC 269, serum iron 198                08/20/2022:  Hemoglobin 12.7, MCV 93.3, platelets 273, WBC 6.7              Ferritin 20, iron saturation 29%, TIBC 288, serum iron 83  9/23/2022: Hemoglobin 13.2, MCV 89.6, platelets 296, WBC 6.1              Ferritin 10, iron saturation 30%, TIBC 293, serum iron 88  IV Venofer 200 mg x6: 11/7-12/13 12/14/2022: Hemoglobin 13.3, MCV 92.6, platelets 305, WBC 6              Ferritin 191, iron saturation 75%, TIBC 259, serum iron 190  7/6/2023: Hemoglobin 14, MCV 91.9, platelets 290, WBC 6              Ferritin 43, iron saturation 43%, TIBC 264, serum iron 113  4/3/2024: Hemoglobin 13.1, MCV 91.2, platelets 302, WBC 5.9   Ferritin 18, iron saturation 3%, TIBC 254, serum iron 85   B12 424, folate 8.4    Interval history:  She trialed Slow Fe and it caused her some nausea.  She does take this occasionally off and on but not consistently due to the side effects.  She is scheduled to have a tubal cyst removed with OB/GYN in May.  She has not had any changes in her menstrual cycle or increased bleeding.  She noted about 1 to 2 months ago the symptoms of iron deficiency returning with fatigue, dizziness, and heart palpitations.     Review of Systems   Constitutional:  Positive for fatigue (last 1-2 mopnths). Negative for activity change, appetite change, diaphoresis, fever and unexpected weight change.   HENT:  Negative for trouble swallowing and voice change.    Eyes:  Negative for photophobia and visual disturbance.   Respiratory:  Negative for cough, chest tightness and shortness of breath.    Cardiovascular:  Positive for palpitations.   Gastrointestinal:  Negative for abdominal distention, abdominal pain, blood in stool, constipation, diarrhea, nausea and vomiting.   Endocrine: Negative for cold intolerance and heat intolerance.   Genitourinary:  Negative for dysuria, hematuria, menstrual problem and urgency.   Musculoskeletal:  Negative for arthralgias, back pain, gait problem and joint swelling.   Skin:  Negative for pallor and rash.   Neurological:  Positive for dizziness. Negative for tremors, weakness, light-headedness and headaches.   Hematological:  Negative for adenopathy. Does not bruise/bleed easily.   Psychiatric/Behavioral:  Negative for confusion and sleep disturbance.        Patient Active Problem List   Diagnosis    Other malaise and fatigue    Class 2 obesity with body mass index (BMI) of 36.0 to 36.9 in adult    IBS (irritable bowel syndrome)    Hypothyroidism    Anxiety    Atypical mole    Acute pain of left shoulder    Low ferritin    Iron deficiency anemia, unspecified       Past Medical History:   Diagnosis Date    Anxiety     Constipation     Depression     Diarrhea     GERD (gastroesophageal reflux disease)      Hypothyroidism     IBS (irritable bowel syndrome)     Nausea     Panic attacks     Rash     Varicella        Past Surgical History:   Procedure Laterality Date    WISDOM TOOTH EXTRACTION         Family History   Problem Relation Age of Onset    Hypothyroidism Mother     Allergies Mother     Thyroid disease Mother     Diabetes Father     Hyperlipidemia Father     Hip dysplasia Sister     Seizures Sister     Depression Brother     Substance Abuse Brother         heroin    Stroke Paternal Grandmother     Skin cancer Paternal Grandfather     Prostate cancer Paternal Grandfather     Lung cancer Paternal Grandfather     Cancer Paternal Grandfather         Skin, Prostate, Brain,Lung    Osteoporosis Paternal Grandfather     Personality disorder Sister     Breast cancer Neg Hx     Colon cancer Neg Hx     Ovarian cancer Neg Hx     Uterine cancer Neg Hx     Cervical cancer Neg Hx        Social History     Socioeconomic History    Marital status: Single     Spouse name: None    Number of children: None    Years of education: None    Highest education level: None   Occupational History    None   Tobacco Use    Smoking status: Never    Smokeless tobacco: Never   Vaping Use    Vaping status: Never Used   Substance and Sexual Activity    Alcohol use: Not Currently    Drug use: Never    Sexual activity: Never   Other Topics Concern    None   Social History Narrative    Brother  in 2017      Social Determinants of Health     Financial Resource Strain: Not on file   Food Insecurity: Not on file   Transportation Needs: Not on file   Physical Activity: Not on file   Stress: Not on file   Social Connections: Not on file   Intimate Partner Violence: Not on file   Housing Stability: Not on file         Current Outpatient Medications:     Cholecalciferol 1.25 MG (91914 UT) TABS, Take 1 tablet (50,000 Units total) by mouth every 7 days x12wks and then take OTC Vit D 4000IU QD (Patient taking differently: Take 5,000 Units by  "mouth daily od), Disp: 12 tablet, Rfl: 0    ferrous sulfate 325 (65 Fe) mg tablet, Take by mouth daily with breakfast, Disp: , Rfl:     fluticasone (FLONASE) 50 mcg/act nasal spray, 1 spray into each nostril daily, Disp: 11.1 mL, Rfl: 0    naproxen (NAPROSYN) 500 mg tablet, Take 1 tablet (500 mg total) by mouth 2 (two) times a day with meals, Disp: 28 tablet, Rfl: 0    Synthroid 137 MCG tablet, Take 137 mcg by mouth daily, Disp: , Rfl:     levothyroxine 125 mcg tablet, Take 125 mcg by mouth daily   (Patient not taking: Reported on 4/10/2024), Disp: , Rfl:     Allergies   Allergen Reactions    Other Shortness Of Breath     almonds    Strawberry C [Ascorbate - Food Allergy] GI Intolerance       Objective:  /80 (BP Location: Left arm, Patient Position: Sitting, Cuff Size: Large)   Pulse 99   Temp (!) 97.3 °F (36.3 °C) (Temporal)   Resp 15   Ht 5' 6\" (1.676 m)   Wt 123 kg (272 lb)   SpO2 99%   BMI 43.90 kg/m²    Physical Exam  Constitutional:       General: She is not in acute distress.     Appearance: Normal appearance. She is not ill-appearing.   HENT:      Head: Normocephalic and atraumatic.   Eyes:      Extraocular Movements: Extraocular movements intact.      Conjunctiva/sclera: Conjunctivae normal.   Cardiovascular:      Rate and Rhythm: Normal rate and regular rhythm.      Pulses: Normal pulses.      Heart sounds: Normal heart sounds. No murmur heard.  Pulmonary:      Effort: Pulmonary effort is normal.      Breath sounds: Normal breath sounds. No wheezing.   Abdominal:      General: Bowel sounds are normal. There is no distension.      Palpations: Abdomen is soft.      Tenderness: There is no abdominal tenderness.   Musculoskeletal:      Cervical back: Normal range of motion. No tenderness.      Right lower leg: No edema.      Left lower leg: No edema.   Lymphadenopathy:      Cervical: No cervical adenopathy.   Skin:     General: Skin is warm and dry.      Capillary Refill: Capillary refill takes " less than 2 seconds.      Coloration: Skin is not pale.      Findings: No bruising or lesion.   Neurological:      General: No focal deficit present.      Mental Status: She is alert and oriented to person, place, and time. Mental status is at baseline.      Motor: No weakness.      Gait: Gait normal.   Psychiatric:         Mood and Affect: Mood normal.         Behavior: Behavior normal.         Thought Content: Thought content normal.         Judgment: Judgment normal.         Result Review  Labs:  Orders Only on 04/03/2024   Component Date Value Ref Range Status    Iron, Serum 04/03/2024 85  40 - 190 mcg/dL Final    Total Iron Binding Capacity (TIBC) 04/03/2024 254  250 - 450 mcg/dL (calc) Final    Iron Saturation 04/03/2024 33  16 - 45 % (calc) Final    Glucose, Random 04/03/2024 89  65 - 99 mg/dL Final    Comment:               Fasting reference interval         BUN 04/03/2024 7  7 - 25 mg/dL Final    Creatinine 04/03/2024 0.70  0.50 - 0.97 mg/dL Final    eGFR 04/03/2024 118  > OR = 60 mL/min/1.73m2 Final    SL AMB BUN/CREATININE RATIO 04/03/2024 SEE NOTE:  6 - 22 (calc) Final    Comment:    Not Reported: BUN and Creatinine are within     reference range.            Sodium 04/03/2024 137  135 - 146 mmol/L Final    Potassium 04/03/2024 4.5  3.5 - 5.3 mmol/L Final    Chloride 04/03/2024 105  98 - 110 mmol/L Final    CO2 04/03/2024 27  20 - 32 mmol/L Final    Calcium 04/03/2024 9.4  8.6 - 10.2 mg/dL Final    Protein, Total 04/03/2024 7.0  6.1 - 8.1 g/dL Final    Albumin 04/03/2024 4.3  3.6 - 5.1 g/dL Final    Globulin 04/03/2024 2.7  1.9 - 3.7 g/dL (calc) Final    Albumin/Globulin Ratio 04/03/2024 1.6  1.0 - 2.5 (calc) Final    TOTAL BILIRUBIN 04/03/2024 0.6  0.2 - 1.2 mg/dL Final    Alkaline Phosphatase 04/03/2024 80  31 - 125 U/L Final    AST 04/03/2024 11  10 - 30 U/L Final    ALT 04/03/2024 8  6 - 29 U/L Final    Methylmalonic Acid, S 04/03/2024 115  87 - 318 nmol/L Final    Comment: This test was developed and  its analytical performance  characteristics have been determined by [employer] [place]. It has  not been cleared or approved by the U.S. Food and Drug  Administration. This assay has been validated pursuant  to the CLIA regulations and is used for clinical  purposes.             White Blood Cell Count 04/03/2024 5.9  3.8 - 10.8 Thousand/uL Final    Red Blood Cell Count 04/03/2024 4.32  3.80 - 5.10 Million/uL Final    Hemoglobin 04/03/2024 13.1  11.7 - 15.5 g/dL Final    HCT 04/03/2024 39.4  35.0 - 45.0 % Final    MCV 04/03/2024 91.2  80.0 - 100.0 fL Final    MCH 04/03/2024 30.3  27.0 - 33.0 pg Final    MCHC 04/03/2024 33.2  32.0 - 36.0 g/dL Final    RDW 04/03/2024 12.4  11.0 - 15.0 % Final    Platelet Count 04/03/2024 302  140 - 400 Thousand/uL Final    SL AMB MPV 04/03/2024 11.6  7.5 - 12.5 fL Final    Neutrophils (Absolute) 04/03/2024 3,109  1,500 - 7,800 cells/uL Final    Lymphocytes (Absolute) 04/03/2024 2,070.9  850 - 3,900 cells/uL Final    Monocytes (Absolute) 04/03/2024 472  200 - 950 cells/uL Final    Eosinophils (Absolute) 04/03/2024 159  15 - 500 cells/uL Final    Basophils ABS 04/03/2024 89  0 - 200 cells/uL Final    Neutrophils 04/03/2024 52.7  % Final    Lymphocytes 04/03/2024 35.1  % Final    Monocytes 04/03/2024 8.0  % Final    Eosinophils 04/03/2024 2.7  % Final    Basophils PCT 04/03/2024 1.5  % Final    Ferritin 04/03/2024 18  16 - 154 ng/mL Final    FOLATE, SERUM 04/03/2024 8.4  ng/mL Final    Comment:                            Reference Range                             Low:           <3.4                             Borderline:    3.4-5.4                             Normal:        >5.4         Vitamin B-12 04/03/2024 424  200 - 1,100 pg/mL Final       Imaging:   No relevant imaging to review     Please note:  This report has been generated by a voice recognition software system. Therefore there may be syntax, spelling, and/or grammatical errors. Please call if  you have any questions.

## 2024-04-14 ENCOUNTER — HOSPITAL ENCOUNTER (OUTPATIENT)
Dept: ULTRASOUND IMAGING | Facility: HOSPITAL | Age: 34
Discharge: HOME | End: 2024-04-14
Attending: OBSTETRICS & GYNECOLOGY
Payer: COMMERCIAL

## 2024-04-14 DIAGNOSIS — N94.9 ADNEXAL CYST: ICD-10-CM

## 2024-04-14 PROCEDURE — 76856 US EXAM PELVIC COMPLETE: CPT

## 2024-04-14 PROCEDURE — 76830 TRANSVAGINAL US NON-OB: CPT

## 2024-04-22 ENCOUNTER — CONSULT (OUTPATIENT)
Dept: OBGYN CLINIC | Facility: CLINIC | Age: 34
End: 2024-04-22
Payer: COMMERCIAL

## 2024-04-22 VITALS
BODY MASS INDEX: 43.71 KG/M2 | WEIGHT: 272 LBS | DIASTOLIC BLOOD PRESSURE: 84 MMHG | HEIGHT: 66 IN | SYSTOLIC BLOOD PRESSURE: 120 MMHG

## 2024-04-22 DIAGNOSIS — N84.0 ENDOMETRIAL POLYP: ICD-10-CM

## 2024-04-22 DIAGNOSIS — R10.2 PELVIC PAIN: ICD-10-CM

## 2024-04-22 DIAGNOSIS — Z01.818 PRE-OP EXAMINATION: Primary | ICD-10-CM

## 2024-04-22 DIAGNOSIS — N94.89 ADNEXAL MASS: ICD-10-CM

## 2024-04-22 PROCEDURE — 99214 OFFICE O/P EST MOD 30 MIN: CPT | Performed by: OBSTETRICS & GYNECOLOGY

## 2024-04-22 NOTE — PATIENT INSTRUCTIONS
Pre-Operative Showing Instructions    Before your operation, you play an important role in decreasing your risk for infection.  Washing your body thoroughly in order to reduce bacteria on the skin can help to prevent infections at the surgical site.    Please read the following directions the week before your operation so you are ready!    Week Before Surgery  If your surgeon instructed to use a special antiseptic soap containing chlorhexidine gluconate (CHG) prior to surgery, you should obtain this soap at least 1 week before your operation. Common brand names include:  Aplicare (R)  Endure  Hibiclens (R)    CHG soap is available from some doctor's offices, [facility], or most retail pharmacies.  If you are allergic or sensitive to CHG, please let your doctor know so other antiseptic soap can be suggested.    If you are unable to purchase so containing CHG, then use regular soap as instructed below.    Day Before Surgery  You will need to shower the night before AND the morning of your surgery.  You will need to prepare your bed and clothing so they are clean and ready after your showers.    Place clean linens (sheets) on your bed; you should sleep on clean sheets after your evening shower.  Get clean towels and washcloths ready -- you will need enough for 2 showers.  Set aside clean underwear, pajamas, and clothing to wear after your showers.     Evening Before Surgery    The evening before your operation, take your first shower (Shower 1).    Shower 1:  First, shampoo your hair with regular shampoo and rinse it compeltely before you wash your body.  Next, wash your body from head to toe with soap and a clean washcloth.  If you were instructed by your surgeon to was with CHG soap:  Use 1/2 of the bottle of soap for this shower (you will use the other 1/2 of the bottle for your shower in the morning).  Do not get CHG in your eyes, ears, nose, mouth, or vaginal area.  If you are using regular  soap, try to use a new bar if possible.  Pay special attention to the area where your incision will be; lather this area well with the CHG soap for about 2 minutes.  DO NOT use any other soap or body rinse on your skin during or after the antiseptic soap.  Rinse yourself completely with running water.  Use a clean towel to dry off.  Put on clean underwear and clothing/pajamas.  Sleep on clean bed sheets/linens.    Reminders:  DO NOT use lotion, powder, deodorant, or perfume/aftershave of any kind on your skin after your antiseptic shower.  DO NOT shave any body parts in the 24 hours/the day before your operation.    Day of Surgery    The morning of your operation, take your second shower (Shower 2).    Shower 2:  Follow the same steps as Shower 1.  If you were instructed by your surgeon to wash with the CHG soap, use the second 1/2 of the bottle of CHG soap.

## 2024-04-22 NOTE — PROGRESS NOTES
Assessment Nadine was seen today for pre-op exam.    Diagnoses and all orders for this visit:    Pre-op examination    Adnexal mass    Pelvic pain    Endometrial polyp         Plan    Nadine Lacey is scheduled for exam under anesthesia, diagnostic laparoscopy, dilation and curettage , hysteroscopy, resection of uterine pathology, and adnexal cystectomy  on . Pre-op instructions, including showering with Hibiclens, discussed with patient and patient received paper copy.     The risks, benefits and alternatives to the procedure were discussed.  We discussed the risks of pain, bleeding, blood clot, infection, allergic reaction, neurovascular injury, injury to uterus, injury to surrounding structures such as bowel, bladder and/or ureters, and possibility of inability to complete the procedure.  We discussed code status - full code.  Blood transfusion is acceptable.  We discussed resident physician participation in the procedure, including pelvic exam.  All questions answered, consent obtained.      Subjective  Chief Complaint   Patient presents with    Pre-op Exam     Pt is here for a pre-operative exam. (Diag. Lap. Cystectomy )           Nadine Lacey is a 34 y.o.  female  here for a pre-op consultation. She has a known adnexal cyst that may be paratubal. It's been seen on US in Oct,  and most recently in April. It has not changed significantly but she is having issues with pressure and discomfort in her LLQ. It was also noted that she may have a polyp so we discussed D&C/hysteroscopy at the time of her diagnostic laparoscopy, which she agreed to. She does have some irregularities with her periods where it starts and stops during the week of her bleeding.       Patient Active Problem List   Diagnosis    Other malaise and fatigue    Class 2 obesity with body mass index (BMI) of 36.0 to 36.9 in adult    IBS (irritable bowel syndrome)    Hypothyroidism    Anxiety    Atypical mole    Acute pain of  left shoulder    Low ferritin    Iron deficiency anemia, unspecified         Gynecologic History  Patient's last menstrual period was 2024 (approximate).  Contraception: none  Last Pap: 2022. Results were: normal      Obstetric History  OB History    Para Term  AB Living   0 0 0 0 0 0   SAB IAB Ectopic Multiple Live Births   0 0 0 0 0   Obstetric Comments   Menarche 14        Past Medical/Surgical/Family/Social History    Past Medical History:   Diagnosis Date    Anxiety     Constipation     Depression     Diarrhea     GERD (gastroesophageal reflux disease)     Hypothyroidism     IBS (irritable bowel syndrome)     Nausea     Panic attacks     Rash     Varicella      Past Surgical History:   Procedure Laterality Date    WISDOM TOOTH EXTRACTION       Family History   Problem Relation Age of Onset    Hypothyroidism Mother     Allergies Mother     Thyroid disease Mother     Diabetes Father     Hyperlipidemia Father     Hip dysplasia Sister     Seizures Sister     Depression Brother     Substance Abuse Brother         heroin    Stroke Paternal Grandmother     Skin cancer Paternal Grandfather     Prostate cancer Paternal Grandfather     Lung cancer Paternal Grandfather     Cancer Paternal Grandfather         Skin, Prostate, Brain,Lung    Osteoporosis Paternal Grandfather     Personality disorder Sister     Breast cancer Neg Hx     Colon cancer Neg Hx     Ovarian cancer Neg Hx     Uterine cancer Neg Hx     Cervical cancer Neg Hx      Social History     Socioeconomic History    Marital status: Single     Spouse name: Not on file    Number of children: Not on file    Years of education: Not on file    Highest education level: Not on file   Occupational History    Not on file   Tobacco Use    Smoking status: Never    Smokeless tobacco: Never   Vaping Use    Vaping status: Never Used   Substance and Sexual Activity    Alcohol use: Not Currently    Drug use: Never    Sexual activity: Never   Other Topics  "Concern    Not on file   Social History Narrative    Brother  in 2017      Social Determinants of Health     Financial Resource Strain: Not on file   Food Insecurity: Not on file   Transportation Needs: Not on file   Physical Activity: Not on file   Stress: Not on file   Social Connections: Not on file   Intimate Partner Violence: Not on file   Housing Stability: Not on file         Other and Strawberry c [ascorbate - food allergy]    Current Outpatient Medications:     Cholecalciferol 1.25 MG (36138 UT) TABS, Take 1 tablet (50,000 Units total) by mouth every 7 days x12wks and then take OTC Vit D 4000IU QD (Patient taking differently: Take 5,000 Units by mouth daily od), Disp: 12 tablet, Rfl: 0    ferrous sulfate 325 (65 Fe) mg tablet, Take by mouth daily with breakfast, Disp: , Rfl:     fluticasone (FLONASE) 50 mcg/act nasal spray, 1 spray into each nostril daily, Disp: 11.1 mL, Rfl: 0    naproxen (NAPROSYN) 500 mg tablet, Take 1 tablet (500 mg total) by mouth 2 (two) times a day with meals, Disp: 28 tablet, Rfl: 0    Synthroid 137 MCG tablet, Take 137 mcg by mouth daily, Disp: , Rfl:       Review of Systems  Review of Systems   Constitutional:  Negative for chills and fever.   Gastrointestinal:  Negative for nausea and vomiting.   Genitourinary:  Positive for menstrual problem and pelvic pain. Negative for dysuria and frequency.     Objective     /84 (BP Location: Right arm, Patient Position: Sitting, Cuff Size: Standard)   Ht 5' 6\" (1.676 m)   Wt 123 kg (272 lb)   LMP 2024 (Approximate)   BMI 43.90 kg/m²     Physical Exam  Constitutional:       General: She is not in acute distress.     Appearance: Normal appearance. She is well-developed. She is not ill-appearing.   Cardiovascular:      Rate and Rhythm: Normal rate and regular rhythm.   Pulmonary:      Effort: Pulmonary effort is normal. No respiratory distress.      Breath sounds: Normal breath sounds.   Abdominal:      General: " Abdomen is flat. There is no distension.      Palpations: Abdomen is soft.      Tenderness: There is no abdominal tenderness. There is no guarding or rebound.   Musculoskeletal:      Cervical back: Normal range of motion.      Right lower leg: No edema.      Left lower leg: No edema.   Neurological:      General: No focal deficit present.      Mental Status: She is alert and oriented to person, place, and time.   Skin:     General: Skin is warm and dry.   Psychiatric:         Mood and Affect: Mood normal.         Behavior: Behavior normal.         Thought Content: Thought content normal.         Judgment: Judgment normal.   Vitals and nursing note reviewed.

## 2024-04-22 NOTE — H&P (VIEW-ONLY)
Assessment Nadine was seen today for pre-op exam.    Diagnoses and all orders for this visit:    Pre-op examination    Adnexal mass    Pelvic pain    Endometrial polyp         Plan    Nadine Lacey is scheduled for exam under anesthesia, diagnostic laparoscopy, dilation and curettage , hysteroscopy, resection of uterine pathology, and adnexal cystectomy  on . Pre-op instructions, including showering with Hibiclens, discussed with patient and patient received paper copy.     The risks, benefits and alternatives to the procedure were discussed.  We discussed the risks of pain, bleeding, blood clot, infection, allergic reaction, neurovascular injury, injury to uterus, injury to surrounding structures such as bowel, bladder and/or ureters, and possibility of inability to complete the procedure.  We discussed code status - full code.  Blood transfusion is acceptable.  We discussed resident physician participation in the procedure, including pelvic exam.  All questions answered, consent obtained.      Subjective  Chief Complaint   Patient presents with    Pre-op Exam     Pt is here for a pre-operative exam. (Diag. Lap. Cystectomy )           Nadine Lacey is a 34 y.o.  female  here for a pre-op consultation. She has a known adnexal cyst that may be paratubal. It's been seen on US in Oct,  and most recently in April. It has not changed significantly but she is having issues with pressure and discomfort in her LLQ. It was also noted that she may have a polyp so we discussed D&C/hysteroscopy at the time of her diagnostic laparoscopy, which she agreed to. She does have some irregularities with her periods where it starts and stops during the week of her bleeding.       Patient Active Problem List   Diagnosis    Other malaise and fatigue    Class 2 obesity with body mass index (BMI) of 36.0 to 36.9 in adult    IBS (irritable bowel syndrome)    Hypothyroidism    Anxiety    Atypical mole    Acute pain of  left shoulder    Low ferritin    Iron deficiency anemia, unspecified         Gynecologic History  Patient's last menstrual period was 2024 (approximate).  Contraception: none  Last Pap: 2022. Results were: normal      Obstetric History  OB History    Para Term  AB Living   0 0 0 0 0 0   SAB IAB Ectopic Multiple Live Births   0 0 0 0 0   Obstetric Comments   Menarche 14        Past Medical/Surgical/Family/Social History    Past Medical History:   Diagnosis Date    Anxiety     Constipation     Depression     Diarrhea     GERD (gastroesophageal reflux disease)     Hypothyroidism     IBS (irritable bowel syndrome)     Nausea     Panic attacks     Rash     Varicella      Past Surgical History:   Procedure Laterality Date    WISDOM TOOTH EXTRACTION       Family History   Problem Relation Age of Onset    Hypothyroidism Mother     Allergies Mother     Thyroid disease Mother     Diabetes Father     Hyperlipidemia Father     Hip dysplasia Sister     Seizures Sister     Depression Brother     Substance Abuse Brother         heroin    Stroke Paternal Grandmother     Skin cancer Paternal Grandfather     Prostate cancer Paternal Grandfather     Lung cancer Paternal Grandfather     Cancer Paternal Grandfather         Skin, Prostate, Brain,Lung    Osteoporosis Paternal Grandfather     Personality disorder Sister     Breast cancer Neg Hx     Colon cancer Neg Hx     Ovarian cancer Neg Hx     Uterine cancer Neg Hx     Cervical cancer Neg Hx      Social History     Socioeconomic History    Marital status: Single     Spouse name: Not on file    Number of children: Not on file    Years of education: Not on file    Highest education level: Not on file   Occupational History    Not on file   Tobacco Use    Smoking status: Never    Smokeless tobacco: Never   Vaping Use    Vaping status: Never Used   Substance and Sexual Activity    Alcohol use: Not Currently    Drug use: Never    Sexual activity: Never   Other Topics  "Concern    Not on file   Social History Narrative    Brother  in 2017      Social Determinants of Health     Financial Resource Strain: Not on file   Food Insecurity: Not on file   Transportation Needs: Not on file   Physical Activity: Not on file   Stress: Not on file   Social Connections: Not on file   Intimate Partner Violence: Not on file   Housing Stability: Not on file         Other and Strawberry c [ascorbate - food allergy]    Current Outpatient Medications:     Cholecalciferol 1.25 MG (22558 UT) TABS, Take 1 tablet (50,000 Units total) by mouth every 7 days x12wks and then take OTC Vit D 4000IU QD (Patient taking differently: Take 5,000 Units by mouth daily od), Disp: 12 tablet, Rfl: 0    ferrous sulfate 325 (65 Fe) mg tablet, Take by mouth daily with breakfast, Disp: , Rfl:     fluticasone (FLONASE) 50 mcg/act nasal spray, 1 spray into each nostril daily, Disp: 11.1 mL, Rfl: 0    naproxen (NAPROSYN) 500 mg tablet, Take 1 tablet (500 mg total) by mouth 2 (two) times a day with meals, Disp: 28 tablet, Rfl: 0    Synthroid 137 MCG tablet, Take 137 mcg by mouth daily, Disp: , Rfl:       Review of Systems  Review of Systems   Constitutional:  Negative for chills and fever.   Gastrointestinal:  Negative for nausea and vomiting.   Genitourinary:  Positive for menstrual problem and pelvic pain. Negative for dysuria and frequency.     Objective     /84 (BP Location: Right arm, Patient Position: Sitting, Cuff Size: Standard)   Ht 5' 6\" (1.676 m)   Wt 123 kg (272 lb)   LMP 2024 (Approximate)   BMI 43.90 kg/m²     Physical Exam  Constitutional:       General: She is not in acute distress.     Appearance: Normal appearance. She is well-developed. She is not ill-appearing.   Cardiovascular:      Rate and Rhythm: Normal rate and regular rhythm.   Pulmonary:      Effort: Pulmonary effort is normal. No respiratory distress.      Breath sounds: Normal breath sounds.   Abdominal:      General: " Abdomen is flat. There is no distension.      Palpations: Abdomen is soft.      Tenderness: There is no abdominal tenderness. There is no guarding or rebound.   Musculoskeletal:      Cervical back: Normal range of motion.      Right lower leg: No edema.      Left lower leg: No edema.   Neurological:      General: No focal deficit present.      Mental Status: She is alert and oriented to person, place, and time.   Skin:     General: Skin is warm and dry.   Psychiatric:         Mood and Affect: Mood normal.         Behavior: Behavior normal.         Thought Content: Thought content normal.         Judgment: Judgment normal.   Vitals and nursing note reviewed.

## 2024-04-24 ENCOUNTER — HOSPITAL ENCOUNTER (OUTPATIENT)
Dept: INFUSION CENTER | Facility: CLINIC | Age: 34
Discharge: HOME | End: 2024-04-24
Payer: COMMERCIAL

## 2024-04-24 VITALS
HEART RATE: 86 BPM | DIASTOLIC BLOOD PRESSURE: 82 MMHG | SYSTOLIC BLOOD PRESSURE: 136 MMHG | OXYGEN SATURATION: 98 % | TEMPERATURE: 98.1 F | RESPIRATION RATE: 18 BRPM

## 2024-04-24 DIAGNOSIS — R79.0 LOW FERRITIN: Primary | ICD-10-CM

## 2024-04-24 PROCEDURE — 96365 THER/PROPH/DIAG IV INF INIT: CPT

## 2024-04-24 RX ORDER — SODIUM CHLORIDE 9 MG/ML
20 INJECTION, SOLUTION INTRAVENOUS ONCE
Status: COMPLETED | OUTPATIENT
Start: 2024-04-24 | End: 2024-04-24

## 2024-04-24 RX ORDER — SODIUM CHLORIDE 9 MG/ML
20 INJECTION, SOLUTION INTRAVENOUS ONCE
Status: CANCELLED | OUTPATIENT
Start: 2024-05-02

## 2024-04-24 RX ADMIN — SODIUM CHLORIDE 20 ML/HR: 0.9 INJECTION, SOLUTION INTRAVENOUS at 10:37

## 2024-04-24 RX ADMIN — IRON SUCROSE 200 MG: 20 INJECTION, SOLUTION INTRAVENOUS at 10:36

## 2024-04-24 NOTE — PROGRESS NOTES
Patient tolerated infusion without issue. PIV removed R FA, bleeding controlled, coban and gauze applied. Confirmed next appointment 5/2 at 1030, declined AVS, ambulatory from department.

## 2024-04-24 NOTE — PROGRESS NOTES
Pt here for venofer, offers no complaints, pt has had venofer in the past without issue, IV placed, resting comfortably in chair

## 2024-05-01 ENCOUNTER — ANESTHESIA EVENT (OUTPATIENT)
Dept: PERIOP | Facility: AMBULARY SURGERY CENTER | Age: 34
End: 2024-05-01
Payer: COMMERCIAL

## 2024-05-02 ENCOUNTER — HOSPITAL ENCOUNTER (OUTPATIENT)
Dept: INFUSION CENTER | Facility: CLINIC | Age: 34
Discharge: HOME | End: 2024-05-02
Payer: COMMERCIAL

## 2024-05-02 VITALS
HEART RATE: 70 BPM | SYSTOLIC BLOOD PRESSURE: 98 MMHG | DIASTOLIC BLOOD PRESSURE: 72 MMHG | TEMPERATURE: 97.7 F | RESPIRATION RATE: 18 BRPM | OXYGEN SATURATION: 97 %

## 2024-05-02 DIAGNOSIS — R79.0 LOW FERRITIN: Primary | ICD-10-CM

## 2024-05-02 PROCEDURE — 96365 THER/PROPH/DIAG IV INF INIT: CPT

## 2024-05-02 RX ORDER — SODIUM CHLORIDE 9 MG/ML
20 INJECTION, SOLUTION INTRAVENOUS ONCE
Status: COMPLETED | OUTPATIENT
Start: 2024-05-02 | End: 2024-05-02

## 2024-05-02 RX ORDER — SODIUM CHLORIDE 9 MG/ML
20 INJECTION, SOLUTION INTRAVENOUS ONCE
Status: CANCELLED | OUTPATIENT
Start: 2024-05-08

## 2024-05-02 RX ADMIN — IRON SUCROSE 200 MG: 20 INJECTION, SOLUTION INTRAVENOUS at 10:43

## 2024-05-02 RX ADMIN — SODIUM CHLORIDE 20 ML/HR: 0.9 INJECTION, SOLUTION INTRAVENOUS at 10:43

## 2024-05-02 NOTE — PROGRESS NOTES
Patient tolerated infusion without issue. PIV removed R FA, bleeding controlled, gauze and coban applied. Confirmed next appointment 5/8 at 1030, provided printed AVS, ambulatory from department.

## 2024-05-08 ENCOUNTER — HOSPITAL ENCOUNTER (OUTPATIENT)
Dept: INFUSION CENTER | Facility: CLINIC | Age: 34
Discharge: HOME | End: 2024-05-08
Payer: COMMERCIAL

## 2024-05-08 VITALS
SYSTOLIC BLOOD PRESSURE: 118 MMHG | DIASTOLIC BLOOD PRESSURE: 81 MMHG | HEART RATE: 77 BPM | OXYGEN SATURATION: 98 % | TEMPERATURE: 97.6 F

## 2024-05-08 DIAGNOSIS — R79.0 LOW FERRITIN: Primary | ICD-10-CM

## 2024-05-08 RX ORDER — SODIUM CHLORIDE 9 MG/ML
20 INJECTION, SOLUTION INTRAVENOUS ONCE
Status: COMPLETED | OUTPATIENT
Start: 2024-05-08 | End: 2024-05-08

## 2024-05-08 RX ORDER — SODIUM CHLORIDE 9 MG/ML
20 INJECTION, SOLUTION INTRAVENOUS ONCE
Status: CANCELLED | OUTPATIENT
Start: 2024-05-09

## 2024-05-08 RX ADMIN — SODIUM CHLORIDE 20 ML/HR: 0.9 INJECTION, SOLUTION INTRAVENOUS at 10:58

## 2024-05-08 RX ADMIN — IRON SUCROSE 200 MG: 20 INJECTION, SOLUTION INTRAVENOUS at 11:03

## 2024-05-08 NOTE — PROGRESS NOTES
Patient tolerated her treatment without any adverse reactions. Today was her last ordered dose. Next Dr's appointment confirmed for 8/14/24 at 1000. Patient declined avs

## 2024-05-08 NOTE — PRE-PROCEDURE INSTRUCTIONS
Pre-Surgery Instructions:   Medication Instructions    Cholecalciferol 1.25 MG (72088 UT) TABS Last dose 5-8-24    fluticasone (FLONASE) 50 mcg/act nasal spray Uses PRN- OK to take day of surgery    naproxen (NAPROSYN) 500 mg tablet Last dose 5-7-24    Synthroid 137 MCG tablet Take day of surgery.   Medication instructions for day surgery reviewed. Please use only a sip of water to take your instructed medications. Avoid all over the counter vitamins, supplements and NSAIDS for one week prior to surgery per anesthesia guidelines. Tylenol is ok to take as needed.     You will receive a call one business day prior to surgery with an arrival time and hospital directions. If your surgery is scheduled on a Monday, the hospital will be calling you on the Friday prior to your surgery. If you have not heard from anyone by 8pm, please call the hospital supervisor through the hospital  at [phone] or [place] [phone]).    Do not eat or drink anything after midnight the night before your surgery, including candy, mints, lifesavers, or chewing gum. Do not drink alcohol 24hrs before your surgery. Try not to smoke at least 24hrs before your surgery.       Follow the pre surgery showering instructions as listed in the “My Surgical Experience Booklet” or otherwise provided by your surgeon's office. Do not use a blade to shave the surgical area 1 week before surgery. It is okay to use a clean electric clippers up to 24 hours before surgery. Do not apply any lotions, creams, including makeup, cologne, deodorant, or perfumes after showering on the day of your surgery. Do not use dry shampoo, hair spray, hair gel, or any type of hair products.     No contact lenses, eye make-up, or artificial eyelashes. Remove nail polish, including gel polish, and any artificial, gel, or acrylic nails if possible. Remove all jewelry including rings and body piercing jewelry.     Wear causal clothing that is easy to take on and off.  Consider your type of surgery.    Keep any valuables, jewelry, piercings at home. Please bring any specially ordered equipment (sling, braces) if indicated.    Arrange for a responsible person to drive you to and from the hospital on the day of your surgery. Please confirm the visitor policy for the day of your procedure when you receive your phone call with an arrival time.     Call the surgeon's office with any new illnesses, exposures, or additional questions prior to surgery.    Please reference your “My Surgical Experience Booklet” for additional information to prepare for your upcoming surgery.

## 2024-05-13 ENCOUNTER — HOSPITAL ENCOUNTER (OUTPATIENT)
Facility: AMBULARY SURGERY CENTER | Age: 34
Setting detail: OUTPATIENT SURGERY
Discharge: HOME | End: 2024-05-13
Attending: OBSTETRICS & GYNECOLOGY | Admitting: OBSTETRICS & GYNECOLOGY
Payer: COMMERCIAL

## 2024-05-13 ENCOUNTER — ANESTHESIA (OUTPATIENT)
Dept: PERIOP | Facility: AMBULARY SURGERY CENTER | Age: 34
End: 2024-05-13
Payer: COMMERCIAL

## 2024-05-13 VITALS
RESPIRATION RATE: 16 BRPM | HEIGHT: 66 IN | OXYGEN SATURATION: 98 % | WEIGHT: 265 LBS | DIASTOLIC BLOOD PRESSURE: 72 MMHG | SYSTOLIC BLOOD PRESSURE: 128 MMHG | BODY MASS INDEX: 42.59 KG/M2 | HEART RATE: 81 BPM | TEMPERATURE: 97.2 F

## 2024-05-13 DIAGNOSIS — N84.0 ENDOMETRIAL POLYP: ICD-10-CM

## 2024-05-13 DIAGNOSIS — R10.2 PELVIC PAIN: ICD-10-CM

## 2024-05-13 DIAGNOSIS — N94.9 ADNEXAL CYST: ICD-10-CM

## 2024-05-13 PROBLEM — Z90.6 H/O TOTAL CYSTECTOMY: Status: ACTIVE | Noted: 2024-05-13

## 2024-05-13 LAB
EXT PREGNANCY TEST URINE: NEGATIVE
EXT. CONTROL: NORMAL

## 2024-05-13 PROCEDURE — 58558 HYSTEROSCOPY BIOPSY: CPT | Performed by: OBSTETRICS & GYNECOLOGY

## 2024-05-13 PROCEDURE — 88304 TISSUE EXAM BY PATHOLOGIST: CPT | Performed by: PATHOLOGY

## 2024-05-13 PROCEDURE — 88305 TISSUE EXAM BY PATHOLOGIST: CPT | Performed by: PATHOLOGY

## 2024-05-13 PROCEDURE — 58662 LAPAROSCOPY EXCISE LESIONS: CPT | Performed by: OBSTETRICS & GYNECOLOGY

## 2024-05-13 PROCEDURE — 81025 URINE PREGNANCY TEST: CPT | Performed by: ANESTHESIOLOGY

## 2024-05-13 RX ORDER — DEXAMETHASONE SODIUM PHOSPHATE 10 MG/ML
INJECTION, SOLUTION INTRAMUSCULAR; INTRAVENOUS AS NEEDED
Status: DISCONTINUED | OUTPATIENT
Start: 2024-05-13 | End: 2024-05-13

## 2024-05-13 RX ORDER — MAGNESIUM HYDROXIDE 1200 MG/15ML
LIQUID ORAL AS NEEDED
Status: DISCONTINUED | OUTPATIENT
Start: 2024-05-13 | End: 2024-05-13 | Stop reason: HOSPADM

## 2024-05-13 RX ORDER — ONDANSETRON 2 MG/ML
4 INJECTION INTRAMUSCULAR; INTRAVENOUS EVERY 6 HOURS PRN
Status: CANCELLED | OUTPATIENT
Start: 2024-05-13

## 2024-05-13 RX ORDER — IBUPROFEN 600 MG/1
600 TABLET ORAL EVERY 6 HOURS PRN
Status: DISCONTINUED | OUTPATIENT
Start: 2024-05-13 | End: 2024-05-13 | Stop reason: HOSPADM

## 2024-05-13 RX ORDER — FENTANYL CITRATE/PF 50 MCG/ML
50 SYRINGE (ML) INJECTION
Status: DISCONTINUED | OUTPATIENT
Start: 2024-05-13 | End: 2024-05-13 | Stop reason: HOSPADM

## 2024-05-13 RX ORDER — BUPIVACAINE HYDROCHLORIDE 2.5 MG/ML
INJECTION, SOLUTION EPIDURAL; INFILTRATION; INTRACAUDAL AS NEEDED
Status: DISCONTINUED | OUTPATIENT
Start: 2024-05-13 | End: 2024-05-13 | Stop reason: HOSPADM

## 2024-05-13 RX ORDER — OXYCODONE HYDROCHLORIDE 5 MG/1
5 TABLET ORAL EVERY 4 HOURS PRN
Status: DISCONTINUED | OUTPATIENT
Start: 2024-05-13 | End: 2024-05-13 | Stop reason: HOSPADM

## 2024-05-13 RX ORDER — MIDAZOLAM HYDROCHLORIDE 2 MG/2ML
INJECTION, SOLUTION INTRAMUSCULAR; INTRAVENOUS AS NEEDED
Status: DISCONTINUED | OUTPATIENT
Start: 2024-05-13 | End: 2024-05-13

## 2024-05-13 RX ORDER — OXYCODONE HYDROCHLORIDE 5 MG/1
10 TABLET ORAL EVERY 4 HOURS PRN
Status: DISCONTINUED | OUTPATIENT
Start: 2024-05-13 | End: 2024-05-13 | Stop reason: HOSPADM

## 2024-05-13 RX ORDER — ACETAMINOPHEN 325 MG/1
975 TABLET ORAL EVERY 6 HOURS PRN
Status: DISCONTINUED | OUTPATIENT
Start: 2024-05-13 | End: 2024-05-13 | Stop reason: HOSPADM

## 2024-05-13 RX ORDER — SODIUM CHLORIDE, SODIUM LACTATE, POTASSIUM CHLORIDE, CALCIUM CHLORIDE 600; 310; 30; 20 MG/100ML; MG/100ML; MG/100ML; MG/100ML
INJECTION, SOLUTION INTRAVENOUS CONTINUOUS PRN
Status: DISCONTINUED | OUTPATIENT
Start: 2024-05-13 | End: 2024-05-13

## 2024-05-13 RX ORDER — ONDANSETRON 2 MG/ML
4 INJECTION INTRAMUSCULAR; INTRAVENOUS ONCE AS NEEDED
Status: DISCONTINUED | OUTPATIENT
Start: 2024-05-13 | End: 2024-05-13 | Stop reason: HOSPADM

## 2024-05-13 RX ORDER — OXYCODONE HYDROCHLORIDE 5 MG/1
5 TABLET ORAL EVERY 4 HOURS PRN
Status: DISCONTINUED | OUTPATIENT
Start: 2024-05-13 | End: 2024-05-13

## 2024-05-13 RX ORDER — ONDANSETRON 2 MG/ML
INJECTION INTRAMUSCULAR; INTRAVENOUS AS NEEDED
Status: DISCONTINUED | OUTPATIENT
Start: 2024-05-13 | End: 2024-05-13

## 2024-05-13 RX ORDER — ROCURONIUM BROMIDE 10 MG/ML
INJECTION, SOLUTION INTRAVENOUS AS NEEDED
Status: DISCONTINUED | OUTPATIENT
Start: 2024-05-13 | End: 2024-05-13

## 2024-05-13 RX ORDER — SODIUM CHLORIDE, SODIUM LACTATE, POTASSIUM CHLORIDE, CALCIUM CHLORIDE 600; 310; 30; 20 MG/100ML; MG/100ML; MG/100ML; MG/100ML
50 INJECTION, SOLUTION INTRAVENOUS CONTINUOUS
Status: DISCONTINUED | OUTPATIENT
Start: 2024-05-13 | End: 2024-05-13 | Stop reason: HOSPADM

## 2024-05-13 RX ORDER — PROPOFOL 10 MG/ML
INJECTION, EMULSION INTRAVENOUS CONTINUOUS PRN
Status: DISCONTINUED | OUTPATIENT
Start: 2024-05-13 | End: 2024-05-13

## 2024-05-13 RX ORDER — FENTANYL CITRATE 50 UG/ML
INJECTION, SOLUTION INTRAMUSCULAR; INTRAVENOUS AS NEEDED
Status: DISCONTINUED | OUTPATIENT
Start: 2024-05-13 | End: 2024-05-13

## 2024-05-13 RX ORDER — PROPOFOL 10 MG/ML
INJECTION, EMULSION INTRAVENOUS AS NEEDED
Status: DISCONTINUED | OUTPATIENT
Start: 2024-05-13 | End: 2024-05-13

## 2024-05-13 RX ORDER — LIDOCAINE HYDROCHLORIDE 10 MG/ML
INJECTION, SOLUTION EPIDURAL; INFILTRATION; INTRACAUDAL; PERINEURAL AS NEEDED
Status: DISCONTINUED | OUTPATIENT
Start: 2024-05-13 | End: 2024-05-13

## 2024-05-13 RX ORDER — ONDANSETRON 2 MG/ML
4 INJECTION INTRAMUSCULAR; INTRAVENOUS EVERY 6 HOURS PRN
Status: DISCONTINUED | OUTPATIENT
Start: 2024-05-13 | End: 2024-05-13 | Stop reason: HOSPADM

## 2024-05-13 RX ADMIN — FENTANYL CITRATE 50 MCG: 50 INJECTION INTRAMUSCULAR; INTRAVENOUS at 13:12

## 2024-05-13 RX ADMIN — ONDANSETRON 4 MG: 2 INJECTION INTRAMUSCULAR; INTRAVENOUS at 15:14

## 2024-05-13 RX ADMIN — ROCURONIUM BROMIDE 50 MG: 10 INJECTION, SOLUTION INTRAVENOUS at 12:33

## 2024-05-13 RX ADMIN — FENTANYL CITRATE 50 MCG: 50 INJECTION INTRAMUSCULAR; INTRAVENOUS at 13:34

## 2024-05-13 RX ADMIN — SODIUM CHLORIDE, SODIUM LACTATE, POTASSIUM CHLORIDE, AND CALCIUM CHLORIDE: .6; .31; .03; .02 INJECTION, SOLUTION INTRAVENOUS at 13:48

## 2024-05-13 RX ADMIN — OXYCODONE HYDROCHLORIDE 10 MG: 5 TABLET ORAL at 14:47

## 2024-05-13 RX ADMIN — FENTANYL CITRATE 50 MCG: 50 INJECTION INTRAMUSCULAR; INTRAVENOUS at 12:26

## 2024-05-13 RX ADMIN — SODIUM CHLORIDE, SODIUM LACTATE, POTASSIUM CHLORIDE, AND CALCIUM CHLORIDE: .6; .31; .03; .02 INJECTION, SOLUTION INTRAVENOUS at 12:21

## 2024-05-13 RX ADMIN — ONDANSETRON 4 MG: 2 INJECTION INTRAMUSCULAR; INTRAVENOUS at 12:33

## 2024-05-13 RX ADMIN — FENTANYL CITRATE 50 MCG: 50 INJECTION INTRAMUSCULAR; INTRAVENOUS at 12:59

## 2024-05-13 RX ADMIN — SUGAMMADEX 200 MG: 100 INJECTION, SOLUTION INTRAVENOUS at 13:50

## 2024-05-13 RX ADMIN — LIDOCAINE HYDROCHLORIDE 50 MG: 10 INJECTION, SOLUTION EPIDURAL; INFILTRATION; INTRACAUDAL; PERINEURAL at 12:33

## 2024-05-13 RX ADMIN — FENTANYL CITRATE 50 MCG: 50 INJECTION INTRAMUSCULAR; INTRAVENOUS at 14:15

## 2024-05-13 RX ADMIN — MIDAZOLAM 2 MG: 1 INJECTION INTRAMUSCULAR; INTRAVENOUS at 12:26

## 2024-05-13 RX ADMIN — FENTANYL CITRATE 50 MCG: 50 INJECTION INTRAMUSCULAR; INTRAVENOUS at 14:07

## 2024-05-13 RX ADMIN — PROPOFOL 200 MG: 10 INJECTION, EMULSION INTRAVENOUS at 12:33

## 2024-05-13 RX ADMIN — FENTANYL CITRATE 50 MCG: 50 INJECTION INTRAMUSCULAR; INTRAVENOUS at 12:47

## 2024-05-13 RX ADMIN — DEXAMETHASONE SODIUM PHOSPHATE 10 MG: 10 INJECTION, SOLUTION INTRAMUSCULAR; INTRAVENOUS at 12:33

## 2024-05-13 RX ADMIN — FENTANYL CITRATE 50 MCG: 50 INJECTION INTRAMUSCULAR; INTRAVENOUS at 13:46

## 2024-05-13 RX ADMIN — PROPOFOL 50 MCG/KG/MIN: 10 INJECTION, EMULSION INTRAVENOUS at 12:36

## 2024-05-13 NOTE — OP NOTE
OPERATIVE REPORT  PATIENT NAME: Nadine Lacey    :  1990  MRN: [id number]  Pt Location: [facility] OR [facility] 01    SURGERY DATE: 2024    Surgeons and Role:     * Saritha Hoyos DO - Primary     * Anny Warner MD - Assisting    Preop Diagnosis:  Adnexal cyst [N94.9]  Pelvic pain [R10.2]  Endometrial polyp [N84.0]    Post-Op Diagnosis Codes:     * Adnexal cyst [N94.9]     * Pelvic pain [R10.2]     * Endometrial polyp [N84.0]    Procedure(s):  EXAM UNDER ANESTHESIA; LAPAROSCOPY DIAGNOSTIC; LAPAROSCOPIC. LEFT PARA-TUBAL CYSTECTOMY. LYSIS OF ADHESIONS  Hysteroscopy D+C    Specimen(s):  ID Type Source Tests Collected by Time Destination   1 : LEFT PERITUBAL CYST WALL Tissue Fallopian Tube, Left TISSUE EXAM Saritha Hoyos, DO 2024 1323    2 : ENDOMETRIAL CURETTINGS Tissue Endometrium TISSUE EXAM Saritha Hoyos, DO 2024 1344        Estimated Blood Loss:   Minimal    Anesthesia Type:   General    Operative Indications:  Adnexal cyst [N94.9]  Pelvic pain [R10.2]  Endometrial polyp [N84.0]    Operative Findings:  Normal external female genitalia.  Uterus anteverted, mobile and sounding to 8 cm.  Normal appearing uterus and right fallopian tube and ovary.  Left fallopian tube with a 5 cm serous cyst within the mesosalpinx. Ovarian cyst wall successfully removed.   Left ovary appears normal.  Endometrial lining with thick and lush tissue throughout, though no discrete polyp noted.     Complications:   None    Procedure and Technique:  Patient was taken to the operating room where a time out was performed to confirm correct patient and correct procedure. General endotracheal anesthesia (GET) was administered and the patient was positioned on the OR table in the dorsal lithotomy position. All pressure points were padded and a yakov hugger was placed to maintain control of core body temperature. The patient was prepped and draped in the usual sterile fashion.    A catheter was placed into the  bladder. A safia retractor inserted into the vagina and a Sanchez retractor was used to visualize the anterior lip of the cervix, which was then grasped with a single toothed tenaculum. A dilator was placed into the cervix and this was attached to the tenaculum to help manipulating.     Surgeons gloves were then changed, and attention was then turned to the abdomen, where 5 cc of local was injected infraumbilically. The Veress needle was then introduced at a 45 degree angle while tenting the abdominal wall. Intraperitoneal placement was confirmed with a drop in pressure while CO2 was flowing. This was used to obtain adequate pneumoperitoneum. A small incision was then made infraumbilically, and a 5mm trocar and sleeve were inserted through the incision into the peritoneum without difficulty. Laparoscopic visualization confirmed the intraperitoneal insertion of the port. Pneumoperitoneum was then maintained using carbon dioxide.      Subsequently, after infiltrating the areas with local, two additional small incisions were made in both the right and left lower quadrants, approximately 3 cm above and 3 cm medial to the anterior superior iliac spine. Two ports (5mm, 5mm) were introduced under direct visualization.      The patient was placed in trendelenburg, and attention was then turned to a 5 cm paratubal cyst on the left fallopian tube which was adhered to epiploica of the bowel. Using laparoscopic scissors, the adhesions from the bowel to the cyst were incised. The cyst was then drained using suction device. The anterior portion of the cyst was then removed using the Enseal device. The remained of the cyst wall was removed thereafter, using traction and counter-traction. The cyst wall was removed from a trocar site and sent to pathology. Some oozing was noted from the site of cyst removal and decision was made to use Surgical powder on the area.     The abdomen and pelvis was visualized, irrigated, and good  hemostasis was noted. Pneumoperitoneum was then evacuated and the trocars were removed. The trocar incisions were closed using 4-0 monocryl and exofin was placed.    Attention was then turned to the vagina. The dilator was removed. The uterus was sounded to 8cm. The cervix was serially dilated using Warren dilators for introduction of the hysteroscope.     Hysteroscope was introduced under direct visualization using normal saline solution as the distention media. Hysteroscope was advanced to the uterine fundus and the entire uterine cavity was inspected in a systematic manner. There was noted to be very thick and lush endometrial lining, with no discrete polyp. Hysteroscope was withdrawn and sharp curetting was performed, starting at the 12'oclock position and rotating a total of 360 degrees to cover all surfaces. Endometrial tissue was obtained and sent for pathology.     The single toothed tenaculum was removed from the anterior lip of the cervix. Good hemostasis was confirmed at the tenaculum puncture sites. Weighted speculum was then removed from the vagina.    At the conclusion of the procedure, all needle, sponge, and instrument counts were noted to be correct x2.     Dr. Hoyos was present and participated in all key portions of the case.      SIGNATURE: Anny Warner MD  DATE: May 13, 2024  TIME: 2:12 PM

## 2024-05-13 NOTE — ANESTHESIA PREPROCEDURE EVALUATION
Procedure:  EXAM UNDER ANESTHESIA; LAPAROSCOPY DIAGNOSTIC; LEFT CYSTECTOMY OVARIAN, LAPAROSCOPIC. POSSIBLE LEFT SALPINGO-OOPHORECTOMY (Abdomen)  Hysteroscopy D+C (resection of uterine tumor/fibroid) (Uterus)    Relevant Problems   ANESTHESIA (within normal limits)      CARDIO (within normal limits)      ENDO   (+) Hypothyroidism      GI/HEPATIC (within normal limits)      /RENAL (within normal limits)      GYN (within normal limits)      HEMATOLOGY   (+) Iron deficiency anemia, unspecified      MUSCULOSKELETAL (within normal limits)      NEURO/PSYCH   (+) Anxiety      PULMONARY (within normal limits)        Physical Exam    Airway       Dental       Cardiovascular  Cardiovascular exam normal    Pulmonary  Pulmonary exam normal     Other Findings  post-pubertal.      Anesthesia Plan  ASA Score- 2     Anesthesia Type- general with ASA Monitors.         Additional Monitors:     Airway Plan: ETT.           Plan Factors-Exercise tolerance (METS): >4 METS.    Chart reviewed.    Patient summary reviewed.    Patient is not a current smoker. Patient not instructed to abstain from smoking on day of procedure. Patient did not smoke on day of surgery.            Induction- intravenous.    Postoperative Plan-     Informed Consent- Anesthetic plan and risks discussed with patient.  I personally reviewed this patient with the CRNA. Discussed and agreed on the Anesthesia Plan with the CRNA..

## 2024-05-13 NOTE — ANESTHESIA POSTPROCEDURE EVALUATION
Post-Op Assessment Note    CV Status:  Stable  Pain Score: 0    Pain management: adequate       Mental Status:  Arousable   Hydration Status:  Stable   PONV Controlled:  None   Airway Patency:  Patent     Post Op Vitals Reviewed: Yes    No anethesia notable event occurred.    Staff: Anesthesiologist, CRNA               BP   138/74   Temp 98   Pulse 64   Resp 16   SpO2 99

## 2024-05-13 NOTE — INTERVAL H&P NOTE
H&P reviewed. After examining the patient I find no changes in the patients condition since the H&P had been written.    Vitals:    05/13/24 1144   BP: 142/86   Pulse: 91   Resp: 16   Temp: 98.1 °F (36.7 °C)   SpO2: 98%

## 2024-05-17 PROCEDURE — 88305 TISSUE EXAM BY PATHOLOGIST: CPT | Performed by: PATHOLOGY

## 2024-05-17 PROCEDURE — 88304 TISSUE EXAM BY PATHOLOGIST: CPT | Performed by: PATHOLOGY

## 2024-05-20 ENCOUNTER — OFFICE VISIT (OUTPATIENT)
Dept: OBGYN CLINIC | Facility: CLINIC | Age: 34
End: 2024-05-20

## 2024-05-20 VITALS
SYSTOLIC BLOOD PRESSURE: 106 MMHG | DIASTOLIC BLOOD PRESSURE: 72 MMHG | BODY MASS INDEX: 42.75 KG/M2 | WEIGHT: 266 LBS | HEIGHT: 66 IN

## 2024-05-20 DIAGNOSIS — Z48.89 POSTOPERATIVE VISIT: Primary | ICD-10-CM

## 2024-05-20 PROCEDURE — 99024 POSTOP FOLLOW-UP VISIT: CPT | Performed by: OBSTETRICS & GYNECOLOGY

## 2024-05-20 NOTE — PROGRESS NOTES
Assessment   Nadine was seen today for post operative care.    Diagnoses and all orders for this visit:    Postoperative visit      Doing well, getting better.     Plan    1. Continue any current medications.  2. Activity restrictions: none  3. RTO in December for annual exam        Subjective    Nadine Lacey is a 34 y.o.  female who presents status post exam under anesthesia, left ovarian cystectomy, diagnostic laparoscopy, lysis of adhesions, dilation and curettage , and hysteroscopy on  for adnexal mass.     Eating a ok, still with nausea. It's getting better.   Bowel movements are  not back at baseline. She is taking stool softeners .   She took an ibuprofen today.   She felt warm/off the past couple days.   She was due for her period in the post op period, but did not come.   Operative findings, photos and pathology discussed with patient.     The following portions of the patient's history were reviewed and updated as appropriate: allergies, current medications, past family history, past medical history, past social history, past surgical history, and problem list.    Allergies:  Other and Strawberry c [ascorbate - food allergy]    Current Outpatient Medications:     Cholecalciferol 1.25 MG (10093 UT) TABS, Take 1 tablet (50,000 Units total) by mouth every 7 days x12wks and then take OTC Vit D 4000IU QD (Patient taking differently: Take 5,000 Units by mouth daily od), Disp: 12 tablet, Rfl: 0    ferrous sulfate 325 (65 Fe) mg tablet, Take by mouth daily with breakfast, Disp: , Rfl:     fluticasone (FLONASE) 50 mcg/act nasal spray, 1 spray into each nostril daily (Patient taking differently: 1 spray into each nostril as needed), Disp: 11.1 mL, Rfl: 0    naproxen (NAPROSYN) 500 mg tablet, Take 1 tablet (500 mg total) by mouth 2 (two) times a day with meals (Patient taking differently: Take 500 mg by mouth as needed), Disp: 28 tablet, Rfl: 0    Synthroid 137 MCG tablet, Take 137 mcg by mouth every  "morning, Disp: , Rfl:       Review of Systems  Review of Systems   Respiratory:  Negative for shortness of breath.    Cardiovascular:  Negative for chest pain.   Gastrointestinal:  Positive for constipation and nausea. Negative for vomiting.   Genitourinary:  Negative for difficulty urinating, dysuria, frequency and vaginal bleeding.          Objective   /72 (BP Location: Left arm, Patient Position: Sitting, Cuff Size: Standard)   Ht 5' 6\" (1.676 m)   Wt 121 kg (266 lb)   LMP 04/29/2024 (Approximate)   BMI 42.93 kg/m²     Physical Exam  Constitutional:       General: She is not in acute distress.     Appearance: Normal appearance. She is well-developed. She is not ill-appearing.   Pulmonary:      Effort: Pulmonary effort is normal. No respiratory distress.   Abdominal:      General: Abdomen is flat. Bowel sounds are normal. There is no distension.      Tenderness: There is abdominal tenderness. There is no guarding or rebound.      Comments: Incisions c/d/i   Neurological:      General: No focal deficit present.      Mental Status: She is alert and oriented to person, place, and time.   Psychiatric:         Mood and Affect: Mood normal.         Behavior: Behavior normal.         Thought Content: Thought content normal.         Judgment: Judgment normal.   Vitals and nursing note reviewed.             "

## 2024-08-01 LAB
ALBUMIN SERPL-MCNC: 4.2 G/DL (ref 3.6–5.1)
ALBUMIN/GLOB SERPL: 1.6 (CALC) (ref 1–2.5)
ALP SERPL-CCNC: 67 U/L (ref 31–125)
ALT SERPL-CCNC: 8 U/L (ref 6–29)
AST SERPL-CCNC: 11 U/L (ref 10–30)
BASOPHILS # BLD AUTO: 60 CELLS/UL (ref 0–200)
BASOPHILS NFR BLD AUTO: 1 %
BILIRUB SERPL-MCNC: 0.6 MG/DL (ref 0.2–1.2)
BUN SERPL-MCNC: 9 MG/DL (ref 7–25)
BUN/CREAT SERPL: NORMAL (CALC) (ref 6–22)
CALCIUM SERPL-MCNC: 9.3 MG/DL (ref 8.6–10.2)
CHLORIDE SERPL-SCNC: 104 MMOL/L (ref 98–110)
CO2 SERPL-SCNC: 26 MMOL/L (ref 20–32)
CREAT SERPL-MCNC: 0.69 MG/DL (ref 0.5–0.97)
EOSINOPHIL # BLD AUTO: 132 CELLS/UL (ref 15–500)
EOSINOPHIL NFR BLD AUTO: 2.2 %
ERYTHROCYTE [DISTWIDTH] IN BLOOD BY AUTOMATED COUNT: 12.6 % (ref 11–15)
FERRITIN SERPL-MCNC: 62 NG/ML (ref 16–154)
FOLATE SERPL-MCNC: 8.5 NG/ML
GFR/BSA.PRED SERPLBLD CYS-BASED-ARV: 118 ML/MIN/1.73M2
GLOBULIN SER CALC-MCNC: 2.7 G/DL (CALC) (ref 1.9–3.7)
GLUCOSE SERPL-MCNC: 86 MG/DL (ref 65–99)
HCT VFR BLD AUTO: 39.9 % (ref 35–45)
HGB BLD-MCNC: 13.4 G/DL (ref 11.7–15.5)
IRON SATN MFR SERPL: 44 % (CALC) (ref 16–45)
IRON SERPL-MCNC: 109 MCG/DL (ref 40–190)
LYMPHOCYTES # BLD AUTO: 2466 CELLS/UL (ref 850–3900)
LYMPHOCYTES NFR BLD AUTO: 41.1 %
MCH RBC QN AUTO: 30.9 PG (ref 27–33)
MCHC RBC AUTO-ENTMCNC: 33.6 G/DL (ref 32–36)
MCV RBC AUTO: 92.1 FL (ref 80–100)
METHYLMALONATE SERPL-SCNC: 132 NMOL/L (ref 55–335)
MONOCYTES # BLD AUTO: 444 CELLS/UL (ref 200–950)
MONOCYTES NFR BLD AUTO: 7.4 %
NEUTROPHILS # BLD AUTO: 2898 CELLS/UL (ref 1500–7800)
NEUTROPHILS NFR BLD AUTO: 48.3 %
PLATELET # BLD AUTO: 288 THOUSAND/UL (ref 140–400)
PMV BLD REES-ECKER: 11.5 FL (ref 7.5–12.5)
POTASSIUM SERPL-SCNC: 4.3 MMOL/L (ref 3.5–5.3)
PROT SERPL-MCNC: 6.9 G/DL (ref 6.1–8.1)
RBC # BLD AUTO: 4.33 MILLION/UL (ref 3.8–5.1)
SODIUM SERPL-SCNC: 137 MMOL/L (ref 135–146)
TIBC SERPL-MCNC: 246 MCG/DL (CALC) (ref 250–450)
VIT B12 SERPL-MCNC: 383 PG/ML (ref 200–1100)
WBC # BLD AUTO: 6 THOUSAND/UL (ref 3.8–10.8)

## 2024-08-14 ENCOUNTER — OFFICE VISIT (OUTPATIENT)
Dept: HEMATOLOGY ONCOLOGY | Facility: CLINIC | Age: 34
End: 2024-08-14
Payer: COMMERCIAL

## 2024-08-14 VITALS
HEART RATE: 88 BPM | TEMPERATURE: 97.3 F | SYSTOLIC BLOOD PRESSURE: 116 MMHG | HEIGHT: 66 IN | DIASTOLIC BLOOD PRESSURE: 72 MMHG | RESPIRATION RATE: 17 BRPM | WEIGHT: 269 LBS | OXYGEN SATURATION: 99 % | BODY MASS INDEX: 43.23 KG/M2

## 2024-08-14 DIAGNOSIS — E53.8 FOLATE DEFICIENCY: ICD-10-CM

## 2024-08-14 DIAGNOSIS — G25.81 RLS (RESTLESS LEGS SYNDROME): ICD-10-CM

## 2024-08-14 DIAGNOSIS — R79.0 LOW FERRITIN: Primary | ICD-10-CM

## 2024-08-14 DIAGNOSIS — E53.8 VITAMIN B12 DEFICIENCY: ICD-10-CM

## 2024-08-14 PROCEDURE — 99214 OFFICE O/P EST MOD 30 MIN: CPT

## 2024-08-14 RX ORDER — SODIUM CHLORIDE 9 MG/ML
20 INJECTION, SOLUTION INTRAVENOUS ONCE
OUTPATIENT
Start: 2024-08-28

## 2024-08-14 RX ORDER — ACETAMINOPHEN 325 MG/1
650 TABLET ORAL ONCE
Start: 2024-08-28 | End: 2024-08-28

## 2024-08-14 RX ORDER — LANOLIN ALCOHOL/MO/W.PET/CERES
400 CREAM (GRAM) TOPICAL DAILY
Qty: 90 TABLET | Refills: 0 | Status: SHIPPED | OUTPATIENT
Start: 2024-08-14

## 2024-08-14 RX ORDER — METHYLPREDNISOLONE SODIUM SUCCINATE 40 MG/ML
10 INJECTION, POWDER, LYOPHILIZED, FOR SOLUTION INTRAMUSCULAR; INTRAVENOUS ONCE
Status: CANCELLED
Start: 2024-08-28 | End: 2024-08-28

## 2024-08-14 RX ORDER — SODIUM CHLORIDE 9 MG/ML
20 INJECTION, SOLUTION INTRAVENOUS ONCE
Status: CANCELLED | OUTPATIENT
Start: 2024-08-28

## 2024-08-14 RX ORDER — DIPHENHYDRAMINE HYDROCHLORIDE 50 MG/ML
25 INJECTION INTRAMUSCULAR; INTRAVENOUS ONCE
Start: 2024-08-28 | End: 2024-08-28

## 2024-08-14 RX ORDER — DIPHENHYDRAMINE HYDROCHLORIDE 50 MG/ML
25 INJECTION INTRAMUSCULAR; INTRAVENOUS ONCE
Status: CANCELLED
Start: 2024-08-28 | End: 2024-08-28

## 2024-08-14 RX ORDER — ACETAMINOPHEN 325 MG/1
650 TABLET ORAL ONCE
Status: CANCELLED
Start: 2024-08-28 | End: 2024-08-28

## 2024-08-14 RX ORDER — METHYLPREDNISOLONE SODIUM SUCCINATE 40 MG/ML
10 INJECTION, POWDER, LYOPHILIZED, FOR SOLUTION INTRAMUSCULAR; INTRAVENOUS ONCE
Start: 2024-08-28 | End: 2024-08-28

## 2024-08-14 NOTE — PATIENT INSTRUCTIONS
"Patient Education     Good food sources of iron   The Basics   Written by the doctors and editors at [facility]   What is iron? -- Iron is a mineral that your body needs to make \"hemoglobin.\" Hemoglobin is a protein in the blood. It helps red blood cells carry oxygen to all parts of the body.  The amount of iron that you need in your diet depends on your age and sex. Your overall health also plays a role in how much iron you need each day.  Some people do not have enough iron. This is called \"iron deficiency.\" Getting plenty of iron through your diet can help prevent iron deficiency. But if you already have too little iron, eating foods with iron will not be enough to treat it. In this case, your doctor will prescribe extra iron to correct your levels.  What foods are good sources of iron? -- It depends on age:   Babies - It's important to make sure that babies get enough iron, especially if they drink breast milk.   Babies who drink breast milk need extra iron by the time they are 4 months old. This could come from an iron supplement, or solid foods that are high in iron (such as meats or iron-fortified baby cereal).   Babies who do not drink breast milk should drink an \"iron-fortified\" formula. (Formulas labeled \"low-iron\" will not provide enough iron.) Cow's milk and other types of milk do not have the right amount of iron or other nutrients for babies younger than 1 year old.   When a baby starts eating solid foods, include good sources of iron. Examples include meats or iron-fortified baby cereal.   Wait until your baby is at least 1 year old before switching from breast milk or formula to cow's milk or another type of milk.   Children and adults - Eating a healthy, balanced diet will give most people enough iron. Meat is a common source of iron. But if you don't eat meat, you should eat plenty of other foods that are rich in iron. Below are some examples of foods that are considered \"high\" or \"moderate\" in " iron.   High-iron grains - Whole-wheat breads, cereals, and bagels. Flour tortillas, biscuits, English or bran muffins, iron-fortified bran, pretzels, frozen waffles. Hot cereals like oatmeal, cream of wheat, or grits.   Moderate-iron grains - Maranda bread, egg noodles, whole-wheat pasta, hamburger and hot dog buns.   Moderate-iron fruits - Dried apricots, dried figs, prune juice.   High-iron vegetables - Spinach, soybeans, canned pumpkin.   Moderate-iron vegetables - Asparagus, Bradner sprouts, mushrooms, green peas, white or sweet potato with the skin, tomato sauce, beets, beans such as garbanzo or lima. Greens such as jessica, turnip, kale, beet, or Swiss chard.   High-iron meats and other proteins - Beef, veal, lamb, pork, beef or chicken liver, clams, sardines, oysters, shrimp, tofu, baked beans with pork, lentils, tahini, tempeh. Beans such as kidney, lima, navy, or white.   Moderate-iron meats and other proteins - Chicken breast, turkey, eggs, fresh or canned tuna or mackerel.   Other high-iron foods - Pumpkin seeds.   Other moderate-iron foods - Molasses, soy milk, seeds such as sesame or sunflower. Nuts such as almonds, pistachios, cashews, and walnuts.  What else should I know? -- Some medicines and foods can change how much iron you absorb from your food. Talk to your doctor, nurse, or dietitian if you have questions.  All topics are updated as new evidence becomes available and our peer review process is complete.  This topic retrieved from [employer] on: Mar 20, 2024.  Topic 416765 Version 2.0  Release: 32.2.4 - C32.78  © 2024 UpToDate, Inc. and/or its affiliates. All rights reserved.  Consumer Information Use and Disclaimer   Disclaimer: This generalized information is a limited summary of diagnosis, treatment, and/or medication information. It is not meant to be comprehensive and should be used as a tool to help the user understand and/or assess potential diagnostic and treatment options. It does NOT  include all information about conditions, treatments, medications, side effects, or risks that may apply to a specific patient. It is not intended to be medical advice or a substitute for the medical advice, diagnosis, or treatment of a health care provider based on the health care provider's examination and assessment of a patient's specific and unique circumstances. Patients must speak with a health care provider for complete information about their health, medical questions, and treatment options, including any risks or benefits regarding use of medications. This information does not endorse any treatments or medications as safe, effective, or approved for treating a specific patient. UpToDate, Inc. and its affiliates disclaim any warranty or liability relating to this information or the use thereof.The use of this information is governed by the Terms of Use, available at https://www.[employer].com/en/know/clinical-effectiveness-terms. 2024© UpToDate, Inc. and its affiliates and/or licensors. All rights reserved.  Copyright   © 2024 UpToDate, Inc. and/or its affiliates. All rights reserved.

## 2024-08-14 NOTE — PROGRESS NOTES
HEMATOLOGY / ONCOLOGY CLINIC FOLLOW UP NOTE    Primary Care Provider: Riddhi Mackey DO  Referring Provider:    MRN: [id number]  : 1990    Reason for Encounter: Follow-up low ferritin           Interval History: Patient presents for follow-up of her low ferritin.  She is a transfer of care from [facility].  Patient has been following with hematology office since .  She has PMH significant for IBS with constipation and diarrhea, hypothyroidism, anxiety.  She is here with her mother today.  She reports her IBS is under control.  Patient is status post IV iron treatments as she is not able to tolerate oral iron supplements due to significant nausea.  She is taking vitamin D every other day.  She is not taking any multivitamins.  Patient has been focusing on iron rich diet.  Patient was taking vitamin B12 supplements however, she reports she felt hyperalert afterwards and stopped taking the supplement.  Patient reports that she had flulike symptoms for 3 days after her last 3 infusions.  She does not have these symptoms in .    Patient continues to endorse fatigue however, reports it is slightly better prior to the infusions.  In addition, dizziness, lightheadedness is also improved.  Patient continues to have frequent headaches, RLS.  Denies any fevers or infections.    Patient has a monthly menstrual cycle, which lasts about 4 days with 2 days being heavier having to change a pad/tampon every 2 hours.    Labs:  2024: Hgb 13.4, MCV 92.1, WBC 6.0, platelets 288,000, otherwise unremarkable differential, vitamin B12 383, folate 8.5, serum iron 109, iron saturation 44%, ferritin 62     Treatment:  Venofer 200 mg x 6 doses (2022 - 2022)  Venofer 200 mg x 3 doses (2024 - 2024)    REVIEW OF SYSTEMS:  Please note that a 14-point review of systems was performed to include Constitutional, HEENT, Respiratory, CVS, GI, , Musculoskeletal, Integumentary, Neurologic, Rheumatologic,  Endocrinologic, Psychiatric, Lymphatic, and Hematologic/Oncologic systems were reviewed and are negative unless otherwise stated in HPI. Positive and negative findings pertinent to this evaluation are incorporated into the history of present illness.      ECOG PS: 0    Note: From [facility]:  HPI: Nadine Lacey is a 34-year-old female seen in follow-up for low ferritin.  She has no source of chronic blood loss but does have a diagnosis of IBS which could be contributing with the malabsorption component.  She has symptoms that could also be associated due to her thyroid condition and has had a difficult time regulating her Synthroid dosage.  Her hemoglobin has remained within normal limits and no signs of anemia.        04/13/2018:  Hemoglobin 13.1, MCV 96, platelets 271, WBC 4.8  04/17/2019:  Hemoglobin 13 7, MCV 92.6, platelets 273, WBC 4.6  04/28/2020:  Hemoglobin 13.3, MCV 93.5, platelets 266, WBC 5.7  08/16/2021:  Hemoglobin 12.9, MCV 91.7, platelets 254, WBC 4.9  05/16/2022: Ferritin 11, iron saturation 35%, serum iron 95, TIBC 273, vitamin B12 418  07/18/2022:  Hemoglobin 12.8, MCV 90, platelets 272, WBC 4.6              Ferritin 66, iron saturation 76%, TIBC 269, serum iron 198                08/20/2022:  Hemoglobin 12.7, MCV 93.3, platelets 273, WBC 6.7              Ferritin 20, iron saturation 29%, TIBC 288, serum iron 83  9/23/2022: Hemoglobin 13.2, MCV 89.6, platelets 296, WBC 6.1              Ferritin 10, iron saturation 30%, TIBC 293, serum iron 88  IV Venofer 200 mg x6: 11/7-12/13 12/14/2022: Hemoglobin 13.3, MCV 92.6, platelets 305, WBC 6              Ferritin 191, iron saturation 75%, TIBC 259, serum iron 190  7/6/2023: Hemoglobin 14, MCV 91.9, platelets 290, WBC 6              Ferritin 43, iron saturation 43%, TIBC 264, serum iron 113  4/3/2024: Hemoglobin 13.1, MCV 91.2, platelets 302, WBC 5.9              Ferritin 18, iron saturation 3%, TIBC 254, serum iron 85              B12  424, folate 8.4    PROBLEM LIST:  Patient Active Problem List   Diagnosis    Other malaise and fatigue    Class 2 obesity with body mass index (BMI) of 36.0 to 36.9 in adult    IBS (irritable bowel syndrome)    Hypothyroidism    Anxiety    Atypical mole    Acute pain of left shoulder    Low ferritin    Iron deficiency anemia, unspecified    H/O total cystectomy    RLS (restless legs syndrome)       Assessment / Plan: 34-year-old female with low ferritin.  We reviewed there may be a malabsorption for a low ferritin, which could be secondary to IBS or thyroid disease.  At this time, ferritin is 62 however, Iron replacement is suggested in patients with RLS who serum is less than equal to 75ng/ml or iron saturation less than 20%. Oral iron supplements are suggested for patients with ferritin of 75, and IV iron treatment could be considered for patients with ferritin of less than 50 Management of restless legs syndrome and periodic limb movement disorder in adults - [employer]      Since patient is not able to tolerate oral iron supplements, we will set up an IV iron maintenance plan with Venofer 200 mg plus premedications (Solu-Medrol 10 mg, Pepcid 20 mg, Benadryl 25 mg, Tylenol 650 mg) due to her flulike symptoms after each infusion last time.  Her goal is to keep her ferritin above 75.  Recommend she take half of the B12 supplements she has and folic acid 400 mcg as her counts are on the low end of normal.  Recommend patient informs me if she is not able to tolerate.  Patient is agreeable with this plan.  We will see her in 6 months with labs prior (CBC, iron panel, vitamin B12, folate).  Patient aware to contact us for any additional questions/concerns or worsening symptoms.      I spent 35 minutes on chart review, face to face counseling time, coordination of care and documentation.    Past Medical History:   has a past medical history of Anxiety, Constipation, Depression, Diarrhea, Disease of thyroid gland, GERD  (gastroesophageal reflux disease), Hypothyroidism, IBS (irritable bowel syndrome), Nausea, Panic attacks, Rash, and Varicella.    PAST SURGICAL HISTORY:   has a past surgical history that includes [place] tooth extraction; pr laps abd prtm&omentum dx w/wo spec br/wa spx (N/A, 5/13/2024); and pr hysteroscopy bx endometrium&/polypc w/wo d&c (N/A, 5/13/2024).    CURRENT MEDICATIONS  Current Outpatient Medications   Medication Sig Dispense Refill    Cholecalciferol 1.25 MG (78834 UT) TABS Take 1 tablet (50,000 Units total) by mouth every 7 days x12wks and then take OTC Vit D 4000IU QD (Patient taking differently: Take 5,000 Units by mouth daily od) 12 tablet 0    ferrous sulfate 325 (65 Fe) mg tablet Take by mouth daily with breakfast      fluticasone (FLONASE) 50 mcg/act nasal spray 1 spray into each nostril daily (Patient taking differently: 1 spray into each nostril as needed) 11.1 mL 0    naproxen (NAPROSYN) 500 mg tablet Take 1 tablet (500 mg total) by mouth 2 (two) times a day with meals (Patient taking differently: Take 500 mg by mouth as needed) 28 tablet 0    Synthroid 137 MCG tablet Take 137 mcg by mouth every morning       No current facility-administered medications for this visit.     [unfilled]    SOCIAL HISTORY:   reports that she has never smoked. She has never used smokeless tobacco. She reports that she does not currently use alcohol. She reports that she does not use drugs.     FAMILY HISTORY:  family history includes Allergies in her mother; Cancer in her paternal grandfather; Depression in her brother; Diabetes in her father; Hip dysplasia in her sister; Hyperlipidemia in her father; Hypothyroidism in her mother; Lung cancer in her paternal grandfather; Osteoporosis in her paternal grandfather; Personality disorder in her sister; Prostate cancer in her paternal grandfather; Seizures in her sister; Skin cancer in her paternal grandfather; Stroke in her paternal grandmother; Substance Abuse in her  "brother; Thyroid disease in her mother.     ALLERGIES:  is allergic to other and strawberry c [ascorbate - food allergy].      Physical Exam:  Vital Signs:   Visit Vitals  /72 (BP Location: Left arm, Patient Position: Sitting, Cuff Size: Adult)   Pulse 88   Temp (!) 97.3 °F (36.3 °C)   Resp 17   Ht 5' 6\" (1.676 m)   Wt 122 kg (269 lb)   SpO2 99%   BMI 43.42 kg/m²   OB Status Unknown   Smoking Status Never   BSA 2.27 m²     Body mass index is 43.42 kg/m².  Body surface area is 2.27 meters squared.    GEN: Alert, awake oriented x3, in no acute distress  HEENT- No pallor, icterus, cyanosis, no oral mucosal lesions,   LAD - no palpable cervical, clavicle, axillary, inguinal LAD  Heart- normal S1 S2, regular rate and rhythm, No murmur, rubs.   Lungs- clear breathing sound bilateral.   Abdomen- soft, Non tender, bowel sounds present  Extremities- No cyanosis, clubbing, edema  Neuro- No focal neurological deficit    Labs:  Lab Results   Component Value Date    WBC 6.0 07/30/2024    HGB 13.4 07/30/2024    HCT 39.9 07/30/2024    MCV 92.1 07/30/2024     07/30/2024     Lab Results   Component Value Date    SODIUM 137 07/30/2024    K 4.3 07/30/2024     07/30/2024    CO2 26 07/30/2024    AGAP 4 04/13/2018    BUN 9 07/30/2024    CREATININE 0.69 07/30/2024    GLUC 86 07/30/2024    GLUF 82 04/13/2018    CALCIUM 9.3 07/30/2024    AST 11 07/30/2024    ALT 8 07/30/2024    ALKPHOS 67 07/30/2024    TP 6.9 07/30/2024    TBILI 0.6 07/30/2024    EGFR 118 07/30/2024     "

## 2024-08-21 ENCOUNTER — TELEPHONE (OUTPATIENT)
Age: 34
End: 2024-08-21

## 2024-09-17 ENCOUNTER — HOSPITAL ENCOUNTER (OUTPATIENT)
Dept: INFUSION CENTER | Facility: CLINIC | Age: 34
Discharge: HOME | End: 2024-09-17
Payer: COMMERCIAL

## 2024-09-17 VITALS
OXYGEN SATURATION: 99 % | SYSTOLIC BLOOD PRESSURE: 136 MMHG | DIASTOLIC BLOOD PRESSURE: 77 MMHG | TEMPERATURE: 97.6 F | RESPIRATION RATE: 18 BRPM | HEART RATE: 87 BPM

## 2024-09-17 DIAGNOSIS — R79.0 LOW FERRITIN: ICD-10-CM

## 2024-09-17 DIAGNOSIS — G25.81 RLS (RESTLESS LEGS SYNDROME): Primary | ICD-10-CM

## 2024-09-17 PROCEDURE — 96367 TX/PROPH/DG ADDL SEQ IV INF: CPT

## 2024-09-17 PROCEDURE — 96365 THER/PROPH/DIAG IV INF INIT: CPT

## 2024-09-17 PROCEDURE — 96375 TX/PRO/DX INJ NEW DRUG ADDON: CPT

## 2024-09-17 RX ORDER — METHYLPREDNISOLONE SODIUM SUCCINATE 40 MG/ML
10 INJECTION, POWDER, LYOPHILIZED, FOR SOLUTION INTRAMUSCULAR; INTRAVENOUS ONCE
Status: COMPLETED | OUTPATIENT
Start: 2024-09-17 | End: 2024-09-17

## 2024-09-17 RX ORDER — SODIUM CHLORIDE 9 MG/ML
20 INJECTION, SOLUTION INTRAVENOUS ONCE
Status: COMPLETED | OUTPATIENT
Start: 2024-09-17 | End: 2024-09-17

## 2024-09-17 RX ORDER — METHYLPREDNISOLONE SODIUM SUCCINATE 40 MG/ML
10 INJECTION, POWDER, LYOPHILIZED, FOR SOLUTION INTRAMUSCULAR; INTRAVENOUS ONCE
Start: 2025-03-04 | End: 2025-03-04

## 2024-09-17 RX ORDER — ACETAMINOPHEN 325 MG/1
650 TABLET ORAL ONCE
Status: COMPLETED | OUTPATIENT
Start: 2024-09-17 | End: 2024-09-17

## 2024-09-17 RX ORDER — DIPHENHYDRAMINE HYDROCHLORIDE 50 MG/ML
25 INJECTION INTRAMUSCULAR; INTRAVENOUS ONCE
Start: 2025-03-04 | End: 2025-03-04

## 2024-09-17 RX ORDER — DIPHENHYDRAMINE HYDROCHLORIDE 50 MG/ML
25 INJECTION INTRAMUSCULAR; INTRAVENOUS ONCE
Status: COMPLETED | OUTPATIENT
Start: 2024-09-17 | End: 2024-09-17

## 2024-09-17 RX ORDER — ACETAMINOPHEN 325 MG/1
650 TABLET ORAL ONCE
Start: 2025-03-04 | End: 2025-03-04

## 2024-09-17 RX ORDER — SODIUM CHLORIDE 9 MG/ML
20 INJECTION, SOLUTION INTRAVENOUS ONCE
OUTPATIENT
Start: 2025-03-04

## 2024-09-17 RX ADMIN — DIPHENHYDRAMINE HYDROCHLORIDE 25 MG: 50 INJECTION, SOLUTION INTRAMUSCULAR; INTRAVENOUS at 09:17

## 2024-09-17 RX ADMIN — FAMOTIDINE 20 MG: 10 INJECTION INTRAVENOUS at 08:49

## 2024-09-17 RX ADMIN — IRON SUCROSE 200 MG: 20 INJECTION, SOLUTION INTRAVENOUS at 09:20

## 2024-09-17 RX ADMIN — SODIUM CHLORIDE 20 ML/HR: 0.9 INJECTION, SOLUTION INTRAVENOUS at 08:52

## 2024-09-17 RX ADMIN — ACETAMINOPHEN 650 MG: 325 TABLET ORAL at 08:56

## 2024-09-17 RX ADMIN — METHYLPREDNISOLONE SODIUM SUCCINATE 10 MG: 40 INJECTION, POWDER, FOR SOLUTION INTRAMUSCULAR; INTRAVENOUS at 09:19

## 2024-09-17 NOTE — PROGRESS NOTES
Pt tolerated venofer, offers no complaints, Iv removed, pt to make next treatment on way out, declined AVS

## 2024-11-01 ENCOUNTER — NURSE TRIAGE (OUTPATIENT)
Age: 34
End: 2024-11-01

## 2024-11-01 NOTE — TELEPHONE ENCOUNTER
"Incoming call from patient - discovered left breast mass 2 weeks ago. Is painful to the touch. Located on underside of left breast. Denies skin changes, redness, nipple changes, nipple discharge, fever, chills. States mass is smaller than a quarter in size.     Appointments reviewed, none available within appropriate time frame.     Warm transfer to [place] in the office for assistance with scheduling.     Reason for Disposition   Breast lump    Answer Assessment - Initial Assessment Questions  1. SYMPTOM: \"What's the main symptom you're concerned about?\"  (e.g., lump, nipple discharge, pain, rash )      Left breast mass - a little smaller than a quarter. Painful to touch.  2. LOCATION: \"Where is the symptoms located?\"      Underside of left breast  3. ONSET: \"When did symptoms  start?\"      2 weeks ago  4. PRIOR HISTORY: \"Do you have any history of prior problems with your breasts?\" (e.g., breast cancer, breast implant, fibrocystic breast disease)      denies  5. CAUSE: \"What do you think is causing this symptom?\"      unknown  6. OTHER SYMPTOMS: \"Do you have any other symptoms?\" (e.g., fever, breast pain, nipple discharge, redness or rash)      denies  7. PREGNANCY-BREASTFEEDING: \"Is there any chance you are pregnant?\" \"When was your last menstrual period?\" \"Are you breastfeeding?\"      Denies    Protocols used: Breast Symptoms-Adult-[place]    "

## 2024-11-05 ENCOUNTER — OFFICE VISIT (OUTPATIENT)
Dept: OBGYN CLINIC | Facility: CLINIC | Age: 34
End: 2024-11-05
Payer: COMMERCIAL

## 2024-11-05 VITALS
HEIGHT: 66 IN | BODY MASS INDEX: 42.43 KG/M2 | SYSTOLIC BLOOD PRESSURE: 118 MMHG | WEIGHT: 264 LBS | DIASTOLIC BLOOD PRESSURE: 80 MMHG

## 2024-11-05 DIAGNOSIS — N63.24 MASS OF LOWER INNER QUADRANT OF LEFT BREAST: Primary | ICD-10-CM

## 2024-11-05 PROCEDURE — 99213 OFFICE O/P EST LOW 20 MIN: CPT | Performed by: OBSTETRICS & GYNECOLOGY

## 2024-11-05 NOTE — PROGRESS NOTES
Assessment/Plan    Diagnoses and all orders for this visit:    Mass of lower inner quadrant of left breast  -     US breast left limited (diagnostic); Future  -     Mammo diagnostic left w 3d and cad; Future              Subjective  Chief Complaint   Patient presents with    Breast Problem     Pt c/o left breast lump          Nadine Lacey is a 34 y.o.  adult here for a problem visit. Noticed a left breast lump about 2 weeks ago. Tender to touch. She was having some pain in that pain.      Patient Active Problem List   Diagnosis    Other malaise and fatigue    Class 2 obesity with body mass index (BMI) of 36.0 to 36.9 in adult    IBS (irritable bowel syndrome)    Hypothyroidism    Anxiety    Atypical mole    Acute pain of left shoulder    Low ferritin    Iron deficiency anemia, unspecified    H/O total cystectomy    RLS (restless legs syndrome)    Folate deficiency         Gynecologic History  Patient's last menstrual period was 2024 (exact date).  Contraception: none  Last Pap:  NILM     Obstetric History  OB History    Para Term  AB Living   0 0 0 0 0 0   SAB IAB Ectopic Multiple Live Births   0 0 0 0 0   Obstetric Comments   Menarche 14        The following portions of the patient's history were reviewed and updated as appropriate: allergies, current medications, past family history, past medical history, past social history, past surgical history, and problem list.    Allergies  Other and Strawberry c [ascorbate - food allergy]    Medications    Current Outpatient Medications:     Cholecalciferol 1.25 MG (87466 UT) TABS, Take 1 tablet (50,000 Units total) by mouth every 7 days x12wks and then take OTC Vit D 4000IU QD (Patient taking differently: Take 5,000 Units by mouth daily od), Disp: 12 tablet, Rfl: 0    ferrous sulfate 325 (65 Fe) mg tablet, Take by mouth daily with breakfast, Disp: , Rfl:     fluticasone (FLONASE) 50 mcg/act nasal spray, 1 spray into each nostril daily  "(Patient taking differently: 1 spray into each nostril as needed), Disp: 11.1 mL, Rfl: 0    folic acid (FOLVITE) 400 mcg tablet, Take 1 tablet (400 mcg total) by mouth daily, Disp: 90 tablet, Rfl: 0    naproxen (NAPROSYN) 500 mg tablet, Take 1 tablet (500 mg total) by mouth 2 (two) times a day with meals (Patient taking differently: Take 500 mg by mouth as needed), Disp: 28 tablet, Rfl: 0    Synthroid 137 MCG tablet, Take 137 mcg by mouth every morning, Disp: , Rfl:       Review of Systems  Review of Systems   Constitutional:  Negative for chills and fever.          Objective     /80 (BP Location: Left arm, Patient Position: Sitting, Cuff Size: Large)   Ht 5' 6\" (1.676 m)   Wt 120 kg (264 lb)   LMP 11/03/2024 (Exact Date)   BMI 42.61 kg/m²       Physical Exam  Constitutional:       General: She is not in acute distress.     Appearance: Normal appearance. She is well-developed. She is not ill-appearing.   Genitourinary:   Breasts:     Right: Normal. No inverted nipple, mass, nipple discharge, skin change or tenderness.      Left: Mass and tenderness present. No inverted nipple, nipple discharge or skin change.   Pulmonary:      Effort: Pulmonary effort is normal. No respiratory distress.   Chest:       Neurological:      General: No focal deficit present.      Mental Status: She is alert and oriented to person, place, and time.   Psychiatric:         Mood and Affect: Mood normal.         Behavior: Behavior normal.         Thought Content: Thought content normal.         Judgment: Judgment normal.   Vitals and nursing note reviewed.               "

## 2024-11-16 DIAGNOSIS — E53.8 FOLATE DEFICIENCY: ICD-10-CM

## 2024-11-18 RX ORDER — FOLIC ACID 0.4 MG
400 TABLET ORAL DAILY
Qty: 30 TABLET | Refills: 2 | Status: SHIPPED | OUTPATIENT
Start: 2024-11-18

## 2024-12-04 ENCOUNTER — HOSPITAL ENCOUNTER (OUTPATIENT)
Dept: MAMMOGRAPHY | Facility: CLINIC | Age: 34
Discharge: HOME | End: 2024-12-04
Payer: COMMERCIAL

## 2024-12-04 ENCOUNTER — HOSPITAL ENCOUNTER (OUTPATIENT)
Dept: ULTRASOUND IMAGING | Facility: CLINIC | Age: 34
Discharge: HOME | End: 2024-12-04
Payer: COMMERCIAL

## 2024-12-04 VITALS — WEIGHT: 264 LBS | BODY MASS INDEX: 42.43 KG/M2 | HEIGHT: 66 IN

## 2024-12-04 DIAGNOSIS — N63.24 MASS OF LOWER INNER QUADRANT OF LEFT BREAST: ICD-10-CM

## 2024-12-04 PROCEDURE — 76642 ULTRASOUND BREAST LIMITED: CPT

## 2024-12-04 PROCEDURE — 77066 DX MAMMO INCL CAD BI: CPT

## 2024-12-04 PROCEDURE — G0279 TOMOSYNTHESIS, MAMMO: HCPCS

## 2024-12-09 ENCOUNTER — RESULTS FOLLOW-UP (OUTPATIENT)
Dept: OBGYN CLINIC | Facility: CLINIC | Age: 34
End: 2024-12-09

## 2024-12-11 ENCOUNTER — OFFICE VISIT (OUTPATIENT)
Age: 34
End: 2024-12-11
Payer: COMMERCIAL

## 2024-12-11 VITALS
RESPIRATION RATE: 18 BRPM | TEMPERATURE: 98.4 F | OXYGEN SATURATION: 99 % | HEART RATE: 113 BPM | HEIGHT: 66 IN | WEIGHT: 264 LBS | BODY MASS INDEX: 42.43 KG/M2

## 2024-12-11 DIAGNOSIS — L71.0 PERIORIFICIAL DERMATITIS: ICD-10-CM

## 2024-12-11 DIAGNOSIS — L30.1 DYSHIDROTIC ECZEMA: Primary | ICD-10-CM

## 2024-12-11 DIAGNOSIS — L85.3 XEROSIS OF SKIN: ICD-10-CM

## 2024-12-11 DIAGNOSIS — D22.9 MULTIPLE MELANOCYTIC NEVI: ICD-10-CM

## 2024-12-11 PROCEDURE — 99204 OFFICE O/P NEW MOD 45 MIN: CPT | Performed by: STUDENT IN AN ORGANIZED HEALTH CARE EDUCATION/TRAINING PROGRAM

## 2024-12-11 RX ORDER — CLOBETASOL PROPIONATE 0.5 MG/G
OINTMENT TOPICAL 2 TIMES DAILY
Qty: 60 G | Refills: 3 | Status: SHIPPED | OUTPATIENT
Start: 2024-12-11

## 2024-12-11 RX ORDER — DOXYCYCLINE HYCLATE 20 MG
20 TABLET ORAL 2 TIMES DAILY
Qty: 180 TABLET | Refills: 0 | Status: SHIPPED | OUTPATIENT
Start: 2024-12-11 | End: 2025-03-11

## 2024-12-11 NOTE — PROGRESS NOTES
"[facility] Note     Patient Name: Nadine Lacey  Encounter Date: 12/11/24     Have you been cared for by a [facility] Dermatologist in the last 3 years and, if so, which description applies to you?    NO.   I am considered a \"new\" patient and must complete all patient intake questions. I am FEMALE/of child-bearing potential.    REVIEW OF SYSTEMS:  Have you recently had or currently have any of the following? Recent fever or chills? No  Any non-healing wound? No  Are you pregnant or planning to become pregnant? No  Are you currently or planning to be nursing or breast feeding? No   PAST MEDICAL HISTORY:  Have you personally ever had or currently have any of the following?  If \"YES,\" then please provide more detail. Skin cancer (such as Melanoma, Basal Cell Carcinoma, Squamous Cell Carcinoma?  No  Tuberculosis, HIV/AIDS, Hepatitis B or C: No  Radiation Treatment No   HISTORY OF IMMUNOSUPPRESSION:   Do you have a history of any of the following:  Systemic Immunosuppression such as Diabetes, Biologic or Immunotherapy, Chemotherapy, Organ Transplantation, Bone Marrow Transplantation or Prednsione?  No    Answering \"YES\" requires the addition of the dotphrase \"IMMUNOSUPPRESSED\" as the first diagnosis of the patient's visit.   FAMILY HISTORY:  Any \"first degree relatives\" (parent, brother, sister, or child) with the following?    Skin Cancer, Pancreatic or Other Cancer? YES, paternal grandfather-lung, brain, prostate,skin etc   PATIENT EXPERIENCE:    Do you want the Dermatologist to perform a COMPLETE skin exam today including a clinical examination under the \"bra and underwear\" areas?  Yes  If necessary, do we have your permission to call and leave a detailed message on your Preferred Phone number that includes your specific medical information?  Yes      Allergies   Allergen Reactions   • Other Shortness Of Breath     almonds   • Strawberry C [Ascorbate - Food Allergy] GI Intolerance      Current " Outpatient Medications:   •  clobetasol (TEMOVATE) 0.05 % ointment, Apply topically 2 (two) times a day To affected areas of the hands only., Disp: 60 g, Rfl: 3  •  doxycycline (PERIOSTAT) 20 MG tablet, Take 1 tablet (20 mg total) by mouth 2 (two) times a day, Disp: 180 tablet, Rfl: 0  •  Cholecalciferol 1.25 MG (62468 UT) TABS, Take 1 tablet (50,000 Units total) by mouth every 7 days x12wks and then take OTC Vit D 4000IU QD (Patient taking differently: Take 5,000 Units by mouth daily od), Disp: 12 tablet, Rfl: 0  •  ferrous sulfate 325 (65 Fe) mg tablet, Take by mouth daily with breakfast, Disp: , Rfl:   •  fluticasone (FLONASE) 50 mcg/act nasal spray, 1 spray into each nostril daily (Patient taking differently: 1 spray into each nostril as needed), Disp: 11.1 mL, Rfl: 0  •  folic acid (FOLVITE) 400 mcg tablet, TAKE 1 TABLET BY MOUTH DAILY., Disp: 30 tablet, Rfl: 2  •  naproxen (NAPROSYN) 500 mg tablet, Take 1 tablet (500 mg total) by mouth 2 (two) times a day with meals (Patient taking differently: Take 500 mg by mouth as needed), Disp: 28 tablet, Rfl: 0  •  Synthroid 137 MCG tablet, Take 137 mcg by mouth every morning, Disp: , Rfl:           Whom besides the patient is providing clinical information about today's encounter?   NO ADDITIONAL HISTORIAN (patient alone provided history)    Physical Exam and Assessment/Plan by Diagnosis:    PERIORIFICIAL DERMATITIS    Physical Exam:  Anatomic Location Affected:  cheeks and chin   Morphological Description:  scattered red papules around the mouth  Pertinent Positives:  Pertinent Negatives:    Additional History of Present Condition:  present for a few months. Started on right cheek and spread according to patient. States sometimes it gets itchy. Currently using over the counter creams     Assessment and Plan:  Based on a thorough discussion of this condition and the management approach to it (including a comprehensive discussion of the known risks, side effects and  potential benefits of treatment), the patient (family) agrees to implement the following specific plan:    Prescribed Doxycyline 20 mg to take twice a day   Discussed possible triggers to bring on periorificial dermatitis   Recommend not using over the counter steroids   Follow up in 3 months     What is periorificial dermatitis?  Periorificial dermatitis is a common facial skin problem characterized by groups of itchy or tender small red bumps. It is given this name because the bumps occur around the eyes, the nostrils, the mouth and occasionally, the genitals.  The more restrictive term, perioral dermatitis, is often used when the eruption is confined to the skin in the lower half of the face, particularly around the mouth. Periocular dermatitis may be used to describe the rash affecting the eyelids.  Periorificial dermatitis mainly affects adult women aged 15 to 45 years. It is less common in men. It can also affect children of any age.    What is the cause of periorificial or periorificial dermatitis?  The exact cause of periorificial is not understood. Periorificial dermatitis may be related to:  Skin barrier dysfunction  Activation of the body's immune system  Altered bacterial levels on the skin  Bacteria from hair follicles    Unlike seborrheic dermatitis which can affect similar areas of the face, malassezia yeasts are not involved in periorificial dermatitis.  Periorificial dermatitis may be directly caused by:  Topical steroids, whether applied deliberately to facial skin or inadvertently  Nasal steroids, steroid inhalers, and oral steroids  Cosmetic creams, make-ups and sunscreens  Fluorinated toothpaste  Neglecting to wash the face  Hormonal changes and/or oral contraceptives    What are the symptoms of periorificial dermatitis?  The characteristics of facial periorificial dermatitis are:  Eruption on the chin, upper lip and eyelids   Sparing of the skin bordering the lips (which then appears pale),  eyelids, nostrils  Clusters of 1-2 mm red bumps, potentially with pus  Dry and flaky skin surface  Burning irritation    In contrast to steroid-induced rosacea, periorificial dermatitis spares the cheeks and forehead.  Genital periorificial dermatitis has a similar clinical appearance. It involves the skin on and around labia majora (in females), scrotum (in males) and anus.    Granulomatous periorificial dermatitis is a variant of periorificial dermatitis that presents with persistent yellowish bumps. It occurs mainly in young children and nearly always follows the use of a corticosteroid.   Rebound flare of severe periorificial dermatitis may occur after abrupt cessation of application of potent topical steroid to facial skin.  The presentation of periorificial dermatitis is usually typical, so diagnosis can be made by history and skin exam. There are no specific tests.  Skin biopsy may occasionally be taken, and show similar findings to rosacea.     Periorificial dermatitis responds well to treatment, although it may take several weeks before there is a noticeable improvement.    General measures  Discontinue applying all face creams including topical steroids, cosmetics and sunscreens (zero therapy).  Consider a slower withdrawal from topical steroid/face creams if there is a severe flare after steroid cessation. Temporarily, replace it by a less potent or less occlusive cream or apply it less and less frequently until it is no longer required.  Wash the face with warm water alone while the rash is present. When it has cleared up, use a non-soap bar or liquid cleanser if you wish.  Choose a liquid or gel sunscreen.    Topical therapy: used to treat mild periorificial dermatitis. Choices include:  Erythromycin  Clindamycin  Metronidazole  Pimecrolimus  Azelaic acid    Oral therapy: in more severe cases, a course of oral antibiotics may be prescribed for 6-12 weeks.  Most often, a tetracycline such as doxycycline  is recommended. A sub-antimicrobial dose may be sufficient.  Oral erythromycin is used during pregnancy and in pre-pubertal children.  Oral low-dose isotretinoin may be used if antibiotics are ineffective or contraindicated.    Periorificial dermatitis can generally be prevented by the avoidance of topical steroids and occlusive face creams. When topical steroids are necessary to treat an inflammatory facial rash, they should be applied accurately to the affected area, no more than once daily in the lowest effective potency, and discontinued as soon as the rash responds.   Periorificial dermatitis sometimes recurs when the antibiotics are discontinued, or at a later date. The same treatment can be used again.    DYSHIDROTIC ECZEMA      Physical Exam:  (Anatomic Location); (Size and Morphological Description); (Differential Diagnosis):  Small, tense, clear vesicals on sides of the fingers & hands    Pertinent Positives:  Pertinent Negatives:    Additional History of Present Condition:  currently uses over the counter creams to help keep areas moist     Assessment and Plan:  Based on a thorough discussion of this condition and the management approach to it (including a comprehensive discussion of the known risks, side effects and potential benefits of treatment), the patient (family) agrees to implement the following specific plan:  Prescribed clobetasol 0.05% ointment twice a day for about 2 weeks then apply as needed to only affected areas   Monitor areas for any changes and let office know if condition worsens or doesn't improve           What is dyshidrotic eczema?         Dyshidrotic eczema is a type of eczema that affects mostly the hands and sometimes the feet.  It causes small, itchy blisters along the sides of the fingers.  .  It can be brought on by using strong soaps or , from excess contact with water, as an allergic reaction to something getting on the hands, as an allergic reaction to a fungal  "infection elsewhere (usually the feet) or from stress   Flares are treated with strong topical steroids.  Patch testing can be done to rule out an allergic reaction.  Using gloves and moisturizers to avoid irritation of the skin are important in preventing flares.        MELANOCYTIC NEVI (\"Moles\")    Physical Exam:  Anatomic Location Affected:   Mostly on sun-exposed areas of the trunk and extremities  Morphological Description:  Scattered, 1-4mm round to ovoid, symmetrical-appearing, even bordered, skin colored to dark brown macules/papules, mostly in sun-exposed areas  Pertinent Positives:  Pertinent Negatives:    Additional History of Present Condition:      Assessment and Plan:  Based on a thorough discussion of this condition and the management approach to it (including a comprehensive discussion of the known risks, side effects and potential benefits of treatment), the patient (family) agrees to implement the following specific plan:  When outside we recommend using a wide brim hat, sunglasses, long sleeve and pants, sunscreen with SPF 30+ with reapplication every 2 hours, or SPF specific clothing   Benign, reassured  Annual skin check     Melanocytic Nevi  Melanocytic nevi (\"moles\") are tan or brown, raised or flat areas of the skin which have an increased number of melanocytes. Melanocytes are the cells in our body which make pigment and account for skin color.    Some moles are present at birth (I.e., \"congenital nevi\"), while others come up later in life (i.e., \"acquired nevi\").  The sun can stimulate the body to make more moles.  Sunburns are not the only thing that triggers more moles.  Chronic sun exposure can do it too.     Clinically distinguishing a healthy mole from melanoma may be difficult, even for experienced dermatologists. The \"ABCDE's\" of moles have been suggested as a means of helping to alert a person to a suspicious mole and the possible increased risk of melanoma.  The suggestions for " "raising alert are as follows:    Asymmetry: Healthy moles tend to be symmetric, while melanomas are often asymmetric.  Asymmetry means if you draw a line through the mole, the two halves do not match in color, size, shape, or surface texture. Asymmetry can be a result of rapid enlargement of a mole, the development of a raised area on a previously flat lesion, scaling, ulceration, bleeding or scabbing within the mole.  Any mole that starts to demonstrate \"asymmetry\" should be examined promptly by a board certified dermatologist.     Border: Healthy moles tend to have discrete, even borders.  The border of a melanoma often blends into the normal skin and does not sharply delineate the mole from normal skin.  Any mole that starts to demonstrate \"uneven borders\" should be examined promptly by a board certified dermatologist.     Color: Healthy moles tend to be one color throughout.  Melanomas tend to be made up of different colors ranging from dark black, blue, white, or red.  Any mole that demonstrates a color change should be examined promptly by a board certified dermatologist.     Diameter: Healthy moles tend to be smaller than 0.6 cm in size; an exception are \"congenital nevi\" that can be larger.  Melanomas tend to grow and can often be greater than 0.6 cm (1/4 of an inch, or the size of a pencil eraser). This is only a guideline, and many normal moles may be larger than 0.6 cm without being unhealthy.  Any mole that starts to change in size (small to bigger or bigger to smaller) should be examined promptly by a board certified dermatologist.     Evolving: Healthy moles tend to \"stay the same.\"  Melanomas may often show signs of change or evolution such as a change in size, shape, color, or elevation.  Any mole that starts to itch, bleed, crust, burn, hurt, or ulcerate or demonstrate a change or evolution should be examined promptly by a board certified dermatologist.      XEROSIS (\"DRY SKIN\")    Physical " Exam:  Anatomic Location Affected:  diffuse  Morphological Description:  xerosis  Pertinent Positives:  Pertinent Negatives:    Additional History of Present Condition:      Assessment and Plan:  Based on a thorough discussion of this condition and the management approach to it (including a comprehensive discussion of the known risks, side effects and potential benefits of treatment), the patient (family) agrees to implement the following specific plan:  Use moisturizer like Eucerin,Cerave or Aveeno Cream 3 times a day for the dry skin            Dry skin refers to skin that feels dry to touch. Dry skin has a dull surface with a rough, scaly quality. The skin is less pliable and cracked. When dryness is severe, the skin may become inflamed and fissured.  Although any body site can be dry, dry skin tends to affect the shins more than any other site.    Dry skin is lacking moisture in the outer horny cell layer (stratum corneum) and this results in cracks in the skin surface.  Dry skin is also called xerosis, xeroderma or asteatosis (lack of fat).  It can affect males and females of all ages. There is some racial variability in water and lipid content of the skin.  Dry skin that starts in early childhood may be one of about 20 types of ichthyosis (fish-scale skin). There is often a family history of dry skin.   Dry skin is commonly seen in people with atopic dermatitis.  Nearly everyone > 60 years has dry skin.    Dry skin that begins later may be seen in people with certain diseases and conditions.  Postmenopausal women  Hypothyroidism  Chronic renal disease   Malnutrition and weight loss   Subclinical dermatitis   Treatment with certain drugs such as oral retinoids, diuretics and epidermal growth factor receptor inhibitors      What is the treatment for dry skin?  The mainstay of treatment of dry skin and ichthyosis is moisturisers/emollients. They should be applied liberally and often enough to:  Reduce itch    Improve the barrier function   Prevent entry of irritants, bacteria   Reduce transepidermal water loss.      How can dry skin be prevented?  Eliminate aggravating factors:  Reduce the frequency of bathing.   A humidifier in winter and air conditioner in summer   Compare having a short shower with a prolonged soak in a bath.   Use lukewarm, not hot, water.   Replace standard soap with a substitute such as a synthetic detergent cleanser, water-miscible emollient, bath oil, anti-pruritic tar oil, colloidal oatmeal etc.   Apply an emollient liberally and often, particularly shortly after bathing, and when itchy. The drier the skin, the thicker this should be, especially on the hands.    What is the outlook for dry skin?  A tendency to dry skin may persist life-long, or it may improve once contributing factors are controlled.     Scribe Attestation    I,:  Renata Lopez MA am acting as a scribe while in the presence of the attending physician.:       I,:  Ming Clemens DO personally performed the services described in this documentation    as scribed in my presence.:

## 2024-12-11 NOTE — PATIENT INSTRUCTIONS
PERIORIFICIAL DERMATITIS    Assessment and Plan:  Based on a thorough discussion of this condition and the management approach to it (including a comprehensive discussion of the known risks, side effects and potential benefits of treatment), the patient (family) agrees to implement the following specific plan:  Discussed topicals VS antibiotics  Prescribed Doxycyline 20 mg to take twice a day   Discussed possible triggers to bring on periorificial dermatitis   Recommend not using over the counter steroids   Follow up in 3 months     What is periorificial dermatitis?  Periorificial dermatitis is a common facial skin problem characterized by groups of itchy or tender small red bumps. It is given this name because the bumps occur around the eyes, the nostrils, the mouth and occasionally, the genitals.  The more restrictive term, perioral dermatitis, is often used when the eruption is confined to the skin in the lower half of the face, particularly around the mouth. Periocular dermatitis may be used to describe the rash affecting the eyelids.  Periorificial dermatitis mainly affects adult women aged 15 to 45 years. It is less common in men. It can also affect children of any age.    What is the cause of periorificial or periorificial dermatitis?  The exact cause of periorificial is not understood. Periorificial dermatitis may be related to:  Skin barrier dysfunction  Activation of the body's immune system  Altered bacterial levels on the skin  Bacteria from hair follicles    Unlike seborrheic dermatitis which can affect similar areas of the face, malassezia yeasts are not involved in periorificial dermatitis.  Periorificial dermatitis may be directly caused by:  Topical steroids, whether applied deliberately to facial skin or inadvertently  Nasal steroids, steroid inhalers, and oral steroids  Cosmetic creams, make-ups and sunscreens  Fluorinated toothpaste  Neglecting to wash the face  Hormonal changes and/or oral  contraceptives    What are the symptoms of periorificial dermatitis?  The characteristics of facial periorificial dermatitis are:  Eruption on the chin, upper lip and eyelids   Sparing of the skin bordering the lips (which then appears pale), eyelids, nostrils  Clusters of 1-2 mm red bumps, potentially with pus  Dry and flaky skin surface  Burning irritation    In contrast to steroid-induced rosacea, periorificial dermatitis spares the cheeks and forehead.  Genital periorificial dermatitis has a similar clinical appearance. It involves the skin on and around labia majora (in females), scrotum (in males) and anus.    Granulomatous periorificial dermatitis is a variant of periorificial dermatitis that presents with persistent yellowish bumps. It occurs mainly in young children and nearly always follows the use of a corticosteroid.   Rebound flare of severe periorificial dermatitis may occur after abrupt cessation of application of potent topical steroid to facial skin.  The presentation of periorificial dermatitis is usually typical, so diagnosis can be made by history and skin exam. There are no specific tests.  Skin biopsy may occasionally be taken, and show similar findings to rosacea.     Periorificial dermatitis responds well to treatment, although it may take several weeks before there is a noticeable improvement.    General measures  Discontinue applying all face creams including topical steroids, cosmetics and sunscreens (zero therapy).  Consider a slower withdrawal from topical steroid/face creams if there is a severe flare after steroid cessation. Temporarily, replace it by a less potent or less occlusive cream or apply it less and less frequently until it is no longer required.  Wash the face with warm water alone while the rash is present. When it has cleared up, use a non-soap bar or liquid cleanser if you wish.  Choose a liquid or gel sunscreen.    Topical therapy: used to treat mild periorificial  dermatitis. Choices include:  Erythromycin  Clindamycin  Metronidazole  Pimecrolimus  Azelaic acid    Oral therapy: in more severe cases, a course of oral antibiotics may be prescribed for 6-12 weeks.  Most often, a tetracycline such as doxycycline is recommended. A sub-antimicrobial dose may be sufficient.  Oral erythromycin is used during pregnancy and in pre-pubertal children.  Oral low-dose isotretinoin may be used if antibiotics are ineffective or contraindicated.    Periorificial dermatitis can generally be prevented by the avoidance of topical steroids and occlusive face creams. When topical steroids are necessary to treat an inflammatory facial rash, they should be applied accurately to the affected area, no more than once daily in the lowest effective potency, and discontinued as soon as the rash responds.   Periorificial dermatitis sometimes recurs when the antibiotics are discontinued, or at a later date. The same treatment can be used again.    DYSHIDROTIC ECZEMA      Assessment and Plan:  Based on a thorough discussion of this condition and the management approach to it (including a comprehensive discussion of the known risks, side effects and potential benefits of treatment), the patient (family) agrees to implement the following specific plan:  Prescribed clobetasol 0.05% ointment twice a day for about 2 weeks then apply as needed to only affected areas   Monitor areas for any changes and let office know if condition worsens or doesn't improve    Follow up in 3 months   - Recommend sensitive skin care regimen  - detergent free of dyes and perfumes (example, free and clear). Try washing clothes with extra rinse cycle.  - Short lukewarm showers  - White dove bar soap  - Recommend moisturizing whole body with Creams multiple times a day (examples: Cetaphil, CeraVe. and Eucerin)  - avoid aerosols and fragrances in the home (candles, plug ins, perfume, air freshener, etc)       What is dyshidrotic  "eczema?         Dyshidrotic eczema is a type of eczema that affects mostly the hands and sometimes the feet.  It causes small, itchy blisters along the sides of the fingers.  .  It can be brought on by using strong soaps or , from excess contact with water, as an allergic reaction to something getting on the hands, as an allergic reaction to a fungal infection elsewhere (usually the feet) or from stress   Flares are treated with strong topical steroids.  Patch testing can be done to rule out an allergic reaction.  Using gloves and moisturizers to avoid irritation of the skin are important in preventing flares.        MELANOCYTIC NEVI (\"Moles\")    Assessment and Plan:  Based on a thorough discussion of this condition and the management approach to it (including a comprehensive discussion of the known risks, side effects and potential benefits of treatment), the patient (family) agrees to implement the following specific plan:  When outside we recommend using a wide brim hat, sunglasses, long sleeve and pants, sunscreen with SPF 30+ with reapplication every 2 hours, or SPF specific clothing   Benign, reassured  Annual skin check     Melanocytic Nevi  Melanocytic nevi (\"moles\") are tan or brown, raised or flat areas of the skin which have an increased number of melanocytes. Melanocytes are the cells in our body which make pigment and account for skin color.    Some moles are present at birth (I.e., \"congenital nevi\"), while others come up later in life (i.e., \"acquired nevi\").  The sun can stimulate the body to make more moles.  Sunburns are not the only thing that triggers more moles.  Chronic sun exposure can do it too.     Clinically distinguishing a healthy mole from melanoma may be difficult, even for experienced dermatologists. The \"ABCDE's\" of moles have been suggested as a means of helping to alert a person to a suspicious mole and the possible increased risk of melanoma.  The suggestions for raising " "alert are as follows:    Asymmetry: Healthy moles tend to be symmetric, while melanomas are often asymmetric.  Asymmetry means if you draw a line through the mole, the two halves do not match in color, size, shape, or surface texture. Asymmetry can be a result of rapid enlargement of a mole, the development of a raised area on a previously flat lesion, scaling, ulceration, bleeding or scabbing within the mole.  Any mole that starts to demonstrate \"asymmetry\" should be examined promptly by a board certified dermatologist.     Border: Healthy moles tend to have discrete, even borders.  The border of a melanoma often blends into the normal skin and does not sharply delineate the mole from normal skin.  Any mole that starts to demonstrate \"uneven borders\" should be examined promptly by a board certified dermatologist.     Color: Healthy moles tend to be one color throughout.  Melanomas tend to be made up of different colors ranging from dark black, blue, white, or red.  Any mole that demonstrates a color change should be examined promptly by a board certified dermatologist.     Diameter: Healthy moles tend to be smaller than 0.6 cm in size; an exception are \"congenital nevi\" that can be larger.  Melanomas tend to grow and can often be greater than 0.6 cm (1/4 of an inch, or the size of a pencil eraser). This is only a guideline, and many normal moles may be larger than 0.6 cm without being unhealthy.  Any mole that starts to change in size (small to bigger or bigger to smaller) should be examined promptly by a board certified dermatologist.     Evolving: Healthy moles tend to \"stay the same.\"  Melanomas may often show signs of change or evolution such as a change in size, shape, color, or elevation.  Any mole that starts to itch, bleed, crust, burn, hurt, or ulcerate or demonstrate a change or evolution should be examined promptly by a board certified dermatologist.      XEROSIS (\"DRY SKIN\")    Assessment and " Plan:  Based on a thorough discussion of this condition and the management approach to it (including a comprehensive discussion of the known risks, side effects and potential benefits of treatment), the patient (family) agrees to implement the following specific plan:  Use moisturizer like Eucerin,Cerave or Aveeno Cream 3 times a day for the dry skin            Dry skin refers to skin that feels dry to touch. Dry skin has a dull surface with a rough, scaly quality. The skin is less pliable and cracked. When dryness is severe, the skin may become inflamed and fissured.  Although any body site can be dry, dry skin tends to affect the shins more than any other site.    Dry skin is lacking moisture in the outer horny cell layer (stratum corneum) and this results in cracks in the skin surface.  Dry skin is also called xerosis, xeroderma or asteatosis (lack of fat).  It can affect males and females of all ages. There is some racial variability in water and lipid content of the skin.  Dry skin that starts in early childhood may be one of about 20 types of ichthyosis (fish-scale skin). There is often a family history of dry skin.   Dry skin is commonly seen in people with atopic dermatitis.  Nearly everyone > 60 years has dry skin.    Dry skin that begins later may be seen in people with certain diseases and conditions.  Postmenopausal women  Hypothyroidism  Chronic renal disease   Malnutrition and weight loss   Subclinical dermatitis   Treatment with certain drugs such as oral retinoids, diuretics and epidermal growth factor receptor inhibitors      What is the treatment for dry skin?  The mainstay of treatment of dry skin and ichthyosis is moisturisers/emollients. They should be applied liberally and often enough to:  Reduce itch   Improve the barrier function   Prevent entry of irritants, bacteria   Reduce transepidermal water loss.      How can dry skin be prevented?  Eliminate aggravating factors:  Reduce the frequency  of bathing.   A humidifier in winter and air conditioner in summer   Compare having a short shower with a prolonged soak in a bath.   Use lukewarm, not hot, water.   Replace standard soap with a substitute such as a synthetic detergent cleanser, water-miscible emollient, bath oil, anti-pruritic tar oil, colloidal oatmeal etc.   Apply an emollient liberally and often, particularly shortly after bathing, and when itchy. The drier the skin, the thicker this should be, especially on the hands.    What is the outlook for dry skin?  A tendency to dry skin may persist life-long, or it may improve once contributing factors are controlled.

## 2025-01-08 ENCOUNTER — OFFICE VISIT (OUTPATIENT)
Dept: FAMILY MEDICINE CLINIC | Facility: CLINIC | Age: 35
End: 2025-01-08
Payer: COMMERCIAL

## 2025-01-08 VITALS
SYSTOLIC BLOOD PRESSURE: 120 MMHG | RESPIRATION RATE: 16 BRPM | OXYGEN SATURATION: 98 % | HEART RATE: 98 BPM | BODY MASS INDEX: 43.23 KG/M2 | DIASTOLIC BLOOD PRESSURE: 80 MMHG | HEIGHT: 66 IN | WEIGHT: 269 LBS

## 2025-01-08 DIAGNOSIS — F41.9 ANXIETY AND DEPRESSION: ICD-10-CM

## 2025-01-08 DIAGNOSIS — F32.A ANXIETY AND DEPRESSION: ICD-10-CM

## 2025-01-08 DIAGNOSIS — Z83.3 FAMILY HISTORY OF DIABETES MELLITUS IN FATHER: ICD-10-CM

## 2025-01-08 DIAGNOSIS — E55.9 VITAMIN D DEFICIENCY: ICD-10-CM

## 2025-01-08 DIAGNOSIS — E03.9 ACQUIRED HYPOTHYROIDISM: ICD-10-CM

## 2025-01-08 DIAGNOSIS — K58.9 IRRITABLE BOWEL SYNDROME, UNSPECIFIED TYPE: ICD-10-CM

## 2025-01-08 DIAGNOSIS — R79.0 LOW FERRITIN: ICD-10-CM

## 2025-01-08 DIAGNOSIS — Z28.21 INFLUENZA VACCINATION DECLINED BY PATIENT: ICD-10-CM

## 2025-01-08 DIAGNOSIS — E78.00 ELEVATED LDL CHOLESTEROL LEVEL: ICD-10-CM

## 2025-01-08 DIAGNOSIS — Z28.21 HUMAN PAPILLOMA VIRUS (HPV) VACCINATION DECLINED: ICD-10-CM

## 2025-01-08 DIAGNOSIS — J45.20 MILD INTERMITTENT REACTIVE AIRWAY DISEASE WITH WHEEZING WITHOUT COMPLICATION: ICD-10-CM

## 2025-01-08 DIAGNOSIS — Z00.00 ANNUAL PHYSICAL EXAM: Primary | ICD-10-CM

## 2025-01-08 PROCEDURE — 99214 OFFICE O/P EST MOD 30 MIN: CPT | Performed by: FAMILY MEDICINE

## 2025-01-08 PROCEDURE — 99395 PREV VISIT EST AGE 18-39: CPT | Performed by: FAMILY MEDICINE

## 2025-01-08 RX ORDER — ALBUTEROL SULFATE 90 UG/1
2 INHALANT RESPIRATORY (INHALATION) EVERY 6 HOURS PRN
Qty: 6.7 G | Refills: 2 | Status: SHIPPED | OUTPATIENT
Start: 2025-01-08

## 2025-01-08 RX ORDER — LEVOTHYROXINE SODIUM 125 MCG
TABLET ORAL
COMMUNITY
Start: 2025-01-01

## 2025-01-08 NOTE — PATIENT INSTRUCTIONS
"Patient Education     Routine physical for adults   The Basics   Written by the doctors and editors at [facility]   What is a physical? -- A physical is a routine visit, or \"check-up,\" with your doctor. You might also hear it called a \"wellness visit\" or \"preventive visit.\"  During each visit, the doctor will:   Ask about your physical and mental health   Ask about your habits, behaviors, and lifestyle   Do an exam   Give you vaccines if needed   Talk to you about any medicines you take   Give advice about your health   Answer your questions  Getting regular check-ups is an important part of taking care of your health. It can help your doctor find and treat any problems you have. But it's also important for preventing health problems.  A routine physical is different from a \"sick visit.\" A sick visit is when you see a doctor because of a health concern or problem. Since physicals are scheduled ahead of time, you can think about what you want to ask the doctor.  How often should I get a physical? -- It depends on your age and health. In general, for people age 21 years and older:   If you are younger than 50 years, you might be able to get a physical every 3 years.   If you are 50 years or older, your doctor might recommend a physical every year.  If you have an ongoing health condition, like diabetes or high blood pressure, your doctor will probably want to see you more often.  What happens during a physical? -- In general, each visit will include:   Physical exam - The doctor or nurse will check your height, weight, heart rate, and blood pressure. They will also look at your eyes and ears. They will ask about how you are feeling and whether you have any symptoms that bother you.   Medicines - It's a good idea to bring a list of all the medicines you take to each doctor visit. Your doctor will talk to you about your medicines and answer any questions. Tell them if you are having any side effects that bother you. You " "should also tell them if you are having trouble paying for any of your medicines.   Habits and behaviors - This includes:   Your diet   Your exercise habits   Whether you smoke, drink alcohol, or use drugs   Whether you are sexually active   Whether you feel safe at home  Your doctor will talk to you about things you can do to improve your health and lower your risk of health problems. They will also offer help and support. For example, if you want to quit smoking, they can give you advice and might prescribe medicines. If you want to improve your diet or get more physical activity, they can help you with this, too.   Lab tests, if needed - The tests you get will depend on your age and situation. For example, your doctor might want to check your:   Cholesterol   Blood sugar   Iron level   Vaccines - The recommended vaccines will depend on your age, health, and what vaccines you already had. Vaccines are very important because they can prevent certain serious or deadly infections.   Discussion of screening - \"Screening\" means checking for diseases or other health problems before they cause symptoms. Your doctor can recommend screening based on your age, risk, and preferences. This might include tests to check for:   Cancer, such as breast, prostate, cervical, ovarian, colorectal, prostate, lung, or skin cancer   Sexually transmitted infections, such as chlamydia and gonorrhea   Mental health conditions like depression and anxiety  Your doctor will talk to you about the different types of screening tests. They can help you decide which screenings to have. They can also explain what the results might mean.   Answering questions - The physical is a good time to ask the doctor or nurse questions about your health. If needed, they can refer you to other doctors or specialists, too.  Adults older than 65 years often need other care, too. As you get older, your doctor will talk to you about:   How to prevent falling at " home   Hearing or vision tests   Memory testing   How to take your medicines safely   Making sure that you have the help and support you need at home  All topics are updated as new evidence becomes available and our peer review process is complete.  This topic retrieved from [employer] on: May 02, 2024.  Topic 285186 Version 1.0  Release: 32.4.3 - C32.122  © 2024 [employer] and/or its affiliates. All rights reserved.  Consumer Information Use and Disclaimer   Disclaimer: This generalized information is a limited summary of diagnosis, treatment, and/or medication information. It is not meant to be comprehensive and should be used as a tool to help the user understand and/or assess potential diagnostic and treatment options. It does NOT include all information about conditions, treatments, medications, side effects, or risks that may apply to a specific patient. It is not intended to be medical advice or a substitute for the medical advice, diagnosis, or treatment of a health care provider based on the health care provider's examination and assessment of a patient's specific and unique circumstances. Patients must speak with a health care provider for complete information about their health, medical questions, and treatment options, including any risks or benefits regarding use of medications. This information does not endorse any treatments or medications as safe, effective, or approved for treating a specific patient. [employer] and its affiliates disclaim any warranty or liability relating to this information or the use thereof.The use of this information is governed by the Terms of Use, available at https://www.woltersSignalFuseuwer.com/en/know/clinical-effectiveness-terms. 2024© [employer] and its affiliates and/or licensors. All rights reserved.  Copyright   © 2024 [employer] and/or its affiliates. All rights reserved.
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
## 2025-02-19 ENCOUNTER — RESULTS FOLLOW-UP (OUTPATIENT)
Dept: FAMILY MEDICINE CLINIC | Facility: CLINIC | Age: 35
End: 2025-02-19

## 2025-02-19 LAB
25(OH)D3 SERPL-MCNC: 24 NG/ML (ref 30–100)
ALBUMIN SERPL-MCNC: 4.3 G/DL (ref 3.6–5.1)
ALBUMIN/GLOB SERPL: 1.5 (CALC) (ref 1–2.5)
ALP SERPL-CCNC: 74 U/L (ref 31–125)
ALT SERPL-CCNC: 10 U/L (ref 6–29)
AST SERPL-CCNC: 12 U/L (ref 10–30)
BASOPHILS # BLD AUTO: 91 CELLS/UL (ref 0–200)
BASOPHILS NFR BLD AUTO: 1.6 %
BILIRUB SERPL-MCNC: 0.4 MG/DL (ref 0.2–1.2)
BLASTS # BLD: NORMAL CELLS/UL
BLASTS NFR BLD MANUAL: NORMAL %
BUN SERPL-MCNC: 10 MG/DL (ref 7–25)
BUN/CREAT SERPL: NORMAL (CALC) (ref 6–22)
CALCIUM SERPL-MCNC: 9.3 MG/DL (ref 8.6–10.2)
CHLORIDE SERPL-SCNC: 104 MMOL/L (ref 98–110)
CHOLEST SERPL-MCNC: 240 MG/DL
CHOLEST/HDLC SERPL: 4.4 (CALC)
CO2 SERPL-SCNC: 27 MMOL/L (ref 20–32)
CREAT SERPL-MCNC: 0.65 MG/DL (ref 0.5–0.97)
EOSINOPHIL # BLD AUTO: 160 CELLS/UL (ref 15–500)
EOSINOPHIL NFR BLD AUTO: 2.8 %
ERYTHROCYTE [DISTWIDTH] IN BLOOD BY AUTOMATED COUNT: 12.8 % (ref 11–15)
FERRITIN SERPL-MCNC: 35 NG/ML (ref 16–154)
FOLATE SERPL-MCNC: 6.6 NG/ML
GFR/BSA.PRED SERPLBLD CYS-BASED-ARV: 120 ML/MIN/1.73M2
GLOBULIN SER CALC-MCNC: 2.8 G/DL (CALC) (ref 1.9–3.7)
GLUCOSE SERPL-MCNC: 84 MG/DL (ref 65–99)
HBA1C MFR BLD: 5.2 % OF TOTAL HGB
HCT VFR BLD AUTO: 39.1 % (ref 35–45)
HDLC SERPL-MCNC: 55 MG/DL
HGB BLD-MCNC: 13.2 G/DL (ref 11.7–15.5)
IRON SATN MFR SERPL: 28 % (CALC) (ref 16–45)
IRON SERPL-MCNC: 70 MCG/DL (ref 40–190)
LDLC SERPL CALC-MCNC: 157 MG/DL (CALC)
LYMPHOCYTES # BLD AUTO: 1870 CELLS/UL (ref 850–3900)
LYMPHOCYTES NFR BLD AUTO: 32.8 %
MCH RBC QN AUTO: 30.5 PG (ref 27–33)
MCHC RBC AUTO-ENTMCNC: 33.8 G/DL (ref 32–36)
MCV RBC AUTO: 90.3 FL (ref 80–100)
METAMYELOCYTES # BLD: NORMAL CELLS/UL
METAMYELOCYTES NFR BLD MANUAL: NORMAL %
MONOCYTES # BLD AUTO: 416 CELLS/UL (ref 200–950)
MONOCYTES NFR BLD AUTO: 7.3 %
MYELOCYTES # BLD: NORMAL CELLS/UL
MYELOCYTES NFR BLD MANUAL: NORMAL %
NEUTROPHILS # BLD AUTO: 3164 CELLS/UL (ref 1500–7800)
NEUTROPHILS NFR BLD AUTO: 55.5 %
NEUTS BAND # BLD: NORMAL CELLS/UL (ref 0–750)
NEUTS BAND NFR BLD MANUAL: NORMAL %
NONHDLC SERPL-MCNC: 185 MG/DL (CALC)
NRBC # BLD: NORMAL CELLS/UL
NRBC BLD-RTO: NORMAL /100 WBC
PLATELET # BLD AUTO: 310 THOUSAND/UL (ref 140–400)
PMV BLD REES-ECKER: 11.8 FL (ref 7.5–12.5)
POTASSIUM SERPL-SCNC: 4.3 MMOL/L (ref 3.5–5.3)
PROMYELOCYTES # BLD: NORMAL CELLS/UL
PROMYELOCYTES NFR BLD MANUAL: NORMAL %
PROT SERPL-MCNC: 7.1 G/DL (ref 6.1–8.1)
RBC # BLD AUTO: 4.33 MILLION/UL (ref 3.8–5.1)
SERVICE CMNT-IMP: NORMAL
SODIUM SERPL-SCNC: 137 MMOL/L (ref 135–146)
TIBC SERPL-MCNC: 254 MCG/DL (CALC) (ref 250–450)
TRIGL SERPL-MCNC: 151 MG/DL
VARIANT LYMPHS NFR BLD: NORMAL % (ref 0–10)
VIT B12 SERPL-MCNC: 513 PG/ML (ref 200–1100)
WBC # BLD AUTO: 5.7 THOUSAND/UL (ref 3.8–10.8)

## 2025-02-25 DIAGNOSIS — R79.0 LOW FERRITIN: Primary | ICD-10-CM

## 2025-02-25 DIAGNOSIS — D50.9 IRON DEFICIENCY ANEMIA, UNSPECIFIED IRON DEFICIENCY ANEMIA TYPE: ICD-10-CM

## 2025-03-01 DIAGNOSIS — L71.0 PERIORIFICIAL DERMATITIS: ICD-10-CM

## 2025-03-03 RX ORDER — DOXYCYCLINE HYCLATE 20 MG
20 TABLET ORAL 2 TIMES DAILY
Qty: 60 TABLET | Refills: 2 | OUTPATIENT
Start: 2025-03-03

## 2025-03-04 ENCOUNTER — TELEPHONE (OUTPATIENT)
Dept: HEMATOLOGY ONCOLOGY | Facility: CLINIC | Age: 35
End: 2025-03-04

## 2025-03-04 ENCOUNTER — OFFICE VISIT (OUTPATIENT)
Dept: HEMATOLOGY ONCOLOGY | Facility: CLINIC | Age: 35
End: 2025-03-04
Payer: COMMERCIAL

## 2025-03-04 VITALS
WEIGHT: 274 LBS | SYSTOLIC BLOOD PRESSURE: 118 MMHG | HEIGHT: 66 IN | HEART RATE: 92 BPM | OXYGEN SATURATION: 98 % | BODY MASS INDEX: 44.03 KG/M2 | DIASTOLIC BLOOD PRESSURE: 80 MMHG | RESPIRATION RATE: 16 BRPM | TEMPERATURE: 97.7 F

## 2025-03-04 DIAGNOSIS — G25.81 RLS (RESTLESS LEGS SYNDROME): ICD-10-CM

## 2025-03-04 DIAGNOSIS — R79.0 LOW FERRITIN: Primary | ICD-10-CM

## 2025-03-04 PROCEDURE — 99213 OFFICE O/P EST LOW 20 MIN: CPT

## 2025-03-04 RX ORDER — ACETAMINOPHEN 325 MG/1
650 TABLET ORAL ONCE
Status: CANCELLED
Start: 2025-03-04 | End: 2025-03-04

## 2025-03-04 RX ORDER — DIPHENHYDRAMINE HYDROCHLORIDE 50 MG/ML
25 INJECTION INTRAMUSCULAR; INTRAVENOUS ONCE
Status: CANCELLED
Start: 2025-03-04 | End: 2025-03-04

## 2025-03-04 RX ORDER — METHYLPREDNISOLONE SODIUM SUCCINATE 40 MG/ML
10 INJECTION, POWDER, LYOPHILIZED, FOR SOLUTION INTRAMUSCULAR; INTRAVENOUS ONCE
Status: CANCELLED
Start: 2025-03-04 | End: 2025-03-04

## 2025-03-04 RX ORDER — SODIUM CHLORIDE 9 MG/ML
20 INJECTION, SOLUTION INTRAVENOUS ONCE
Status: CANCELLED | OUTPATIENT
Start: 2025-03-04

## 2025-03-04 NOTE — PROGRESS NOTES
Name: Nadine Lacey      : 1990      MRN: [id number]  Encounter Provider: MARYCRUZ White  Encounter Date: 3/4/2025   Encounter department: [facility] [place]  :  Assessment & Plan  Low ferritin    Orders:    CBC; Standing    Iron; Standing    Ferritin; Standing    TIBC Panel (incl. Iron, TIBC, % Iron Saturation); Standing    RLS (restless legs syndrome)    Orders:    Iron; Standing    Ferritin; Standing    TIBC Panel (incl. Iron, TIBC, % Iron Saturation); Standing    34-year-old female with low ferritin.  We reviewed there may be a malabsorption for a low ferritin, which could be secondary to IBS or thyroid disease.  At this time, ferritin is 35 however, Iron replacement is suggested in patients with RLS who serum is less than equal to 75ng/ml or iron saturation less than 20%. Oral iron supplements are suggested for patients with ferritin of 75, and IV iron treatment could be considered for patients with ferritin of less than 50 Management of restless legs syndrome and periodic limb movement disorder in adults - UpToDate     This patient's symptoms are worsening and ferritin dropped drastically to 35, she will likely benefit from changing her maintenance IV iron plan from every 6 months (168 days) to every 4 months (112 days), Venofer 200 mg plus premedications (Solu-Medrol 10 mg, Pepcid 20 mg, Benadryl 25 mg, Tylenol 650 mg.  Will try and have her scheduled in the next week or so for her infusion.  Will repeat CBC and iron panel prior to each iron infusion.  Recommend she take her folic acid consistently.    We will see her back in 6 months.  She is agreeable with this plan.  Patient aware to contact us for any additional questions/concerns or worsening symptoms.      Return in about 6 months (around 2025) for Provide lab scripts.    History of Present Illness   Chief Complaint   Patient presents with    Follow-up   Patient presents for follow-up of her low  ferritin.  She is here with her mother.   She is a transfer of care from MARYCRUZ Carrington. Patient has been following with hematology office since 2022. She has PMH significant for IBS with constipation and diarrhea, hypothyroidism, anxiety. She is here with her mother today. She reports her IBS is under control. Patient is status post IV iron treatments as she is not able to tolerate oral iron supplements due to significant nausea. She is taking vitamin D every other day. She is not taking any multivitamins.  She is taking folic acid sporadically.  Patient has been focusing on iron rich diet.     Patient endorses creased fatigue, dizziness, lightheadedness, increased frequency of headaches, RLS, shortness of breath, intermittent palpitations, intermittent chest pain.  She has intermittent bloody stools due to her IBS.    Labs:  7/30/2024: Hgb 13.4, MCV 92.1, WBC 6.0, platelets 288,000, otherwise unremarkable differential, vitamin B12 383, folate 8.5, serum iron 109, iron saturation 44%, ferritin 62    Pertinent Medical History     03/04/25: Note: From MARYCRUZ Carrington:  HPI: Nadine Lacey is a 34-year-old female seen in follow-up for low ferritin.  She has no source of chronic blood loss but does have a diagnosis of IBS which could be contributing with the malabsorption component.  She has symptoms that could also be associated due to her thyroid condition and has had a difficult time regulating her Synthroid dosage.  Her hemoglobin has remained within normal limits and no signs of anemia.        04/13/2018:  Hemoglobin 13.1, MCV 96, platelets 271, WBC 4.8  04/17/2019:  Hemoglobin 13 7, MCV 92.6, platelets 273, WBC 4.6  04/28/2020:  Hemoglobin 13.3, MCV 93.5, platelets 266, WBC 5.7  08/16/2021:  Hemoglobin 12.9, MCV 91.7, platelets 254, WBC 4.9  05/16/2022: Ferritin 11, iron saturation 35%, serum iron 95, TIBC 273, vitamin B12 418  07/18/2022:  Hemoglobin 12.8, MCV 90, platelets 272, WBC 4.6               Ferritin 66, iron saturation 76%, TIBC 269, serum iron 198                08/20/2022:  Hemoglobin 12.7, MCV 93.3, platelets 273, WBC 6.7              Ferritin 20, iron saturation 29%, TIBC 288, serum iron 83  9/23/2022: Hemoglobin 13.2, MCV 89.6, platelets 296, WBC 6.1              Ferritin 10, iron saturation 30%, TIBC 293, serum iron 88  IV Venofer 200 mg x6: 11/7-12/13 12/14/2022: Hemoglobin 13.3, MCV 92.6, platelets 305, WBC 6              Ferritin 191, iron saturation 75%, TIBC 259, serum iron 190  7/6/2023: Hemoglobin 14, MCV 91.9, platelets 290, WBC 6              Ferritin 43, iron saturation 43%, TIBC 264, serum iron 113  4/3/2024: Hemoglobin 13.1, MCV 91.2, platelets 302, WBC 5.9              Ferritin 18, iron saturation 3%, TIBC 254, serum iron 85              B12 424, folate 8.4    Treatment:  Venofer 200 mg x 6 doses (11/7/2022 - 12/13/2022)  Venofer 200 mg x 3 doses (4/24/2024 - 5/8/2024)  8/14/2024: Start Maintenance IV iron plan: Venofer 200 mg every 6 months = 9/17/2024  3/4/2025: change maintenance plan: Venofer 200 mg every 4 months      Review of Systems   Constitutional:  Positive for fatigue. Negative for activity change, appetite change, diaphoresis, fever and unexpected weight change.   HENT:  Negative for nosebleeds.    Eyes: Negative.    Respiratory:  Positive for shortness of breath. Negative for cough and chest tightness.    Cardiovascular:  Positive for chest pain (intermittent). Negative for palpitations and leg swelling.   Gastrointestinal:  Positive for blood in stool (intermittent). Negative for abdominal pain.   Endocrine: Negative.    Genitourinary:  Negative for hematuria.   Musculoskeletal: Negative.    Skin: Negative.    Neurological:  Positive for dizziness, light-headedness and headaches.   Hematological: Negative.            Objective   /80 (BP Location: Left arm, Patient Position: Sitting, Cuff Size: Large)   Pulse 92   Temp 97.7 °F (36.5 °C) (Temporal)    "Resp 16   Ht 5' 6\" (1.676 m)   Wt 124 kg (274 lb)   SpO2 98%   BMI 44.22 kg/m²     Physical Exam  Constitutional:       Appearance: Normal appearance.   HENT:      Head: Normocephalic and atraumatic.   Cardiovascular:      Rate and Rhythm: Regular rhythm.      Heart sounds: Normal heart sounds.   Pulmonary:      Breath sounds: Normal breath sounds.   Musculoskeletal:      Cervical back: Normal range of motion.      Right lower leg: No edema.      Left lower leg: No edema.   Skin:     General: Skin is warm and dry.   Neurological:      Mental Status: She is alert and oriented to person, place, and time.   Psychiatric:         Mood and Affect: Mood normal.         Behavior: Behavior normal.         Thought Content: Thought content normal.         Judgment: Judgment normal.         Labs: I have reviewed the following labs:  Results for orders placed or performed in visit on 02/18/25   CBC and differential   Result Value Ref Range    White Blood Cell Count 5.7 3.8 - 10.8 Thousand/uL    Red Blood Cell Count 4.33 3.80 - 5.10 Million/uL    Hemoglobin 13.2 11.7 - 15.5 g/dL    HCT 39.1 35.0 - 45.0 %    MCV 90.3 80.0 - 100.0 fL    MCH 30.5 27.0 - 33.0 pg    MCHC 33.8 32.0 - 36.0 g/dL    RDW 12.8 11.0 - 15.0 %    Platelet Count 310 140 - 400 Thousand/uL    SL AMB MPV 11.8 7.5 - 12.5 fL    Neutrophils (Absolute) 3,164 1,500 - 7,800 cells/uL    Absolute Bands CANCELED 0 - 750 cells/uL    Absolute Metamyelocytes CANCELED 0 cells/uL    SL AMB ABSOLUTE MYELOCYTES CANCELED 0 cells/uL    Absolute Promyelocytes CANCELED 0 cells/uL    Lymphocytes (Absolute) 1,870 850 - 3,900 cells/uL    Monocytes (Absolute) 416 200 - 950 cells/uL    Eosinophils (Absolute) 160 15 - 500 cells/uL    Basophils ABS 91 0 - 200 cells/uL    Absolute Blasts CANCELED 0 cells/uL    Absolute NRBC's CANCELED 0 cells/uL    Neutrophils 55.5 %    Bands PCT CANCELED %    Metamyelocytes CANCELED %    SL AMB MYELOCYTES CANCELED %    Promyelocytes CANCELED %    " Lymphocytes 32.8 %    Reactive Lymphocytes CANCELED 0 - 10 %    Monocytes 7.3 %    Eosinophils 2.8 %    Basophils PCT 1.6 %    Blasts CANCELED %    NRBC's CANCELED 0 /100 WBC    Comment(s) CANCELED    TIBC   Result Value Ref Range    Iron, Serum 70 40 - 190 mcg/dL    Total Iron Binding Capacity (TIBC) 254 250 - 450 mcg/dL (calc)    Iron Saturation 28 16 - 45 % (calc)   Result Value Ref Range    Ferritin 35 16 - 154 ng/mL   Vitamin B12   Result Value Ref Range    Vitamin B-12 513 200 - 1,100 pg/mL   Result Value Ref Range    FOLATE, SERUM 6.6 ng/mL   HOUSE ACCOUNT TRACKING   Result Value Ref Range    Tracking House Account     I spent 20 minutes in chart review, counseling, coordination of care, and documentation.    This note has been generated by voice recognition software system.  Therefore, there may be spelling, grammar, and or syntax errors. Please contact if questions arise.

## 2025-03-04 NOTE — ASSESSMENT & PLAN NOTE
Orders:    CBC; Standing    Iron; Standing    Ferritin; Standing    TIBC Panel (incl. Iron, TIBC, % Iron Saturation); Standing

## 2025-03-04 NOTE — ASSESSMENT & PLAN NOTE
Orders:    Iron; Standing    Ferritin; Standing    TIBC Panel (incl. Iron, TIBC, % Iron Saturation); Standing

## 2025-03-04 NOTE — TELEPHONE ENCOUNTER
Please schedule IV iron, she has a maintenance plan of every six months.  She is due to this week.  Please contact patient.

## 2025-03-07 ENCOUNTER — VBI (OUTPATIENT)
Dept: ADMINISTRATIVE | Facility: OTHER | Age: 35
End: 2025-03-07

## 2025-03-07 NOTE — TELEPHONE ENCOUNTER
03/07/25 5:05 PM     Chart reviewed for Pap Smear (HPV) aka Cervical Cancer Screening ; nothing is submitted to the patient's insurance at this time.     Mildred Sotomayor   [facility]

## 2025-03-10 DIAGNOSIS — R79.0 LOW FERRITIN: ICD-10-CM

## 2025-03-10 DIAGNOSIS — G25.81 RLS (RESTLESS LEGS SYNDROME): Primary | ICD-10-CM

## 2025-03-10 RX ORDER — METHYLPREDNISOLONE SODIUM SUCCINATE 40 MG/ML
10 INJECTION, POWDER, LYOPHILIZED, FOR SOLUTION INTRAMUSCULAR; INTRAVENOUS ONCE
Status: CANCELLED
Start: 2025-03-12 | End: 2025-03-12

## 2025-03-10 RX ORDER — DIPHENHYDRAMINE HYDROCHLORIDE 50 MG/ML
25 INJECTION INTRAMUSCULAR; INTRAVENOUS ONCE
Status: CANCELLED
Start: 2025-03-12 | End: 2025-03-12

## 2025-03-10 RX ORDER — SODIUM CHLORIDE 9 MG/ML
20 INJECTION, SOLUTION INTRAVENOUS ONCE
Status: CANCELLED | OUTPATIENT
Start: 2025-03-12

## 2025-03-10 RX ORDER — ACETAMINOPHEN 325 MG/1
650 TABLET ORAL ONCE
Status: CANCELLED
Start: 2025-03-12 | End: 2025-03-12

## 2025-03-11 ENCOUNTER — TELEPHONE (OUTPATIENT)
Age: 35
End: 2025-03-11

## 2025-03-11 NOTE — TELEPHONE ENCOUNTER
Pt returned call.Pt would like to remain on [facility] for TT. [place] or [address] location, Female provider. [facility] was updated.

## 2025-03-11 NOTE — TELEPHONE ENCOUNTER
Contacted patient off of [facility]  wait list to verify needs of services in attempts to update list with patient preferences. LVM for patient to contact intake dept  in regards to updating wait list.

## 2025-03-11 NOTE — TELEPHONE ENCOUNTER
Per PROMISe verification patient has [facility] Coverage   Recipient ID [id number]    [facility]

## 2025-03-12 ENCOUNTER — HOSPITAL ENCOUNTER (OUTPATIENT)
Dept: INFUSION CENTER | Facility: CLINIC | Age: 35
Discharge: HOME | End: 2025-03-12
Payer: COMMERCIAL

## 2025-03-12 VITALS
TEMPERATURE: 97 F | HEART RATE: 80 BPM | OXYGEN SATURATION: 98 % | SYSTOLIC BLOOD PRESSURE: 118 MMHG | DIASTOLIC BLOOD PRESSURE: 80 MMHG | RESPIRATION RATE: 18 BRPM

## 2025-03-12 DIAGNOSIS — G25.81 RLS (RESTLESS LEGS SYNDROME): ICD-10-CM

## 2025-03-12 DIAGNOSIS — R79.0 LOW FERRITIN: Primary | ICD-10-CM

## 2025-03-12 PROCEDURE — 96365 THER/PROPH/DIAG IV INF INIT: CPT

## 2025-03-12 PROCEDURE — 96375 TX/PRO/DX INJ NEW DRUG ADDON: CPT

## 2025-03-12 RX ORDER — DIPHENHYDRAMINE HYDROCHLORIDE 50 MG/ML
25 INJECTION, SOLUTION INTRAMUSCULAR; INTRAVENOUS ONCE
Status: COMPLETED | OUTPATIENT
Start: 2025-03-12 | End: 2025-03-12

## 2025-03-12 RX ORDER — SODIUM CHLORIDE 9 MG/ML
20 INJECTION, SOLUTION INTRAVENOUS ONCE
OUTPATIENT
Start: 2025-07-02

## 2025-03-12 RX ORDER — ACETAMINOPHEN 325 MG/1
650 TABLET ORAL ONCE
Status: COMPLETED | OUTPATIENT
Start: 2025-03-12 | End: 2025-03-12

## 2025-03-12 RX ORDER — SODIUM CHLORIDE 9 MG/ML
20 INJECTION, SOLUTION INTRAVENOUS ONCE
Status: COMPLETED | OUTPATIENT
Start: 2025-03-12 | End: 2025-03-12

## 2025-03-12 RX ORDER — METHYLPREDNISOLONE SODIUM SUCCINATE 40 MG/ML
10 INJECTION, POWDER, LYOPHILIZED, FOR SOLUTION INTRAMUSCULAR; INTRAVENOUS ONCE
Start: 2025-07-02 | End: 2025-07-02

## 2025-03-12 RX ORDER — DIPHENHYDRAMINE HYDROCHLORIDE 50 MG/ML
25 INJECTION, SOLUTION INTRAMUSCULAR; INTRAVENOUS ONCE
Status: CANCELLED
Start: 2025-07-02 | End: 2025-07-02

## 2025-03-12 RX ORDER — METHYLPREDNISOLONE SODIUM SUCCINATE 40 MG/ML
10 INJECTION, POWDER, LYOPHILIZED, FOR SOLUTION INTRAMUSCULAR; INTRAVENOUS ONCE
Status: COMPLETED | OUTPATIENT
Start: 2025-03-12 | End: 2025-03-12

## 2025-03-12 RX ORDER — ACETAMINOPHEN 325 MG/1
650 TABLET ORAL ONCE
Start: 2025-07-02 | End: 2025-07-02

## 2025-03-12 RX ADMIN — METHYLPREDNISOLONE SODIUM SUCCINATE 10 MG: 40 INJECTION, POWDER, FOR SOLUTION INTRAMUSCULAR; INTRAVENOUS at 07:50

## 2025-03-12 RX ADMIN — FAMOTIDINE 20 MG: 10 INJECTION INTRAVENOUS at 07:51

## 2025-03-12 RX ADMIN — ACETAMINOPHEN 650 MG: 325 TABLET ORAL at 07:49

## 2025-03-12 RX ADMIN — DIPHENHYDRAMINE HYDROCHLORIDE 25 MG: 50 INJECTION, SOLUTION INTRAMUSCULAR; INTRAVENOUS at 07:49

## 2025-03-12 RX ADMIN — IRON SUCROSE 200 MG: 20 INJECTION, SOLUTION INTRAVENOUS at 08:29

## 2025-03-12 RX ADMIN — SODIUM CHLORIDE 20 ML/HR: 9 INJECTION, SOLUTION INTRAVENOUS at 07:50

## 2025-03-13 ENCOUNTER — OFFICE VISIT (OUTPATIENT)
Dept: FAMILY MEDICINE CLINIC | Facility: CLINIC | Age: 35
End: 2025-03-13
Payer: COMMERCIAL

## 2025-03-13 VITALS
HEIGHT: 66 IN | WEIGHT: 275 LBS | HEART RATE: 101 BPM | SYSTOLIC BLOOD PRESSURE: 132 MMHG | BODY MASS INDEX: 44.2 KG/M2 | OXYGEN SATURATION: 98 % | DIASTOLIC BLOOD PRESSURE: 80 MMHG

## 2025-03-13 DIAGNOSIS — R79.0 LOW FERRITIN: ICD-10-CM

## 2025-03-13 DIAGNOSIS — F32.A ANXIETY AND DEPRESSION: ICD-10-CM

## 2025-03-13 DIAGNOSIS — E78.00 ELEVATED LDL CHOLESTEROL LEVEL: Primary | ICD-10-CM

## 2025-03-13 DIAGNOSIS — E55.9 VITAMIN D DEFICIENCY: ICD-10-CM

## 2025-03-13 DIAGNOSIS — E03.9 ACQUIRED HYPOTHYROIDISM: ICD-10-CM

## 2025-03-13 DIAGNOSIS — F41.9 ANXIETY AND DEPRESSION: ICD-10-CM

## 2025-03-13 PROCEDURE — 99214 OFFICE O/P EST MOD 30 MIN: CPT | Performed by: FAMILY MEDICINE

## 2025-03-13 RX ORDER — DIPHENHYDRAMINE HCL 25 MG
25 TABLET ORAL ONCE
Start: 2025-03-13 | End: 2025-03-13

## 2025-03-13 RX ORDER — DIPHENHYDRAMINE HCL 25 MG
25 TABLET ORAL ONCE
Status: CANCELLED
Start: 2025-03-13 | End: 2025-03-13

## 2025-03-13 NOTE — PROGRESS NOTES
Changed from IV push to oral tablet as per infusion center, patient was lightheaded and dizzy after the IV Push.

## 2025-03-13 NOTE — PROGRESS NOTES
Patient to infusion for venofer.  She offers no complaints.  She tolerated treatment today.  Next appointment confirmed 7/2 0730.
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
## 2025-04-10 DIAGNOSIS — E66.01 MORBID OBESITY WITH BMI OF 40.0-44.9, ADULT (HCC): Primary | ICD-10-CM

## 2025-05-01 ENCOUNTER — OFFICE VISIT (OUTPATIENT)
Age: 35
End: 2025-05-01
Payer: COMMERCIAL

## 2025-05-01 VITALS
TEMPERATURE: 98.4 F | HEIGHT: 66 IN | WEIGHT: 265 LBS | OXYGEN SATURATION: 99 % | HEART RATE: 83 BPM | BODY MASS INDEX: 42.59 KG/M2 | DIASTOLIC BLOOD PRESSURE: 84 MMHG | SYSTOLIC BLOOD PRESSURE: 122 MMHG

## 2025-05-01 DIAGNOSIS — L71.0 PERIORIFICIAL DERMATITIS: Primary | ICD-10-CM

## 2025-05-01 DIAGNOSIS — L30.1 DYSHIDROTIC ECZEMA: ICD-10-CM

## 2025-05-01 PROCEDURE — 99214 OFFICE O/P EST MOD 30 MIN: CPT | Performed by: STUDENT IN AN ORGANIZED HEALTH CARE EDUCATION/TRAINING PROGRAM

## 2025-05-01 RX ORDER — TACROLIMUS 1 MG/G
OINTMENT TOPICAL 2 TIMES DAILY
Qty: 60 G | Refills: 3 | Status: SHIPPED | OUTPATIENT
Start: 2025-05-01

## 2025-05-01 RX ORDER — CLOBETASOL PROPIONATE 0.5 MG/G
OINTMENT TOPICAL 2 TIMES DAILY
Qty: 60 G | Refills: 3 | Status: SHIPPED | OUTPATIENT
Start: 2025-05-01

## 2025-05-01 NOTE — PROGRESS NOTES
"[facility] Note     Patient Name: Nadine Lacey  Encounter Date: 05/01/25       Have you been cared for by a [facility] Dermatologist in the last 3 years and, if so, which description applies to you? Yes. I have been here within the last 3 years, and my medical history has NOT changed since that time. I am of child-bearing potential.     REVIEW OF SYSTEMS:  Have you recently had or currently have any of the following? No changes in my recent health.   PAST MEDICAL HISTORY:  Have you personally ever had or currently have any of the following?  If \"YES,\" then please provide more detail. No changes in my medical history.   HISTORY OF IMMUNOSUPPRESSION: Do you have a history of any of the following:  Systemic Immunosuppression such as Diabetes, Biologic or Immunotherapy, Chemotherapy, Organ Transplantation, Bone Marrow Transplantation or Prednisone?  No     Answering \"YES\" requires the addition of the dotphrase \"IMMUNOSUPPRESSED\" as the first diagnosis of the patient's visit.   FAMILY HISTORY:  Any \"first degree relatives\" (parent, brother, sister, or child) with the following?    No changes in my family's known health.   PATIENT EXPERIENCE:    Do you want the Dermatologist to perform a COMPLETE skin exam today including a clinical examination under the \"bra and underwear\" areas?  NO  If necessary, do we have your permission to call and leave a detailed message on your Preferred Phone number that includes your specific medical information?  Yes      Allergies   Allergen Reactions   • Other Shortness Of Breath     almonds   • Strawberry C [Ascorbate - Food Allergy] GI Intolerance      Current Outpatient Medications:   •  albuterol (Proventil HFA) 90 mcg/act inhaler, Inhale 2 puffs every 6 (six) hours as needed for wheezing, Disp: 6.7 g, Rfl: 2  •  clobetasol (TEMOVATE) 0.05 % ointment, Apply topically 2 (two) times a day To affected areas of the hands only., Disp: 60 g, Rfl: 3  •  ferrous sulfate " 325 (65 Fe) mg tablet, Take by mouth daily with breakfast, Disp: , Rfl:   •  fluticasone (FLONASE) 50 mcg/act nasal spray, 1 spray into each nostril daily, Disp: 11.1 mL, Rfl: 0  •  folic acid (FOLVITE) 400 mcg tablet, TAKE 1 TABLET BY MOUTH DAILY., Disp: 30 tablet, Rfl: 2  •  Synthroid 125 MCG tablet, , Disp: , Rfl:   •  tacrolimus (PROTOPIC) 0.1 % ointment, Apply topically 2 (two) times a day To the face., Disp: 60 g, Rfl: 3              Whom besides the patient is providing clinical information about today's encounter?   NO ADDITIONAL HISTORIAN (patient alone provided history)    Physical Exam and Assessment/Plan by Diagnosis:    Chief complaint: Patient is a 36 y/o female present for follow up of Dyshidrotic Eczema using Clobetasol Ointment prn and Periorificial Dermatitis had to stop after 1.5 -2mos of Doxy developed severe headaches.    DYSHIDROTIC ECZEMA  Physical Exam:  Anatomic Location Affected:  bilateral Palms of Hands  Morphological Description:  slight erythema  Pertinent Positives:  Pertinent Negatives:    Additional History of Present Condition:  using clobetasol ointment as needed for flare ups    Assessment and Plan:  Based on a thorough discussion of this condition and the management approach to it (including a comprehensive discussion of the known risks, side effects and potential benefits of treatment), the patient (family) agrees to implement the following specific plan:  Continue clobetasol 0.05% ointment as needed for flares of hand eczema. Doing well on this regimen and only uses for a few days as needed.     PERIORIFICIAL DERMATITIS    Physical Exam:  Anatomic Location Affected:  perioral  Morphological Description:  erythematous papules and pustules  Pertinent Positives:  Pertinent Negatives:    Additional History of Present Condition:  Took Doxycycline 20mg for 1.5-2mos but stopped due to Migraine flare ups    Assessment and Plan:  Based on a thorough discussion of this condition and the  management approach to it (including a comprehensive discussion of the known risks, side effects and potential benefits of treatment), the patient (family) agrees to implement the following specific plan:  Tacrolimus Ointment apply twice a day  Discussed risks vs benefits of using Erythromycin orally if Tacrolimus doesn't help - advised to send My Chart msg    What is periorificial dermatitis?  Periorificial dermatitis is a common facial skin problem characterized by groups of itchy or tender small red bumps. It is given this name because the bumps occur around the eyes, the nostrils, the mouth and occasionally, the genitals.  The more restrictive term, perioral dermatitis, is often used when the eruption is confined to the skin in the lower half of the face, particularly around the mouth. Periocular dermatitis may be used to describe the rash affecting the eyelids.  Periorificial dermatitis mainly affects adult women aged 15 to 45 years. It is less common in men. It can also affect children of any age.    What is the cause of periorificial or periorificial dermatitis?  The exact cause of periorificial is not understood. Periorificial dermatitis may be related to:  Skin barrier dysfunction  Activation of the body's immune system  Altered bacterial levels on the skin  Bacteria from hair follicles    Unlike seborrheic dermatitis which can affect similar areas of the face, malassezia yeasts are not involved in periorificial dermatitis.  Periorificial dermatitis may be directly caused by:  Topical steroids, whether applied deliberately to facial skin or inadvertently  Nasal steroids, steroid inhalers, and oral steroids  Cosmetic creams, make-ups and sunscreens  Fluorinated toothpaste  Neglecting to wash the face  Hormonal changes and/or oral contraceptives    What are the symptoms of periorificial dermatitis?  The characteristics of facial periorificial dermatitis are:  Eruption on the chin, upper lip and eyelids    Sparing of the skin bordering the lips (which then appears pale), eyelids, nostrils  Clusters of 1-2 mm red bumps, potentially with pus  Dry and flaky skin surface  Burning irritation    In contrast to steroid-induced rosacea, periorificial dermatitis spares the cheeks and forehead.  Genital periorificial dermatitis has a similar clinical appearance. It involves the skin on and around labia majora (in females), scrotum (in males) and anus.    Granulomatous periorificial dermatitis is a variant of periorificial dermatitis that presents with persistent yellowish bumps. It occurs mainly in young children and nearly always follows the use of a corticosteroid.   Rebound flare of severe periorificial dermatitis may occur after abrupt cessation of application of potent topical steroid to facial skin.  The presentation of periorificial dermatitis is usually typical, so diagnosis can be made by history and skin exam. There are no specific tests.  Skin biopsy may occasionally be taken, and show similar findings to rosacea.     Periorificial dermatitis responds well to treatment, although it may take several weeks before there is a noticeable improvement.    General measures  Discontinue applying all face creams including topical steroids, cosmetics and sunscreens (zero therapy).  Consider a slower withdrawal from topical steroid/face creams if there is a severe flare after steroid cessation. Temporarily, replace it by a less potent or less occlusive cream or apply it less and less frequently until it is no longer required.  Wash the face with warm water alone while the rash is present. When it has cleared up, use a non-soap bar or liquid cleanser if you wish.  Choose a liquid or gel sunscreen.    Topical therapy: used to treat mild periorificial dermatitis. Choices include:  Erythromycin  Clindamycin  Metronidazole  Pimecrolimus  Azelaic acid    Oral therapy: in more severe cases, a course of oral antibiotics may be  prescribed for 6-12 weeks.  Most often, a tetracycline such as doxycycline is recommended. A sub-antimicrobial dose may be sufficient.  Oral erythromycin is used during pregnancy and in pre-pubertal children.  Oral low-dose isotretinoin may be used if antibiotics are ineffective or contraindicated.    Periorificial dermatitis can generally be prevented by the avoidance of topical steroids and occlusive face creams. When topical steroids are necessary to treat an inflammatory facial rash, they should be applied accurately to the affected area, no more than once daily in the lowest effective potency, and discontinued as soon as the rash responds.   Periorificial dermatitis sometimes recurs when the antibiotics are discontinued, or at a later date. The same treatment can be used again.    Scribe Attestation    I,:  Amparo Shrestha MA am acting as a scribe while in the presence of the attending physician.:       I,:  Ming Clemens DO personally performed the services described in this documentation    as scribed in my presence.:

## 2025-05-01 NOTE — PATIENT INSTRUCTIONS
DYSHIDROTIC ECZEMA  Physical Exam:  Anatomic Location Affected:  bilateral Palms of Hands    Assessment and Plan:  Based on a thorough discussion of this condition and the management approach to it (including a comprehensive discussion of the known risks, side effects and potential benefits of treatment), the patient (family) agrees to implement the following specific plan:  Continue same therapy    PERIORIFICIAL DERMATITIS    Physical Exam:  Anatomic Location Affected:  perioral    Assessment and Plan:  Based on a thorough discussion of this condition and the management approach to it (including a comprehensive discussion of the known risks, side effects and potential benefits of treatment), the patient (family) agrees to implement the following specific plan:  Tacrolimus Ointment apply twice a day  Discussed risks vs benefits of using Erythromycin orally is Tacrolimus doesn't help - advised to send My Chart msg    What is periorificial dermatitis?  Periorificial dermatitis is a common facial skin problem characterized by groups of itchy or tender small red bumps. It is given this name because the bumps occur around the eyes, the nostrils, the mouth and occasionally, the genitals.  The more restrictive term, perioral dermatitis, is often used when the eruption is confined to the skin in the lower half of the face, particularly around the mouth. Periocular dermatitis may be used to describe the rash affecting the eyelids.  Periorificial dermatitis mainly affects adult women aged 15 to 45 years. It is less common in men. It can also affect children of any age.    What is the cause of periorificial or periorificial dermatitis?  The exact cause of periorificial is not understood. Periorificial dermatitis may be related to:  Skin barrier dysfunction  Activation of the body's immune system  Altered bacterial levels on the skin  Bacteria from hair follicles    Unlike seborrheic dermatitis which can affect similar areas of  the face, malassezia yeasts are not involved in periorificial dermatitis.  Periorificial dermatitis may be directly caused by:  Topical steroids, whether applied deliberately to facial skin or inadvertently  Nasal steroids, steroid inhalers, and oral steroids  Cosmetic creams, make-ups and sunscreens  Fluorinated toothpaste  Neglecting to wash the face  Hormonal changes and/or oral contraceptives    What are the symptoms of periorificial dermatitis?  The characteristics of facial periorificial dermatitis are:  Eruption on the chin, upper lip and eyelids   Sparing of the skin bordering the lips (which then appears pale), eyelids, nostrils  Clusters of 1-2 mm red bumps, potentially with pus  Dry and flaky skin surface  Burning irritation    In contrast to steroid-induced rosacea, periorificial dermatitis spares the cheeks and forehead.  Genital periorificial dermatitis has a similar clinical appearance. It involves the skin on and around labia majora (in females), scrotum (in males) and anus.    Granulomatous periorificial dermatitis is a variant of periorificial dermatitis that presents with persistent yellowish bumps. It occurs mainly in young children and nearly always follows the use of a corticosteroid.   Rebound flare of severe periorificial dermatitis may occur after abrupt cessation of application of potent topical steroid to facial skin.  The presentation of periorificial dermatitis is usually typical, so diagnosis can be made by history and skin exam. There are no specific tests.  Skin biopsy may occasionally be taken, and show similar findings to rosacea.     Periorificial dermatitis responds well to treatment, although it may take several weeks before there is a noticeable improvement.    General measures  Discontinue applying all face creams including topical steroids, cosmetics and sunscreens (zero therapy).  Consider a slower withdrawal from topical steroid/face creams if there is a severe flare after  steroid cessation. Temporarily, replace it by a less potent or less occlusive cream or apply it less and less frequently until it is no longer required.  Wash the face with warm water alone while the rash is present. When it has cleared up, use a non-soap bar or liquid cleanser if you wish.  Choose a liquid or gel sunscreen.    Topical therapy: used to treat mild periorificial dermatitis. Choices include:  Erythromycin  Clindamycin  Metronidazole  Pimecrolimus  Azelaic acid    Oral therapy: in more severe cases, a course of oral antibiotics may be prescribed for 6-12 weeks.  Most often, a tetracycline such as doxycycline is recommended. A sub-antimicrobial dose may be sufficient.  Oral erythromycin is used during pregnancy and in pre-pubertal children.  Oral low-dose isotretinoin may be used if antibiotics are ineffective or contraindicated.    Periorificial dermatitis can generally be prevented by the avoidance of topical steroids and occlusive face creams. When topical steroids are necessary to treat an inflammatory facial rash, they should be applied accurately to the affected area, no more than once daily in the lowest effective potency, and discontinued as soon as the rash responds.   Periorificial dermatitis sometimes recurs when the antibiotics are discontinued, or at a later date. The same treatment can be used again.

## 2025-05-14 ENCOUNTER — OFFICE VISIT (OUTPATIENT)
Dept: OBGYN CLINIC | Facility: CLINIC | Age: 35
End: 2025-05-14
Payer: COMMERCIAL

## 2025-05-14 VITALS
WEIGHT: 265 LBS | DIASTOLIC BLOOD PRESSURE: 72 MMHG | HEIGHT: 66 IN | BODY MASS INDEX: 42.59 KG/M2 | SYSTOLIC BLOOD PRESSURE: 126 MMHG

## 2025-05-14 DIAGNOSIS — Z12.4 ENCOUNTER FOR SCREENING FOR MALIGNANT NEOPLASM OF CERVIX: ICD-10-CM

## 2025-05-14 DIAGNOSIS — Z11.51 SCREENING FOR HPV (HUMAN PAPILLOMAVIRUS): ICD-10-CM

## 2025-05-14 DIAGNOSIS — Z01.419 WELL WOMAN EXAM WITH ROUTINE GYNECOLOGICAL EXAM: Primary | ICD-10-CM

## 2025-05-14 PROCEDURE — G0145 SCR C/V CYTO,THINLAYER,RESCR: HCPCS | Performed by: OBSTETRICS & GYNECOLOGY

## 2025-05-14 PROCEDURE — 99395 PREV VISIT EST AGE 18-39: CPT | Performed by: OBSTETRICS & GYNECOLOGY

## 2025-05-14 PROCEDURE — G0476 HPV COMBO ASSAY CA SCREEN: HCPCS | Performed by: OBSTETRICS & GYNECOLOGY

## 2025-05-14 NOTE — PROGRESS NOTES
ASSESSMENT & PLAN:   Diagnoses and all orders for this visit:    Well woman exam with routine gynecological exam  -     Liquid-based pap, screening    Encounter for screening for malignant neoplasm of cervix  -     Liquid-based pap, screening    Screening for HPV (human papillomavirus)  -     Liquid-based pap, screening          The following were reviewed in today's visit: ASCCP guidelines, Gardasil vaccination, STD testing breast self exam, family planning choices, and exercise.    Patient to return to office in yearly for annual exam.     All questions have been answered to her satisfaction.        CC:  Annual Gynecologic Examination  Chief Complaint   Patient presents with    Gynecologic Exam     Pt is here for her yearly exam. Pap due. Pt has a vaginal lump that is painful.  2022 NILM        HPI: Nadine Lacey is a 35 y.o.  who presents for annual gynecologic examination.  She has the following concerns:  lump in vaginal area that's painful, noted it about 2 months ago. It got smaller. Still can be painful.       Health Maintenance:    Exercise: frequently  Breast exams/breast awareness: yes    Past Medical History:   Diagnosis Date    Acne     Allergic     Anxiety     Constipation     Depression     Diarrhea     Disease of thyroid gland     Eczema     GERD (gastroesophageal reflux disease)     Headache(784.0)     Hypothyroidism     IBS (irritable bowel syndrome)     Nausea     Panic attacks     Rash     Skin tag     Varicella        Past Surgical History:   Procedure Laterality Date    DC HYSTEROSCOPY BX ENDOMETRIUM&/POLYPC W/WO D&C N/A 2024    Procedure: Hysteroscopy D+C;  Surgeon: Saritha Hoyos DO;  Location: [facility] MAIN OR;  Service: Gynecology    DC LAPS ABD PRTM&OMENTUM DX W/WO SPEC BR/WA SPX N/A 2024    Procedure: EXAM UNDER ANESTHESIA; LAPAROSCOPY DIAGNOSTIC; LAPAROSCOPIC. LEFT PARA-TUBAL CYSTECTOMY, LYSIS OF ADHESIONS;  Surgeon: Saritha Hoyos DO;  Location: [facility];  Service: Gynecology    SKIN BIOPSY      WISDOM TOOTH EXTRACTION         Past OB/Gyn History:   Patient's last menstrual period was 2025 (exact date).    Last Pap:  : no abnormalities  History of abnormal Pap smear: no  HPV vaccine completed: no    Patient is not currently sexually active.   STD testing: no  Current contraception:none      Family History  Family History   Problem Relation Age of Onset    Hypothyroidism Mother     Allergies Mother     Thyroid disease Mother     Diabetes Father     Hyperlipidemia Father     Psoriasis Father     Hip dysplasia Sister     Seizures Sister     Depression Brother     Substance Abuse Brother         heroin    Stroke Paternal Grandmother     Skin cancer Paternal Grandfather     Prostate cancer Paternal Grandfather     Lung cancer Paternal Grandfather     Cancer Paternal Grandfather         Skin, Prostate, Brain,Lung    Osteoporosis Paternal Grandfather     Arthritis Paternal Grandfather     Psoriasis Paternal Grandfather     Personality disorder Sister     Breast cancer Neg Hx     Colon cancer Neg Hx     Ovarian cancer Neg Hx     Uterine cancer Neg Hx     Cervical cancer Neg Hx        Family history of uterine or ovarian cancer: no  Family history of breast cancer: no  Family history of colon cancer: no    Social History:  Social History     Socioeconomic History    Marital status: Single     Spouse name: Not on file    Number of children: Not on file    Years of education: Not on file    Highest education level: Not on file   Occupational History    Not on file   Tobacco Use    Smoking status: Never    Smokeless tobacco: Never   Vaping Use    Vaping status: Never Used   Substance and Sexual Activity    Alcohol use: Not Currently    Drug use: Never    Sexual activity: Never   Other Topics Concern    Not on file   Social History Narrative    Brother  in 2017      Social Drivers of Health     Financial Resource Strain: Not on file   Food  "Insecurity: Not on file   Transportation Needs: Not on file   Physical Activity: Not on file   Stress: Not on file   Social Connections: Not on file   Intimate Partner Violence: Not on file   Housing Stability: Not on file     Domestic violence screen: negative    Allergies:  Allergies[1]    Medications:  Current Medications[2]    Review of Systems:  Review of Systems   Constitutional:  Negative for chills and fever.   Respiratory:  Negative for shortness of breath.    Cardiovascular:  Negative for chest pain.   Gastrointestinal:  Positive for constipation (IBS) and nausea. Negative for abdominal distention, abdominal pain, blood in stool and vomiting.   Genitourinary:  Negative for difficulty urinating, dyspareunia, dysuria, frequency, menstrual problem, pelvic pain, urgency, vaginal bleeding, vaginal discharge and vaginal pain.   Neurological:  Negative for headaches.         Physical Exam:  /72 (BP Location: Right arm, Patient Position: Sitting, Cuff Size: Standard)   Ht 5' 6\" (1.676 m)   Wt 120 kg (265 lb)   LMP 05/05/2025 (Exact Date)   BMI 42.77 kg/m²    Physical Exam  Constitutional:       General: She is awake.      Appearance: Normal appearance. She is well-developed.   Genitourinary:      Vulva, bladder and urethral meatus normal.      Right Labia: No rash, tenderness or lesions.     Left Labia: No tenderness, lesions or rash.     No labial fusion noted.            Vaginal tenderness present.      No vaginal discharge, erythema or bleeding.      No vaginal prolapse present.     No vaginal atrophy present.       Right Adnexa: not tender, not full and no mass present.     Left Adnexa: not tender, not full and no mass present.     Cervix is nulliparous.      No cervical motion tenderness, discharge, lesion or polyp.      Uterus is not enlarged, tender or irregular.      No uterine mass detected.     No urethral prolapse present.      Bladder is not tender.       Pelvic exam was performed with " patient in the lithotomy position.   Breasts:     Right: No inverted nipple, mass, nipple discharge, skin change or tenderness.      Left: No inverted nipple, mass, nipple discharge, skin change or tenderness.      Breast exam comments: Fibrocystic changes.  HENT:      Head: Normocephalic and atraumatic.     Cardiovascular:      Rate and Rhythm: Normal rate and regular rhythm.      Heart sounds: Normal heart sounds.   Pulmonary:      Effort: Pulmonary effort is normal. No tachypnea or respiratory distress.      Breath sounds: Normal breath sounds.   Abdominal:      General: Abdomen is flat. There is no distension.      Palpations: Abdomen is soft.      Tenderness: There is no abdominal tenderness. There is no guarding or rebound.     Musculoskeletal:      Cervical back: Neck supple.   Lymphadenopathy:      Upper Body:      Right upper body: No supraclavicular or axillary adenopathy.      Left upper body: No supraclavicular or axillary adenopathy.     Neurological:      General: No focal deficit present.      Mental Status: She is alert and oriented to person, place, and time.     Psychiatric:         Mood and Affect: Mood normal.         Behavior: Behavior normal.         Thought Content: Thought content normal.         Judgment: Judgment normal.   Vitals reviewed.                                    [1]   Allergies  Allergen Reactions    Other Shortness Of Breath     almonds    Strawberry C [Ascorbate - Food Allergy] GI Intolerance   [2]   Current Outpatient Medications:     albuterol (Proventil HFA) 90 mcg/act inhaler, Inhale 2 puffs every 6 (six) hours as needed for wheezing, Disp: 6.7 g, Rfl: 2    clobetasol (TEMOVATE) 0.05 % ointment, Apply topically 2 (two) times a day To affected areas of the hands only., Disp: 60 g, Rfl: 3    ferrous sulfate 325 (65 Fe) mg tablet, Take by mouth daily with breakfast, Disp: , Rfl:     fluticasone (FLONASE) 50 mcg/act nasal spray, 1 spray into each nostril daily, Disp: 11.1 mL,  Rfl: 0    folic acid (FOLVITE) 400 mcg tablet, TAKE 1 TABLET BY MOUTH DAILY., Disp: 30 tablet, Rfl: 2    Synthroid 125 MCG tablet, , Disp: , Rfl:     tacrolimus (PROTOPIC) 0.1 % ointment, Apply topically 2 (two) times a day To the face., Disp: 60 g, Rfl: 3

## 2025-05-15 LAB
HPV HR 12 DNA CVX QL NAA+PROBE: NEGATIVE
HPV16 DNA CVX QL NAA+PROBE: NEGATIVE
HPV18 DNA CVX QL NAA+PROBE: NEGATIVE

## 2025-05-16 ENCOUNTER — RESULTS FOLLOW-UP (OUTPATIENT)
Dept: OBGYN CLINIC | Facility: CLINIC | Age: 35
End: 2025-05-16
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
## 2025-05-19 LAB
LAB AP GYN PRIMARY INTERPRETATION: NORMAL
Lab: NORMAL

## 2025-05-22 ENCOUNTER — HOSPITAL ENCOUNTER (EMERGENCY)
Facility: HOSPITAL | Age: 35
Discharge: HOME | End: 2025-05-22
Payer: COMMERCIAL

## 2025-05-22 ENCOUNTER — APPOINTMENT (EMERGENCY)
Dept: RADIOLOGY | Facility: HOSPITAL | Age: 35
End: 2025-05-22
Payer: COMMERCIAL

## 2025-05-22 VITALS
TEMPERATURE: 98.4 F | SYSTOLIC BLOOD PRESSURE: 144 MMHG | HEART RATE: 77 BPM | OXYGEN SATURATION: 96 % | DIASTOLIC BLOOD PRESSURE: 83 MMHG | RESPIRATION RATE: 22 BRPM

## 2025-05-22 DIAGNOSIS — S16.1XXA ACUTE STRAIN OF NECK MUSCLE, INITIAL ENCOUNTER: ICD-10-CM
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
## 2025-05-22 LAB
ATRIAL RATE: 69 BPM
P AXIS: 37 DEGREES
PR INTERVAL: 150 MS
QRS AXIS: -6 DEGREES
QRSD INTERVAL: 80 MS
QT INTERVAL: 410 MS
QTC INTERVAL: 439 MS
T WAVE AXIS: 5 DEGREES
VENTRICULAR RATE: 69 BPM

## 2025-05-22 PROCEDURE — 96372 THER/PROPH/DIAG INJ SC/IM: CPT

## 2025-05-22 PROCEDURE — 93010 ELECTROCARDIOGRAM REPORT: CPT | Performed by: INTERNAL MEDICINE

## 2025-05-22 PROCEDURE — 72125 CT NECK SPINE W/O DYE: CPT

## 2025-05-22 PROCEDURE — 99284 EMERGENCY DEPT VISIT MOD MDM: CPT

## 2025-05-22 PROCEDURE — 71045 X-RAY EXAM CHEST 1 VIEW: CPT

## 2025-05-22 PROCEDURE — 93005 ELECTROCARDIOGRAM TRACING: CPT

## 2025-05-22 RX ORDER — KETOROLAC TROMETHAMINE 30 MG/ML
30 INJECTION, SOLUTION INTRAMUSCULAR; INTRAVENOUS ONCE
Status: COMPLETED | OUTPATIENT
Start: 2025-05-22 | End: 2025-05-22

## 2025-05-22 RX ORDER — ACETAMINOPHEN 325 MG/1
975 TABLET ORAL ONCE
Status: COMPLETED | OUTPATIENT
Start: 2025-05-22 | End: 2025-05-22

## 2025-05-22 RX ADMIN — ACETAMINOPHEN 975 MG: 325 TABLET, FILM COATED ORAL at 14:41

## 2025-05-22 RX ADMIN — KETOROLAC TROMETHAMINE 30 MG: 30 INJECTION, SOLUTION INTRAMUSCULAR; INTRAVENOUS at 14:42

## 2025-05-22 NOTE — DISCHARGE INSTRUCTIONS
Take 600 mg ibuprofen (Motrin) 3 times daily with food for the next 3 days.  Take 1000 mg of acetaminophen (extra strength Tylenol) up to 3 times a day as needed for breakthrough pain.  Rest, ice, and use heat as needed.  Consider warm shower or bath with Epsom salt.  Follow-up with primary care as needed for ongoing aches or pains or discomfort.  Promptly return to the ER if develop new severe headache, double vision or vision loss, facial droop, slurred speech, numbness or weakness, difficulty breathing, vomiting, or lethargy.
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
## 2025-05-28 ENCOUNTER — OFFICE VISIT (OUTPATIENT)
Dept: FAMILY MEDICINE CLINIC | Facility: CLINIC | Age: 35
End: 2025-05-28
Payer: COMMERCIAL

## 2025-05-28 VITALS
WEIGHT: 268 LBS | BODY MASS INDEX: 43.07 KG/M2 | DIASTOLIC BLOOD PRESSURE: 78 MMHG | SYSTOLIC BLOOD PRESSURE: 122 MMHG | HEART RATE: 96 BPM | HEIGHT: 66 IN | OXYGEN SATURATION: 99 %

## 2025-05-28 DIAGNOSIS — M62.838 NECK MUSCLE SPASM: ICD-10-CM
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
## 2025-05-28 PROCEDURE — 99213 OFFICE O/P EST LOW 20 MIN: CPT | Performed by: FAMILY MEDICINE

## 2025-05-28 RX ORDER — NAPROXEN 500 MG/1
500 TABLET ORAL 2 TIMES DAILY WITH MEALS
Qty: 28 TABLET | Refills: 0 | Status: SHIPPED | OUTPATIENT
Start: 2025-05-28
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
## 2025-06-03 LAB
BASOPHILS # BLD AUTO: 92 CELLS/UL (ref 0–200)
BASOPHILS NFR BLD AUTO: 1.8 %
EOSINOPHIL # BLD AUTO: 148 CELLS/UL (ref 15–500)
EOSINOPHIL NFR BLD AUTO: 2.9 %
ERYTHROCYTE [DISTWIDTH] IN BLOOD BY AUTOMATED COUNT: 12.6 % (ref 11–15)
FERRITIN SERPL-MCNC: 56 NG/ML (ref 16–154)
HCT VFR BLD AUTO: 40.7 % (ref 35–45)
HGB BLD-MCNC: 13.2 G/DL (ref 11.7–15.5)
IRON SATN MFR SERPL: 43 % (CALC) (ref 16–45)
IRON SERPL-MCNC: 101 MCG/DL (ref 40–190)
LYMPHOCYTES # BLD AUTO: 1851 CELLS/UL (ref 850–3900)
LYMPHOCYTES NFR BLD AUTO: 36.3 %
MCH RBC QN AUTO: 30.1 PG (ref 27–33)
MCHC RBC AUTO-ENTMCNC: 32.4 G/DL (ref 32–36)
MCV RBC AUTO: 92.7 FL (ref 80–100)
MONOCYTES # BLD AUTO: 367 CELLS/UL (ref 200–950)
MONOCYTES NFR BLD AUTO: 7.2 %
NEUTROPHILS # BLD AUTO: 2642 CELLS/UL (ref 1500–7800)
NEUTROPHILS NFR BLD AUTO: 51.8 %
PLATELET # BLD AUTO: 287 THOUSAND/UL (ref 140–400)
PMV BLD REES-ECKER: 11.5 FL (ref 7.5–12.5)
RBC # BLD AUTO: 4.39 MILLION/UL (ref 3.8–5.1)
TIBC SERPL-MCNC: 234 MCG/DL (CALC) (ref 250–450)
WBC # BLD AUTO: 5.1 THOUSAND/UL (ref 3.8–10.8)

## 2025-06-11 ENCOUNTER — TELEPHONE (OUTPATIENT)
Age: 35
End: 2025-06-11

## 2025-06-11 NOTE — TELEPHONE ENCOUNTER
Pt's appt needs to be rescheduled d/t provider not being in the office. Attempted to reach patient about need of appointment change with no success. Detailed VM left with [facility] number [phone] for patient to return call to reschedule.     Additional message sent Via [employer].

## 2025-06-11 NOTE — TELEPHONE ENCOUNTER
Pt called back and r/s appt with Maxine for 9/4 at 0830. Pt verbalized understanding of new appt date and time.

## 2025-06-25 ENCOUNTER — OFFICE VISIT (OUTPATIENT)
Dept: FAMILY MEDICINE CLINIC | Facility: CLINIC | Age: 35
End: 2025-06-25

## 2025-06-25 VITALS
HEIGHT: 66 IN | OXYGEN SATURATION: 99 % | HEART RATE: 79 BPM | DIASTOLIC BLOOD PRESSURE: 70 MMHG | BODY MASS INDEX: 42.11 KG/M2 | SYSTOLIC BLOOD PRESSURE: 108 MMHG | WEIGHT: 262 LBS | RESPIRATION RATE: 16 BRPM

## 2025-06-25 DIAGNOSIS — M62.838 NECK MUSCLE SPASM: ICD-10-CM
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
## 2025-06-25 DIAGNOSIS — D50.9 IRON DEFICIENCY ANEMIA, UNSPECIFIED IRON DEFICIENCY ANEMIA TYPE: ICD-10-CM

## 2025-06-25 DIAGNOSIS — F32.A ANXIETY AND DEPRESSION: ICD-10-CM

## 2025-06-25 DIAGNOSIS — G43.809 OTHER MIGRAINE WITHOUT STATUS MIGRAINOSUS, NOT INTRACTABLE: Primary | ICD-10-CM

## 2025-06-25 DIAGNOSIS — E03.9 ACQUIRED HYPOTHYROIDISM: ICD-10-CM

## 2025-06-25 DIAGNOSIS — F41.9 ANXIETY AND DEPRESSION: ICD-10-CM

## 2025-06-25 RX ORDER — SUMATRIPTAN SUCCINATE 25 MG/1
TABLET ORAL
Qty: 30 TABLET | Refills: 1 | Status: SHIPPED | OUTPATIENT
Start: 2025-06-25
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
## 2025-06-30 RX ORDER — DIPHENHYDRAMINE HCL 25 MG
25 TABLET ORAL ONCE
Status: CANCELLED
Start: 2025-07-02 | End: 2025-07-02

## 2025-07-02 ENCOUNTER — HOSPITAL ENCOUNTER (OUTPATIENT)
Dept: INFUSION CENTER | Facility: CLINIC | Age: 35
Discharge: HOME | End: 2025-07-02
Attending: INTERNAL MEDICINE
Payer: COMMERCIAL

## 2025-07-02 VITALS
DIASTOLIC BLOOD PRESSURE: 73 MMHG | TEMPERATURE: 97.2 F | RESPIRATION RATE: 18 BRPM | OXYGEN SATURATION: 100 % | HEART RATE: 88 BPM | SYSTOLIC BLOOD PRESSURE: 109 MMHG

## 2025-07-02 DIAGNOSIS — G25.81 RLS (RESTLESS LEGS SYNDROME): Primary | ICD-10-CM

## 2025-07-02 DIAGNOSIS — R79.0 LOW FERRITIN: ICD-10-CM

## 2025-07-02 PROCEDURE — 96375 TX/PRO/DX INJ NEW DRUG ADDON: CPT

## 2025-07-02 PROCEDURE — 96367 TX/PROPH/DG ADDL SEQ IV INF: CPT

## 2025-07-02 PROCEDURE — 96365 THER/PROPH/DIAG IV INF INIT: CPT

## 2025-07-02 RX ORDER — METHYLPREDNISOLONE SODIUM SUCCINATE 40 MG/ML
10 INJECTION, POWDER, LYOPHILIZED, FOR SOLUTION INTRAMUSCULAR; INTRAVENOUS ONCE
Status: COMPLETED | OUTPATIENT
Start: 2025-07-02 | End: 2025-07-02

## 2025-07-02 RX ORDER — DIPHENHYDRAMINE HCL 25 MG
25 TABLET ORAL ONCE
Status: COMPLETED | OUTPATIENT
Start: 2025-07-02 | End: 2025-07-02

## 2025-07-02 RX ORDER — SODIUM CHLORIDE 9 MG/ML
20 INJECTION, SOLUTION INTRAVENOUS ONCE
OUTPATIENT
Start: 2025-10-22

## 2025-07-02 RX ORDER — DIPHENHYDRAMINE HCL 25 MG
25 TABLET ORAL ONCE
Start: 2025-10-22 | End: 2025-10-22

## 2025-07-02 RX ORDER — ACETAMINOPHEN 325 MG/1
650 TABLET ORAL ONCE
Start: 2025-10-22 | End: 2025-10-22

## 2025-07-02 RX ORDER — METHYLPREDNISOLONE SODIUM SUCCINATE 40 MG/ML
10 INJECTION, POWDER, LYOPHILIZED, FOR SOLUTION INTRAMUSCULAR; INTRAVENOUS ONCE
Start: 2025-10-22 | End: 2025-10-22

## 2025-07-02 RX ORDER — SODIUM CHLORIDE 9 MG/ML
20 INJECTION, SOLUTION INTRAVENOUS ONCE
Status: COMPLETED | OUTPATIENT
Start: 2025-07-02 | End: 2025-07-02

## 2025-07-02 RX ORDER — ACETAMINOPHEN 325 MG/1
650 TABLET ORAL ONCE
Status: COMPLETED | OUTPATIENT
Start: 2025-07-02 | End: 2025-07-02

## 2025-07-02 RX ADMIN — ACETAMINOPHEN 650 MG: 325 TABLET ORAL at 07:52

## 2025-07-02 RX ADMIN — SODIUM CHLORIDE 20 ML/HR: 9 INJECTION, SOLUTION INTRAVENOUS at 07:51

## 2025-07-02 RX ADMIN — DIPHENHYDRAMINE HYDROCHLORIDE 25 MG: 25 TABLET ORAL at 07:52

## 2025-07-02 RX ADMIN — FAMOTIDINE 20 MG: 10 INJECTION INTRAVENOUS at 07:55

## 2025-07-02 RX ADMIN — METHYLPREDNISOLONE SODIUM SUCCINATE 10 MG: 40 INJECTION, POWDER, FOR SOLUTION INTRAMUSCULAR; INTRAVENOUS at 07:53

## 2025-07-02 RX ADMIN — IRON SUCROSE 200 MG: 20 INJECTION, SOLUTION INTRAVENOUS at 08:17

## 2025-07-02 NOTE — PROGRESS NOTES
Pt to Infusion for Venofer. Offers no complaints. Tolerated tx without issue. PIV removed. Pt confirms next appointment on 10/22 @0730 AN. CALIN declined.

## 2025-07-08 NOTE — PROGRESS NOTES
Assessment/Plan:      Diagnoses and all orders for this visit:    Acute pain of left shoulder  -     XR shoulder 2+ vw left; Future  -     Ambulatory referral to Orthopedic Surgery; Future      Patient reports left shoulder pain for the past week that radiates to the armpit and up the neck  Patient reports occasional numbness and tingling in her fingers of her left hand  Patient reports pain with ROM of the left shoulder  No swelling noted  X-ray of the shoulder ordered  Will follow-up results with the patient  Patient refuses physical therapy or pain medication at this time  Patient referred to ortho for further evaluation and treatment  Subjective:     Patient ID: Quincy Larsen is a 36 y o  female  Patient reports left shoulder pain for the past week  Denies any specific injury  Patient reports that she does a lot of lifting at work  Denies any redness or swelling  Patient describes the pain as a burning pain  Patient reports that it radiates to the armpit and the neck  Patient reports occasional numbness and tingling in her fingers  Patient reports that she took ibuprofen OTC for pain  Patient reports that the pain is not going away  Review of Systems   Constitutional: Negative for chills and fever  HENT: Negative for congestion, ear pain and sore throat  Respiratory: Negative for cough, chest tightness and shortness of breath  Cardiovascular: Negative for chest pain  Gastrointestinal: Negative for abdominal pain, diarrhea and vomiting  Musculoskeletal:        As noted in HPI  Skin: Negative for rash  Neurological: Negative for dizziness, syncope, light-headedness and headaches  Objective:     Physical Exam  Vitals reviewed  Constitutional:       General: She is not in acute distress  Appearance: She is not ill-appearing or diaphoretic     HENT:      Right Ear: External ear normal       Left Ear: External ear normal    Cardiovascular:      Rate and Rhythm: Encouraged physical fitness and daily physical activity daily.     Normal rate and regular rhythm  Pulses: Normal pulses  Heart sounds: Normal heart sounds  Pulmonary:      Effort: Pulmonary effort is normal  No respiratory distress  Breath sounds: Normal breath sounds  No wheezing  Musculoskeletal:      Comments: Gait wnl  No tenderness noted on palpation of the right shoulder region  No swelling or skin changes noted  Patient reports pain with ROM of the left shoulder  Neurological:      Mental Status: She is alert and oriented to person, place, and time     Psychiatric:         Mood and Affect: Mood normal

## 2025-07-24 ENCOUNTER — NURSE TRIAGE (OUTPATIENT)
Age: 35
End: 2025-07-24

## 2025-07-24 NOTE — TELEPHONE ENCOUNTER
"REASON FOR CONVERSATION: Vaginal Problem    SYMPTOMS: vaginal lump     OTHER HEALTH INFORMATION: Pain and discomfort started last week. Noticed a few days ago some BRB drainage. Bump found today.     PROTOCOL DISPOSITION: See Today in Office    CARE ADVICE PROVIDED: warm compresses/soaks; tylenol motrin; call back measures    PRACTICE FOLLOW-UP: na      Reason for Disposition   Swelling is painful to touch and no fever    Answer Assessment - Initial Assessment Questions  1. APPEARANCE of SWELLING: \"What does it look like?\"      STEW  2. SIZE: \"How large is the swelling?\" (e.g., inches, cm; or compare to size of pinhead, tip of pen, eraser, coin, pea, grape, ping pong ball)       Size of nickel  3. LOCATION: \"Where is the swelling located?\"      Vaginal opening, left  4. ONSET: \"When did the swelling start?\"      today  5. COLOR: \"What color is it?\" \"Is there more than one color?\"      STEW  6. PAIN: \"Is there any pain?\" If Yes, ask: \"How bad is the pain?\" (Scale 1-10; or mild, moderate, severe)        4/10  7. ITCH: \"Does it itch?\" If Yes, ask: \"How bad is the itch?\"       denies  8. CAUSE: \"What do you think caused the swelling?\"      unknown  9 OTHER SYMPTOMS: \"Do you have any other symptoms?\" (e.g., fever)      denies    Protocols used: Skin Lump or Localized Swelling-Adult-[place]    "

## 2025-07-25 ENCOUNTER — OFFICE VISIT (OUTPATIENT)
Dept: OBGYN CLINIC | Facility: CLINIC | Age: 35
End: 2025-07-25
Payer: COMMERCIAL

## 2025-07-25 VITALS
HEIGHT: 66 IN | WEIGHT: 261 LBS | SYSTOLIC BLOOD PRESSURE: 114 MMHG | DIASTOLIC BLOOD PRESSURE: 80 MMHG | BODY MASS INDEX: 41.95 KG/M2

## 2025-07-25 DIAGNOSIS — R22.9 PERINEAL LUMP: Primary | ICD-10-CM

## 2025-07-25 PROCEDURE — 99213 OFFICE O/P EST LOW 20 MIN: CPT | Performed by: OBSTETRICS & GYNECOLOGY

## 2025-07-25 NOTE — PROGRESS NOTES
"Assessment/Plan    Diagnoses and all orders for this visit:    Perineal lump    Reviewed with patient that she has an epidermal inclusion cyst. It is much smaller today and most of it seems to have drained. It is still draining some clear fluid. Reviewed options for expectant management vs I&D. Pt opts for expectant management. Reviewed sitz baths and massaging area with warm compress. Reviewed signs of infection. Pt to return if area increases in size, becomes more painful or any signs of infection arise. All questions answered.          Subjective  Chief Complaint   Patient presents with    Gynecology Problem     Vaginal lump  Noticed some discomft last week  Felt today  Some blood on wiping         Nadine Lacey is a 35 y.o.  female here for a problem visit. She noticed some discomfort last week and then yesterday felt a big lump in her left perineum. She noticed a bit of blood from the area when wiping but denies any drainage or puss. She denies any fever or chills. Her pain has improved today but she isn't sure what the lump is.    Problem List[1]      Gynecologic History  Patient's last menstrual period was 2025 (approximate).    Obstetric History  OB History    Para Term  AB Living   0 0 0 0 0 0   SAB IAB Ectopic Multiple Live Births   0 0 0 0 0   Obstetric Comments   Menarche 14        Allergies  Other and Strawberry c [ascorbate - food allergy]    Medications  Current Medications[2]      Review of Systems  Review of Systems       Objective     /80 (BP Location: Right arm, Patient Position: Sitting, Cuff Size: Large)   Ht 5' 6\" (1.676 m)   Wt 118 kg (261 lb)   LMP 2025 (Approximate)   BMI 42.13 kg/m²       Physical Exam  Constitutional:       Appearance: Normal appearance.   Genitourinary:      Genitourinary Comments: Raised small cystic lesion with clear fluid actively draining, no erythema or bleeding         HENT:      Head: Normocephalic and atraumatic. "     Eyes:      Extraocular Movements: Extraocular movements intact.     Pulmonary:      Effort: Pulmonary effort is normal.     Musculoskeletal:         General: Normal range of motion.     Neurological:      Mental Status: She is alert. Mental status is at baseline.     Psychiatric:         Mood and Affect: Mood normal.         Behavior: Behavior normal.                    [1]   Patient Active Problem List  Diagnosis    Other malaise and fatigue    IBS (irritable bowel syndrome)    Hypothyroidism    Anxiety    Atypical mole    Acute pain of left shoulder    Low ferritin    Iron deficiency anemia, unspecified    H/O total cystectomy    RLS (restless legs syndrome)    Folate deficiency    Morbid obesity with BMI of 40.0-44.9, adult ([facility])   [2]   Current Outpatient Medications:     albuterol (Proventil HFA) 90 mcg/act inhaler, Inhale 2 puffs every 6 (six) hours as needed for wheezing, Disp: 6.7 g, Rfl: 2    clobetasol (TEMOVATE) 0.05 % ointment, Apply topically 2 (two) times a day To affected areas of the hands only., Disp: 60 g, Rfl: 3    ferrous sulfate 325 (65 Fe) mg tablet, Take by mouth daily with breakfast, Disp: , Rfl:     fluticasone (FLONASE) 50 mcg/act nasal spray, 1 spray into each nostril daily, Disp: 11.1 mL, Rfl: 0    folic acid (FOLVITE) 400 mcg tablet, TAKE 1 TABLET BY MOUTH DAILY., Disp: 30 tablet, Rfl: 2    naproxen (Naprosyn) 500 mg tablet, Take 1 tablet (500 mg total) by mouth 2 (two) times a day with meals, Disp: 28 tablet, Rfl: 0    SUMAtriptan (IMITREX) 25 mg tablet, Take 1 tab PO and may repeat dose x1 after at least 2hrs, Disp: 30 tablet, Rfl: 1    Synthroid 125 MCG tablet, , Disp: , Rfl:     tacrolimus (PROTOPIC) 0.1 % ointment, Apply topically 2 (two) times a day To the face., Disp: 60 g, Rfl: 3

## (undated) DEVICE — CHLORAPREP HI-LITE 26ML ORANGE

## (undated) DEVICE — TROCAR: Brand: KII FIOS FIRST ENTRY

## (undated) DEVICE — TROCAR: Brand: KII® SLEEVE

## (undated) DEVICE — CHLORHEXIDINE 4PCT 4 OZ

## (undated) DEVICE — ENSEAL LAPAROSCOPIC TISSUE SEALER G2 ARTICULATING  CURVED JAW FOR USE WITH G2 GENERATOR 5MM DIAMETER 35CM SHAFT LENGTH: Brand: ENSEAL

## (undated) DEVICE — SPONGE 4 X 4 XRAY 16 PLY STRL LF RFD

## (undated) DEVICE — TRAY FOLEY 16FR URIMETER SURESTEP

## (undated) DEVICE — DECANTER: Brand: UNBRANDED

## (undated) DEVICE — APPLICATOR ENDO SURGICEL

## (undated) DEVICE — 3M™ STERI-STRIP™ REINFORCED ADHESIVE SKIN CLOSURES, R1547, 1/2 IN X 4 IN (12 MM X 100 MM), 6 STRIPS/ENVELOPE: Brand: 3M™ STERI-STRIP™

## (undated) DEVICE — SYRINGE 10ML LL

## (undated) DEVICE — MAXI PAD5.51 X 13.78 IN. (14.0 X 35.0 CM)HEAVYCONTOUREDUNSCENTED: Brand: CURITY

## (undated) DEVICE — TISSUE REMOVAL SYSTEM FLUID MANAGEMENT ACCESSORIES: Brand: SYMPHION

## (undated) DEVICE — HEAVY DUTY TABLE COVER: Brand: CONVERTORS

## (undated) DEVICE — GLOVE INDICATOR PI UNDERGLOVE SZ 7.5 BLUE

## (undated) DEVICE — PVC URETHRAL CATHETER: Brand: DOVER

## (undated) DEVICE — SPONGE LAP 18 X 18 IN STRL RFD

## (undated) DEVICE — BETHLEHEM UNIVERSAL GYN LAP PK: Brand: CARDINAL HEALTH

## (undated) DEVICE — EXOFIN PRECISION PEN HIGH VISCOSITY TOPICAL SKIN ADHESIVE: Brand: EXOFIN PRECISION PEN, 1G

## (undated) DEVICE — ADHESIVE SKIN CLOSURE SYS EXOFIN FUSION 22CM

## (undated) DEVICE — MAYO STAND COVER: Brand: CONVERTORS

## (undated) DEVICE — PREMIUM DRY TRAY LF: Brand: MEDLINE INDUSTRIES, INC.

## (undated) DEVICE — HEMOSTAT POWDER ADSORB SURGICEL 3GM

## (undated) DEVICE — GLOVE PI ULTRA TOUCH SZ.7.0

## (undated) DEVICE — TUBING INSUFFLATION SET ISO CONNECTOR

## (undated) DEVICE — SUT MONOCRYL 4-0 PS-2 27 IN Y426H

## (undated) DEVICE — TOWEL SET X-RAY

## (undated) DEVICE — INTENDED FOR TISSUE SEPARATION, AND OTHER PROCEDURES THAT REQUIRE A SHARP SURGICAL BLADE TO PUNCTURE OR CUT.: Brand: BARD-PARKER SAFETY BLADES SIZE 11, STERILE

## (undated) DEVICE — BETHLEHEM UNIVERSAL MINOR VAG: Brand: CARDINAL HEALTH

## (undated) DEVICE — STERILE LUBRICATING JELLY, TUBE: Brand: HR LUBRICATING JELLY